# Patient Record
Sex: MALE | Race: WHITE | HISPANIC OR LATINO | Employment: UNEMPLOYED | URBAN - METROPOLITAN AREA
[De-identification: names, ages, dates, MRNs, and addresses within clinical notes are randomized per-mention and may not be internally consistent; named-entity substitution may affect disease eponyms.]

---

## 2019-12-10 ENCOUNTER — TELEPHONE (OUTPATIENT)
Dept: ENDOCRINOLOGY | Facility: CLINIC | Age: 39
End: 2019-12-10

## 2019-12-17 NOTE — TELEPHONE ENCOUNTER
Left vm message stating we got his referral from Dr Rodrigues and was calling to schedule a visit. Unfortunately Dr Gay does not have any openings anytime soon but will be happy to schedule with one of the other providers as soon as possible. Provided call back number.

## 2020-08-12 NOTE — TELEPHONE ENCOUNTER
Left vm stating we got his referral from Dr. Rodrigues on 12/2019 and was calling to schedule a visit.  Provided call back number again.  Scanned psychologist (Jatinder Rodrigues) notes and 2018 / 2019 labs in .

## 2021-05-04 ENCOUNTER — OFFICE VISIT (OUTPATIENT)
Dept: OTOLARYNGOLOGY | Facility: CLINIC | Age: 41
End: 2021-05-04
Payer: MEDICARE

## 2021-05-04 VITALS — SYSTOLIC BLOOD PRESSURE: 143 MMHG | HEART RATE: 78 BPM | DIASTOLIC BLOOD PRESSURE: 95 MMHG

## 2021-05-04 DIAGNOSIS — J03.91 RECURRENT TONSILLITIS: ICD-10-CM

## 2021-05-04 DIAGNOSIS — J35.1 ENLARGED TONSILS: Primary | ICD-10-CM

## 2021-05-04 DIAGNOSIS — G47.30 SLEEP-DISORDERED BREATHING: ICD-10-CM

## 2021-05-04 PROCEDURE — 1126F PR PAIN SEVERITY QUANTIFIED, NO PAIN PRESENT: ICD-10-PCS | Mod: S$GLB,,, | Performed by: NURSE PRACTITIONER

## 2021-05-04 PROCEDURE — 99999 PR PBB SHADOW E&M-EST. PATIENT-LVL III: ICD-10-PCS | Mod: PBBFAC,,, | Performed by: NURSE PRACTITIONER

## 2021-05-04 PROCEDURE — 1126F AMNT PAIN NOTED NONE PRSNT: CPT | Mod: S$GLB,,, | Performed by: NURSE PRACTITIONER

## 2021-05-04 PROCEDURE — 99999 PR PBB SHADOW E&M-EST. PATIENT-LVL III: CPT | Mod: PBBFAC,,, | Performed by: NURSE PRACTITIONER

## 2021-05-04 PROCEDURE — 99204 OFFICE O/P NEW MOD 45 MIN: CPT | Mod: S$GLB,,, | Performed by: NURSE PRACTITIONER

## 2021-05-04 PROCEDURE — 99204 PR OFFICE/OUTPT VISIT, NEW, LEVL IV, 45-59 MIN: ICD-10-PCS | Mod: S$GLB,,, | Performed by: NURSE PRACTITIONER

## 2021-05-04 RX ORDER — QUETIAPINE FUMARATE 300 MG/1
300 TABLET, FILM COATED ORAL NIGHTLY
COMMUNITY
Start: 2021-03-17

## 2021-05-04 RX ORDER — LAMOTRIGINE 200 MG/1
200 TABLET ORAL DAILY
COMMUNITY
Start: 2021-03-17

## 2021-05-04 RX ORDER — FENOFIBRATE 160 MG/1
TABLET ORAL
COMMUNITY
Start: 2021-02-14

## 2021-05-04 RX ORDER — RISPERIDONE 3 MG/1
3 TABLET ORAL NIGHTLY
COMMUNITY
Start: 2021-03-17

## 2021-05-04 RX ORDER — VORTIOXETINE 20 MG/1
TABLET, FILM COATED ORAL
COMMUNITY
Start: 2021-04-13

## 2021-05-04 RX ORDER — LEVOTHYROXINE SODIUM 75 UG/1
TABLET ORAL
COMMUNITY
Start: 2021-02-14

## 2021-05-04 RX ORDER — CLONAZEPAM 2 MG/1
TABLET ORAL
COMMUNITY
Start: 2021-04-21

## 2021-05-04 RX ORDER — METOPROLOL SUCCINATE 50 MG/1
TABLET, EXTENDED RELEASE ORAL
COMMUNITY
Start: 2021-04-22

## 2021-05-17 ENCOUNTER — OFFICE VISIT (OUTPATIENT)
Dept: SLEEP MEDICINE | Facility: CLINIC | Age: 41
End: 2021-05-17
Payer: MEDICARE

## 2021-05-17 VITALS
DIASTOLIC BLOOD PRESSURE: 94 MMHG | HEIGHT: 71 IN | BODY MASS INDEX: 32.06 KG/M2 | HEART RATE: 73 BPM | WEIGHT: 229 LBS | SYSTOLIC BLOOD PRESSURE: 134 MMHG

## 2021-05-17 DIAGNOSIS — F51.09 OTHER INSOMNIA NOT DUE TO A SUBSTANCE OR KNOWN PHYSIOLOGICAL CONDITION: ICD-10-CM

## 2021-05-17 DIAGNOSIS — G47.10 HYPERSOMNOLENCE: Primary | ICD-10-CM

## 2021-05-17 DIAGNOSIS — J35.1 TONSILLAR HYPERTROPHY: ICD-10-CM

## 2021-05-17 DIAGNOSIS — G47.30 SLEEP-DISORDERED BREATHING: ICD-10-CM

## 2021-05-17 PROCEDURE — 99999 PR PBB SHADOW E&M-EST. PATIENT-LVL IV: CPT | Mod: PBBFAC,,, | Performed by: INTERNAL MEDICINE

## 2021-05-17 PROCEDURE — 99999 PR PBB SHADOW E&M-EST. PATIENT-LVL IV: ICD-10-PCS | Mod: PBBFAC,,, | Performed by: INTERNAL MEDICINE

## 2021-05-17 PROCEDURE — 3008F BODY MASS INDEX DOCD: CPT | Mod: CPTII,S$GLB,, | Performed by: INTERNAL MEDICINE

## 2021-05-17 PROCEDURE — 3008F PR BODY MASS INDEX (BMI) DOCUMENTED: ICD-10-PCS | Mod: CPTII,S$GLB,, | Performed by: INTERNAL MEDICINE

## 2021-05-17 PROCEDURE — 99204 OFFICE O/P NEW MOD 45 MIN: CPT | Mod: S$GLB,,, | Performed by: INTERNAL MEDICINE

## 2021-05-17 PROCEDURE — 1126F AMNT PAIN NOTED NONE PRSNT: CPT | Mod: S$GLB,,, | Performed by: INTERNAL MEDICINE

## 2021-05-17 PROCEDURE — 99204 PR OFFICE/OUTPT VISIT, NEW, LEVL IV, 45-59 MIN: ICD-10-PCS | Mod: S$GLB,,, | Performed by: INTERNAL MEDICINE

## 2021-05-17 PROCEDURE — 1126F PR PAIN SEVERITY QUANTIFIED, NO PAIN PRESENT: ICD-10-PCS | Mod: S$GLB,,, | Performed by: INTERNAL MEDICINE

## 2021-05-18 ENCOUNTER — TELEPHONE (OUTPATIENT)
Dept: SLEEP MEDICINE | Facility: OTHER | Age: 41
End: 2021-05-18

## 2021-05-20 ENCOUNTER — TELEPHONE (OUTPATIENT)
Dept: SLEEP MEDICINE | Facility: OTHER | Age: 41
End: 2021-05-20

## 2021-05-21 ENCOUNTER — HOSPITAL ENCOUNTER (OUTPATIENT)
Dept: SLEEP MEDICINE | Facility: OTHER | Age: 41
Discharge: HOME | End: 2021-05-21
Attending: INTERNAL MEDICINE
Payer: MEDICARE

## 2021-05-21 DIAGNOSIS — G47.10 HYPERSOMNOLENCE: ICD-10-CM

## 2021-05-21 DIAGNOSIS — J35.1 TONSILLAR HYPERTROPHY: ICD-10-CM

## 2021-05-21 DIAGNOSIS — F51.09 OTHER INSOMNIA NOT DUE TO A SUBSTANCE OR KNOWN PHYSIOLOGICAL CONDITION: ICD-10-CM

## 2021-05-21 PROCEDURE — 95800 SLP STDY UNATTENDED: CPT

## 2025-04-24 ENCOUNTER — HOSPITAL ENCOUNTER (EMERGENCY)
Facility: HOSPITAL | Age: 45
Discharge: HOME | End: 2025-04-24
Attending: EMERGENCY MEDICINE
Payer: MEDICARE

## 2025-04-24 VITALS
RESPIRATION RATE: 18 BRPM | DIASTOLIC BLOOD PRESSURE: 85 MMHG | HEART RATE: 71 BPM | HEIGHT: 71 IN | WEIGHT: 206 LBS | TEMPERATURE: 98 F | OXYGEN SATURATION: 98 % | SYSTOLIC BLOOD PRESSURE: 140 MMHG | BODY MASS INDEX: 28.84 KG/M2

## 2025-04-24 DIAGNOSIS — R41.82 ALTERED MENTAL STATUS, UNSPECIFIED ALTERED MENTAL STATUS TYPE: Primary | ICD-10-CM

## 2025-04-24 DIAGNOSIS — F99 PSYCHIATRIC DISORDER: ICD-10-CM

## 2025-04-24 DIAGNOSIS — R41.0 CONFUSION: ICD-10-CM

## 2025-04-24 LAB
ABSOLUTE EOSINOPHIL (OHS): 0.14 K/UL
ABSOLUTE MONOCYTE (OHS): 0.29 K/UL (ref 0.3–1)
ABSOLUTE NEUTROPHIL COUNT (OHS): 2 K/UL (ref 1.8–7.7)
ALBUMIN SERPL BCP-MCNC: 4.3 G/DL (ref 3.5–5.2)
ALP SERPL-CCNC: 41 UNIT/L (ref 40–150)
ALT SERPL W/O P-5'-P-CCNC: 18 UNIT/L (ref 10–44)
AMPHET UR QL SCN: NEGATIVE
ANION GAP (OHS): 7 MMOL/L (ref 8–16)
AST SERPL-CCNC: 16 UNIT/L (ref 11–45)
BARBITURATE SCN PRESENT UR: NEGATIVE
BASOPHILS # BLD AUTO: 0.02 K/UL
BASOPHILS NFR BLD AUTO: 0.5 %
BENZODIAZ UR QL SCN: ABNORMAL
BILIRUB SERPL-MCNC: 0.6 MG/DL (ref 0.1–1)
BILIRUB UR QL STRIP.AUTO: NEGATIVE
BUN SERPL-MCNC: 18 MG/DL (ref 6–20)
CALCIUM SERPL-MCNC: 9.2 MG/DL (ref 8.7–10.5)
CANNABINOIDS UR QL SCN: ABNORMAL
CHLORIDE SERPL-SCNC: 107 MMOL/L (ref 95–110)
CLARITY UR: CLEAR
CO2 SERPL-SCNC: 25 MMOL/L (ref 23–29)
COCAINE UR QL SCN: NEGATIVE
COLOR UR AUTO: YELLOW
CREAT SERPL-MCNC: 1.2 MG/DL (ref 0.5–1.4)
CREAT UR-MCNC: 341 MG/DL (ref 23–375)
ERYTHROCYTE [DISTWIDTH] IN BLOOD BY AUTOMATED COUNT: 11.9 % (ref 11.5–14.5)
ETHANOL SERPL-MCNC: <10 MG/DL
GFR SERPLBLD CREATININE-BSD FMLA CKD-EPI: >60 ML/MIN/1.73/M2
GLUCOSE SERPL-MCNC: 93 MG/DL (ref 70–110)
GLUCOSE UR QL STRIP: NEGATIVE
HCT VFR BLD AUTO: 39.1 % (ref 40–54)
HGB BLD-MCNC: 12.8 GM/DL (ref 14–18)
HGB UR QL STRIP: NEGATIVE
HOLD SPECIMEN: NORMAL
HOLD SPECIMEN: NORMAL
IMM GRANULOCYTES # BLD AUTO: 0.02 K/UL (ref 0–0.04)
IMM GRANULOCYTES NFR BLD AUTO: 0.5 % (ref 0–0.5)
KETONES UR QL STRIP: NEGATIVE
LEUKOCYTE ESTERASE UR QL STRIP: NEGATIVE
LYMPHOCYTES # BLD AUTO: 1.32 K/UL (ref 1–4.8)
MCH RBC QN AUTO: 29.4 PG (ref 27–31)
MCHC RBC AUTO-ENTMCNC: 32.7 G/DL (ref 32–36)
MCV RBC AUTO: 90 FL (ref 82–98)
METHADONE UR QL SCN: NEGATIVE
NITRITE UR QL STRIP: NEGATIVE
NUCLEATED RBC (/100WBC) (OHS): 0 /100 WBC
OHS QRS DURATION: 106 MS
OHS QTC CALCULATION: 401 MS
OPIATES UR QL SCN: NEGATIVE
PCP UR QL: NEGATIVE
PH UR STRIP: 7 [PH]
PLATELET # BLD AUTO: 239 K/UL (ref 150–450)
PMV BLD AUTO: 10.2 FL (ref 9.2–12.9)
POTASSIUM SERPL-SCNC: 3.8 MMOL/L (ref 3.5–5.1)
PROT SERPL-MCNC: 7.2 GM/DL (ref 6–8.4)
PROT UR QL STRIP: ABNORMAL
RBC # BLD AUTO: 4.35 M/UL (ref 4.6–6.2)
RELATIVE EOSINOPHIL (OHS): 3.7 %
RELATIVE LYMPHOCYTE (OHS): 34.8 % (ref 18–48)
RELATIVE MONOCYTE (OHS): 7.7 % (ref 4–15)
RELATIVE NEUTROPHIL (OHS): 52.8 % (ref 38–73)
SODIUM SERPL-SCNC: 139 MMOL/L (ref 136–145)
SP GR UR STRIP: 1.02
TSH SERPL-ACNC: 1.27 UIU/ML (ref 0.4–4)
UROBILINOGEN UR STRIP-ACNC: NEGATIVE EU/DL
WBC # BLD AUTO: 3.79 K/UL (ref 3.9–12.7)

## 2025-04-24 PROCEDURE — 80175 DRUG SCREEN QUAN LAMOTRIGINE: CPT | Performed by: NURSE PRACTITIONER

## 2025-04-24 PROCEDURE — 82565 ASSAY OF CREATININE: CPT | Performed by: NURSE PRACTITIONER

## 2025-04-24 PROCEDURE — 82077 ASSAY SPEC XCP UR&BREATH IA: CPT | Performed by: EMERGENCY MEDICINE

## 2025-04-24 PROCEDURE — 85025 COMPLETE CBC W/AUTO DIFF WBC: CPT | Performed by: NURSE PRACTITIONER

## 2025-04-24 PROCEDURE — 80307 DRUG TEST PRSMV CHEM ANLYZR: CPT | Performed by: NURSE PRACTITIONER

## 2025-04-24 PROCEDURE — 99285 EMERGENCY DEPT VISIT HI MDM: CPT | Mod: 25

## 2025-04-24 PROCEDURE — 84443 ASSAY THYROID STIM HORMONE: CPT | Performed by: NURSE PRACTITIONER

## 2025-04-24 PROCEDURE — 81003 URINALYSIS AUTO W/O SCOPE: CPT | Performed by: NURSE PRACTITIONER

## 2025-04-24 NOTE — FIRST PROVIDER EVALUATION
" Emergency Department TeleTriage Encounter Note      CHIEF COMPLAINT    Chief Complaint   Patient presents with    Altered Mental Status     Pt reports increased confusion x2 week. Mother reports pt unable to complete thoughts and is not acting himself. +dizziness. Pt answering all questions appropriately in triage.       VITAL SIGNS   Initial Vitals [04/24/25 1036]   BP Pulse Resp Temp SpO2   130/70 77 20 97.6 °F (36.4 °C) 97 %      MAP       --            ALLERGIES    Review of patient's allergies indicates:  No Known Allergies    PROVIDER TRIAGE NOTE  Verbal consent for the teletriage evaluation was given by the patient at the start of the evaluation.  All efforts will be made to maintain patient's privacy during the evaluation.      This is a teletriage evaluation of a 44 y.o. male presenting to the ED with c/o dizziness, "confused" that started 2 weeks ago. Patient has some confusion baseline, but per mother worsened over 2 weeks ago.  Likely mixed up psych meds and has not been taking thyroid meds.  Limited physical exam via telehealth: The patient is awake, alert, answering questions appropriately and is not in respiratory distress.  As the Teletriage provider, I performed an initial assessment and ordered appropriate labs and imaging studies, if any, to facilitate the patient's care once placed in the ED. Once a room is available, care and a full evaluation will be completed by an alternate ED provider.  Any additional orders and the final disposition will be determined by that provider.  All imaging and labs will not be followed-up by the Teletriage Team, including myself.      ORDERS  Labs Reviewed   CBC W/ AUTO DIFFERENTIAL    Narrative:     The following orders were created for panel order CBC auto differential.  Procedure                               Abnormality         Status                     ---------                               -----------         ------                     CBC with " Differential[[id number]]                                                          Please view results for these tests on the individual orders.   COMPREHENSIVE METABOLIC PANEL   URINALYSIS, REFLEX TO URINE CULTURE   DRUG SCREEN PANEL, URINE EMERGENCY   CBC WITH DIFFERENTIAL   POCT GLUCOSE MONITORING CONTINUOUS       ED Orders (720h ago, onward)      Start Ordered     Status Ordering Provider    04/24/25 1048 04/24/25 1048  TSH  STAT         Ordered RAFIA SOTO    04/24/25 1048 04/24/25 1048  Lamotrigine Level  Once         Ordered CHARLESRAFIA LION    04/24/25 1046 04/24/25 1045  POCT glucose  Once         Ordered CHARLESRAFIA LION    04/24/25 1046 04/24/25 1045  Urinalysis, Reflex to Urine Culture Urine, Clean Catch  STAT         Ordered CHARLESRAFIA LION    04/24/25 1046 04/24/25 1045  Drug screen panel, emergency  STAT         Ordered RAFIA SOTO    04/24/25 1045 04/24/25 1045  Saline lock IV  Once         Ordered RAFIA SOTO    04/24/25 1045 04/24/25 1045  Pulse Oximetry Continuous  Continuous         Ordered RAFIA SOTO    04/24/25 1045 04/24/25 1045  Cardiac Monitoring - Adult  Continuous        Comments: Notify Physician If:    Ordered CHARLESRAFIA LION    04/24/25 1045 04/24/25 1045  EKG 12-lead  Once         Ordered RAFIA SOTO    04/24/25 1045 04/24/25 1045  CBC auto differential  STAT         Ordered RAFIA SOTO    04/24/25 1045 04/24/25 1045  Comprehensive metabolic panel  STAT         Ordered RAFIA SOTO    04/24/25 1045 04/24/25 1045  CBC with Differential  PROCEDURE ONCE         Ordered RAFIA SOTO              Virtual Visit Note: The provider triage portion of this emergency department evaluation and documentation was performed via [employer], a HIPAA-compliant telemedicine application, in concert with a tele-presenter in the room. A face to face patient evaluation with one of my colleagues will occur once the patient is placed in an emergency department room.      DISCLAIMER: This note was prepared  with M*Modal voice recognition transcription software. Garbled syntax, mangled pronouns, and other bizarre constructions may be attributed to that software system.

## 2025-04-24 NOTE — ED PROVIDER NOTES
Encounter Date: 4/24/2025       History     Chief Complaint   Patient presents with    Altered Mental Status     Pt reports increased confusion x2 week. Mother reports pt unable to complete thoughts and is not acting himself. +dizziness. Pt answering all questions appropriately in triage.     Patient is a 44-year-old male presenting to the ED with his mother who says the patient has had periods of confusion over the past couple of weeks.  He seems at times unable to complete his thoughts.  He denies EtOH.  He admits to occasionally smoking marijuana.  He is currently on multiple psychiatric medications.      Review of patient's allergies indicates:  No Known Allergies  History reviewed. No pertinent past medical history.  History reviewed. No pertinent surgical history.  No family history on file.  Social History[1]  Review of Systems   Constitutional:  Negative for fever.   Neurological:  Negative for syncope and headaches.   All other systems reviewed and are negative.      Physical Exam     Initial Vitals [04/24/25 1036]   BP Pulse Resp Temp SpO2   130/70 77 20 97.6 °F (36.4 °C) 97 %      MAP       --         Physical Exam    Nursing note and vitals reviewed.  Constitutional: No distress.   HENT:   Head: Atraumatic.   Neck: Neck supple.   Cardiovascular:  Normal rate and regular rhythm.           Pulmonary/Chest: Breath sounds normal.   Musculoskeletal:      Cervical back: Neck supple.     Neurological: He is alert and oriented to person, place, and time.   Skin: Skin is warm and dry.   Psychiatric: He has a normal mood and affect. His behavior is normal. Judgment and thought content normal.         ED Course   Procedures  Labs Reviewed   COMPREHENSIVE METABOLIC PANEL - Abnormal       Result Value    Sodium 139      Potassium 3.8      Chloride 107      CO2 25      Glucose 93      BUN 18      Creatinine 1.2      Calcium 9.2      Protein Total 7.2      Albumin 4.3      Bilirubin Total 0.6      ALP 41      AST 16    "   ALT 18      Anion Gap 7 (*)     eGFR >60     URINALYSIS, REFLEX TO URINE CULTURE - Abnormal    Color, UA Yellow      Appearance, UA Clear      pH, UA 7.0      Spec Grav UA 1.025      Protein, UA Trace (*)     Glucose, UA Negative      Ketones, UA Negative      Bilirubin, UA Negative      Blood, UA Negative      Nitrites, UA Negative      Urobilinogen, UA Negative      Leukocyte Esterase, UA Negative     DRUG SCREEN PANEL, URINE EMERGENCY - Abnormal    Benzodiazepine, Urine Presumptive Positive (*)     Methadone, Urine Negative      Cocaine, Urine Negative      Opiates, Urine Negative      Barbiturates, Urine Negative      Amphetamines, Urine Negative      THC Presumptive Positive (*)     Phencyclidine, Urine Negative      Urine Creatinine 341.0      Narrative:     This screen includes the following classes of drugs at the listed cut-off:     Benzodiazepines:        200 ng/ml   Methadone:              300 ng/ml   Cocaine metabolite:     300 ng/ml   Opiates:                300 ng/ml   Barbiturates:           200 ng/ml   Amphetamines:           1000 ng/ml   Marijuana metabs (THC): 50 ng/ml   Phencyclidine (PCP):    25 ng/ml     This is a screening test. If results do not correlate with clinical presentation, then a confirmatory send out test is advised.    This report is intended for use in clinical monitoring and management of patients. It is not intended for use in employment related drug testing."   CBC WITH DIFFERENTIAL - Abnormal    WBC 3.79 (*)     RBC 4.35 (*)     HGB 12.8 (*)     HCT 39.1 (*)     MCV 90      MCH 29.4      MCHC 32.7      RDW 11.9      Platelet Count 239      MPV 10.2      Nucleated RBC 0      Neut % 52.8      Lymph % 34.8      Mono % 7.7      Eos % 3.7      Basophil % 0.5      Imm Grans % 0.5      Neut # 2.00      Lymph # 1.32      Mono # 0.29 (*)     Eos # 0.14      Baso # 0.02      Imm Grans # 0.02     TSH - Normal    TSH 1.268     ALCOHOL,MEDICAL (ETHANOL) - Normal    Alcohol, Serum <10   "   CBC W/ AUTO DIFFERENTIAL    Narrative:     The following orders were created for panel order CBC auto differential.  Procedure                               Abnormality         Status                     ---------                               -----------         ------                     CBC with Differential[[id number]]         Abnormal            Final result                 Please view results for these tests on the individual orders.   EXTRA TUBES    Narrative:     The following orders were created for panel order EXTRA TUBES.  Procedure                               Abnormality         Status                     ---------                               -----------         ------                     Gold Top Hold[[id number]]                                   Final result                 Please view results for these tests on the individual orders.   GOLD TOP HOLD    Extra Tube Hold for add-ons.     GREY TOP URINE HOLD    Extra Tube Hold for add-ons.     LAMOTRIGINE LEVEL        ECG Results              EKG 12-lead (In process)        Collection Time Result Time QRS Duration [facility] QTC Calculation    03/10/18 07:13:58 04/24/25 13:26:19 106 401                     In process by Interface, Lab In [facility] (04/24/25 13:26:28)                   Narrative:    Test Reason : R42,    Vent. Rate :  74 BPM     Atrial Rate :  74 BPM     P-R Int : 192 ms          QRS Dur : 106 ms      QT Int : 362 ms       P-R-T Axes :  14  97  32 degrees    QTcB Int : 401 ms    Normal sinus rhythm  Rightward axis  Borderline Abnormal ECG  No previous ECGs available    Referred By: AAAREFERRAL SELF           Confirmed By:                                   Imaging Results              CT Head Without Contrast (Final result)  Result time 04/24/25 11:48:22      Final result by Ever Ashraf MD (04/24/25 11:48:22)                   Impression:      No acute intracranial abnormality.      Electronically signed by: Ever Ashraf  MD  Date:    04/24/2025  Time:    11:48               Narrative:    EXAMINATION:  CT HEAD WITHOUT CONTRAST    CLINICAL HISTORY:  Mental status change, unknown cause;    TECHNIQUE:  Low dose axial images were obtained through the head.  Coronal and sagittal reformations were also performed. Contrast was not administered.    FINDINGS:  The brain has a normal appearance.  The ventricular system is within normal limits of size for age and shows no distortion by mass effect.  There is no intra or extra-axial mass or hemorrhage identified.  The visualized extracranial structures are significant for bilateral ethmoid mucous membrane thickening.                                       Medications - No data to display  Medical Decision Making  Emergent evaluation of a 44-year-old male who was on multiple psychiatric medications in his having brief periods of confusion.  Workup done here in the ED is unremarkable.  I have explained to the parents that the periods of confusion may be due to the medications he takes.  I suggested close follow up with the physician that prescribes these.  They may return to the ED as needed.    Amount and/or Complexity of Data Reviewed  Labs: ordered.     Details: CBC is unremarkable.  CMP is unremarkable.  Tox screen is positive for benzodiazepines and THC.  Radiology: ordered.                                      Clinical Impression:  Final diagnoses:  [R41.82] Altered mental status, unspecified altered mental status type (Primary)  [R41.0] Confusion  [F99] Psychiatric disorder          ED Disposition Condition    Discharge Good          ED Prescriptions    None       Follow-up Information       Follow up With Specialties Details Why Contact Info    Dk Diaz MD Internal Medicine Schedule an appointment as soon as possible for a visit   [address] [facility] [address]  [phone]      [facility] Emergency Dept Emergency Medicine  If symptoms worsen [address] [place] [address]  Alejandra Alberts [place] [id number] [phone]                 [1]   Social History  Tobacco Use    Smoking status: Some Days     Types: Cigarettes    Smokeless tobacco: Current    Tobacco comments:     Patient states I vape occasionally    Substance Use Topics    Alcohol use: Not Currently    Drug use: Yes     Types: Marijuana     Comment: Patient states I smoke every night and it is prescribed for anxiety        Juanjo Mrash MD  04/24/25 2999

## 2025-04-28 LAB — W LAMOTRIGINE: 9.5 UG/ML

## 2025-04-30 ENCOUNTER — OFFICE VISIT (OUTPATIENT)
Dept: INTERNAL MEDICINE | Facility: CLINIC | Age: 45
End: 2025-04-30
Payer: MEDICARE

## 2025-04-30 ENCOUNTER — LAB VISIT (OUTPATIENT)
Dept: LAB | Facility: HOSPITAL | Age: 45
End: 2025-04-30
Payer: MEDICARE

## 2025-04-30 VITALS
SYSTOLIC BLOOD PRESSURE: 124 MMHG | WEIGHT: 211.63 LBS | BODY MASS INDEX: 29.63 KG/M2 | HEART RATE: 105 BPM | OXYGEN SATURATION: 98 % | DIASTOLIC BLOOD PRESSURE: 82 MMHG | HEIGHT: 71 IN

## 2025-04-30 DIAGNOSIS — Z13.1 SCREENING FOR DIABETES MELLITUS: ICD-10-CM

## 2025-04-30 DIAGNOSIS — R79.9 ABNORMAL FINDING OF BLOOD CHEMISTRY, UNSPECIFIED: ICD-10-CM

## 2025-04-30 DIAGNOSIS — Z11.4 SCREENING FOR HIV (HUMAN IMMUNODEFICIENCY VIRUS): ICD-10-CM

## 2025-04-30 DIAGNOSIS — E03.9 HYPOTHYROIDISM, UNSPECIFIED TYPE: ICD-10-CM

## 2025-04-30 DIAGNOSIS — Z11.59 ENCOUNTER FOR HEPATITIS C SCREENING TEST FOR LOW RISK PATIENT: ICD-10-CM

## 2025-04-30 DIAGNOSIS — Z76.89 ENCOUNTER TO ESTABLISH CARE: Primary | ICD-10-CM

## 2025-04-30 DIAGNOSIS — E78.5 HYPERLIPIDEMIA, UNSPECIFIED HYPERLIPIDEMIA TYPE: ICD-10-CM

## 2025-04-30 DIAGNOSIS — I10 HYPERTENSION, UNSPECIFIED TYPE: ICD-10-CM

## 2025-04-30 DIAGNOSIS — F25.9 SCHIZOAFFECTIVE DISORDER, UNSPECIFIED TYPE: ICD-10-CM

## 2025-04-30 LAB
CHOLEST SERPL-MCNC: 186 MG/DL (ref 120–199)
CHOLEST/HDLC SERPL: 5.5 {RATIO} (ref 2–5)
EAG (OHS): 97 MG/DL (ref 68–131)
HBA1C MFR BLD: 5 % (ref 4–5.6)
HDLC SERPL-MCNC: 34 MG/DL (ref 40–75)
HDLC SERPL: 18.3 % (ref 20–50)
LDLC SERPL CALC-MCNC: 86.2 MG/DL (ref 63–159)
NONHDLC SERPL-MCNC: 152 MG/DL
TRIGL SERPL-MCNC: 329 MG/DL (ref 30–150)

## 2025-04-30 PROCEDURE — 99203 OFFICE O/P NEW LOW 30 MIN: CPT | Mod: GE,S$GLB,,

## 2025-04-30 PROCEDURE — 3079F DIAST BP 80-89 MM HG: CPT | Mod: CPTII,GC,S$GLB,

## 2025-04-30 PROCEDURE — 4010F ACE/ARB THERAPY RXD/TAKEN: CPT | Mod: CPTII,GC,S$GLB,

## 2025-04-30 PROCEDURE — 86803 HEPATITIS C AB TEST: CPT

## 2025-04-30 PROCEDURE — 99999 PR PBB SHADOW E&M-EST. PATIENT-LVL III: CPT | Mod: PBBFAC,GC,,

## 2025-04-30 PROCEDURE — 36415 COLL VENOUS BLD VENIPUNCTURE: CPT

## 2025-04-30 PROCEDURE — 80061 LIPID PANEL: CPT

## 2025-04-30 PROCEDURE — 1159F MED LIST DOCD IN RCRD: CPT | Mod: CPTII,GC,S$GLB,

## 2025-04-30 PROCEDURE — 87389 HIV-1 AG W/HIV-1&-2 AB AG IA: CPT

## 2025-04-30 PROCEDURE — 83036 HEMOGLOBIN GLYCOSYLATED A1C: CPT

## 2025-04-30 PROCEDURE — 3008F BODY MASS INDEX DOCD: CPT | Mod: CPTII,GC,S$GLB,

## 2025-04-30 PROCEDURE — 3074F SYST BP LT 130 MM HG: CPT | Mod: CPTII,GC,S$GLB,

## 2025-04-30 NOTE — PROGRESS NOTES
[facility]  CLINIC NOTE    Patient Name: Ravi Ibanez  YOB: 1980    PRESENTING HISTORY       History of Present Illness:  Mr. Ravi Ibanez is a 44 y.o. male with a medical history of HTN, HLD, Hypothyroidism and schizoaffective disorder presenting to the clinic to establish care. Pt recently seen in the ED for AMS, workup unremarkable and it was likely due to taking his medication the wrong way. Today he has no symptoms or complains. For schizoaffective disorder he sees a virtual psychiatrist monthly. Pt smokes medical [employer], drinks socially once a month, works at [employer], does not exercise and has a typical [place] diet. Denies headaches, visual changes, SOB, cough, chest pain, palpitation, nausea, vomiting, abdominal pain, diarrhea, constipation, urinary frequency or any weakness.      Review of Systems   Constitutional:  Negative for chills, fever, malaise/fatigue and weight loss.   HENT:  Negative for congestion, sinus pain and sore throat.    Eyes:  Negative for blurred vision and double vision.   Respiratory:  Negative for cough, shortness of breath and wheezing.    Cardiovascular:  Negative for chest pain, palpitations and leg swelling.   Gastrointestinal:  Negative for abdominal pain, constipation, diarrhea, nausea and vomiting.   Genitourinary:  Negative for dysuria and frequency.   Musculoskeletal:  Negative for back pain, joint pain and myalgias.   Skin:  Negative for itching and rash.   Neurological:  Negative for dizziness, weakness and headaches.         PAST HISTORY:   No past medical history on file.    No past surgical history on file.    No family history on file.    Social History     Socioeconomic History    Marital status: Single   Tobacco Use    Smoking status: Former     Types: Cigarettes, Vaping w/o nicotine, Vaping with nicotine    Smokeless tobacco: Current    Tobacco comments:     Patient states I vape occasionally    Substance  "and Sexual Activity    Alcohol use: Not Currently    Drug use: Yes     Types: Marijuana     Comment: Patient states I smoke every night and it is prescribed for anxiety       MEDICATIONS & ALLERGIES:     Current Outpatient Medications on File Prior to Visit   Medication Sig    clonazePAM (KLONOPIN) 2 MG Tab     fenofibrate 160 MG Tab     LAMICTAL 200 mg tablet Take 200 mg by mouth once daily.    levothyroxine (SYNTHROID) 75 MCG tablet     LISINOPRIL ORAL Take by mouth.    metoprolol succinate (TOPROL-XL) 50 MG 24 hr tablet     RISPERDAL 3 mg Tab Take 3 mg by mouth nightly.    SEROQUEL 300 mg Tab Take 300 mg by mouth nightly.    TRINTELLIX 20 mg Tab      No current facility-administered medications on file prior to visit.       Review of patient's allergies indicates:  No Known Allergies    OBJECTIVE:   Vital Signs:  Vitals:    04/30/25 1413   BP: 124/82   Pulse: 105   SpO2: 98%   Weight: 96 kg (211 lb 10.3 oz)   Height: 5' 11" (1.803 m)       No results found for this or any previous visit (from the past 24 hours).      Physical Exam  Constitutional:       General: He is not in acute distress.  HENT:      Head: Normocephalic and atraumatic.   Eyes:      Extraocular Movements: Extraocular movements intact.      Pupils: Pupils are equal, round, and reactive to light.   Cardiovascular:      Rate and Rhythm: Normal rate and regular rhythm.      Heart sounds: No murmur heard.  Pulmonary:      Effort: Pulmonary effort is normal. No respiratory distress.      Breath sounds: No wheezing or rales.   Abdominal:      General: Abdomen is flat. Bowel sounds are normal. There is no distension.      Palpations: Abdomen is soft.      Tenderness: There is no abdominal tenderness.   Musculoskeletal:         General: No swelling or tenderness. Normal range of motion.      Cervical back: Normal range of motion.      Right lower leg: No edema.      Left lower leg: No edema.   Lymphadenopathy:      Cervical: No cervical adenopathy. "   Skin:     General: Skin is warm and dry.      Coloration: Skin is not jaundiced.      Findings: No bruising.   Neurological:      General: No focal deficit present.      Mental Status: He is alert and oriented to person, place, and time.           ASSESSMENT & PLAN:     1. Encounter to establish care    2. Hypertension, unspecified type  Overview:  Controlled   Currently on lisinopril      3. Hyperlipidemia, unspecified hyperlipidemia type  Overview:  Currently on fenofibrate  Ordering lipid panel today    Orders:  -     LIPID PANEL; Future; Expected date: 04/30/2025    4. Screening for HIV (human immunodeficiency virus)  -     HIV 1/2 Ag/Ab (4th Gen); Future; Expected date: 04/30/2025    5. Encounter for hepatitis C screening test for low risk patient  -     HEPATITIS C ANTIBODY; Future; Expected date: 04/30/2025    6. Screening for diabetes mellitus  -     HEMOGLOBIN A1C; Future; Expected date: 04/30/2025    7. Hypothyroidism, unspecified type  Overview:  Stable  Last TSH was 1.268  Currently on levothyroxine      8. Schizoaffective disorder, unspecified type  Overview:  Managed by psych  Stable  Currently on:  Klonopin  Lamictal  Risperdal   Seroquel   Trintellix       9. Abnormal finding of blood chemistry, unspecified  -     HEMOGLOBIN A1C; Future; Expected date: 04/30/2025        Discussed with Dr. Hernandez    RTC 6 months      Alistair Ha MD/MPH  Internal Medicine PGY-2  [facility]  [address] [place] [address]

## 2025-05-01 LAB
HCV AB SERPL QL IA: NORMAL
HIV 1+2 AB+HIV1 P24 AG SERPL QL IA: NORMAL

## 2025-07-09 ENCOUNTER — HOSPITAL ENCOUNTER (INPATIENT)
Facility: HOSPITAL | Age: 45
LOS: 6 days | Discharge: PSYCHIATRIC OR SUBSTANCE USE FACILITY | DRG: 917 | End: 2025-07-15
Attending: STUDENT IN AN ORGANIZED HEALTH CARE EDUCATION/TRAINING PROGRAM | Admitting: INTERNAL MEDICINE
Payer: MEDICARE

## 2025-07-09 DIAGNOSIS — Z91.89 AT RISK FOR LONG QT SYNDROME: ICD-10-CM

## 2025-07-09 DIAGNOSIS — Z97.8 ENDOTRACHEALLY INTUBATED: ICD-10-CM

## 2025-07-09 DIAGNOSIS — R94.31 QT PROLONGATION: ICD-10-CM

## 2025-07-09 DIAGNOSIS — R41.82 AMS (ALTERED MENTAL STATUS): ICD-10-CM

## 2025-07-09 DIAGNOSIS — R06.89 RESPIRATORY DEPRESSION: ICD-10-CM

## 2025-07-09 DIAGNOSIS — Z51.81 THERAPEUTIC DRUG MONITORING: ICD-10-CM

## 2025-07-09 DIAGNOSIS — T43.504A: Primary | ICD-10-CM

## 2025-07-09 PROBLEM — E87.6 HYPOKALEMIA: Status: ACTIVE | Noted: 2025-07-09

## 2025-07-09 PROBLEM — G93.40 ACUTE ENCEPHALOPATHY: Status: ACTIVE | Noted: 2025-07-09

## 2025-07-09 PROBLEM — J96.00 ACUTE RESPIRATORY FAILURE: Status: ACTIVE | Noted: 2025-07-09

## 2025-07-09 PROBLEM — E87.20 LACTIC ACID ACIDOSIS: Status: ACTIVE | Noted: 2025-07-09

## 2025-07-09 LAB
ABSOLUTE EOSINOPHIL (OHS): 0.04 K/UL
ABSOLUTE MONOCYTE (OHS): 0.3 K/UL (ref 0.3–1)
ABSOLUTE NEUTROPHIL COUNT (OHS): 5.31 K/UL (ref 1.8–7.7)
ALBUMIN SERPL BCP-MCNC: 4 G/DL (ref 3.5–5.2)
ALP SERPL-CCNC: 40 UNIT/L (ref 40–150)
ALT SERPL W/O P-5'-P-CCNC: 23 UNIT/L (ref 10–44)
AMPHET UR QL SCN: NEGATIVE
AMPHET UR QL SCN: NEGATIVE
ANION GAP (OHS): 12 MMOL/L (ref 8–16)
APAP SERPL-MCNC: <3 UG/ML (ref 10–20)
AST SERPL-CCNC: 17 UNIT/L (ref 11–45)
BARBITURATE SCN PRESENT UR: NEGATIVE
BARBITURATE SCN PRESENT UR: NEGATIVE
BASOPHILS # BLD AUTO: 0.02 K/UL
BASOPHILS NFR BLD AUTO: 0.3 %
BENZODIAZ UR QL SCN: ABNORMAL
BENZODIAZ UR QL SCN: ABNORMAL
BILIRUB SERPL-MCNC: 0.6 MG/DL (ref 0.1–1)
BILIRUB UR QL STRIP.AUTO: NEGATIVE
BIPAP: 0
BUN SERPL-MCNC: 12 MG/DL (ref 6–20)
BUN SERPL-MCNC: 12 MG/DL (ref 6–30)
CALCIUM SERPL-MCNC: 8.3 MG/DL (ref 8.7–10.5)
CANNABINOIDS UR QL SCN: ABNORMAL
CANNABINOIDS UR QL SCN: ABNORMAL
CHLORIDE SERPL-SCNC: 106 MMOL/L (ref 95–110)
CHLORIDE SERPL-SCNC: 108 MMOL/L (ref 95–110)
CK SERPL-CCNC: 92 U/L (ref 20–200)
CK SERPL-CCNC: 93 U/L (ref 20–200)
CLARITY UR: CLEAR
CO2 SERPL-SCNC: 18 MMOL/L (ref 23–29)
COCAINE UR QL SCN: NEGATIVE
COCAINE UR QL SCN: NEGATIVE
COLOR UR AUTO: YELLOW
CORRECTED TEMPERATURE (PCO2): 39.8 MMHG
CORRECTED TEMPERATURE (PH): 7.32
CORRECTED TEMPERATURE (PO2): 51.5 MMHG
CREAT SERPL-MCNC: 0.8 MG/DL (ref 0.5–1.4)
CREAT SERPL-MCNC: 0.8 MG/DL (ref 0.5–1.4)
CREAT UR-MCNC: 221 MG/DL (ref 23–375)
CREAT UR-MCNC: 223 MG/DL (ref 23–375)
ERYTHROCYTE [DISTWIDTH] IN BLOOD BY AUTOMATED COUNT: 12.1 % (ref 11.5–14.5)
ETHANOL SERPL-MCNC: <10 MG/DL
FIO2: 21 %
GFR SERPLBLD CREATININE-BSD FMLA CKD-EPI: >60 ML/MIN/1.73/M2
GLUCOSE SERPL-MCNC: 156 MG/DL (ref 70–110)
GLUCOSE SERPL-MCNC: 162 MG/DL (ref 70–110)
GLUCOSE UR QL STRIP: ABNORMAL
HCT VFR BLD AUTO: 38.3 % (ref 40–54)
HCT VFR BLD CALC: 39 %PCV (ref 36–54)
HCT VFR BLD CALC: 41.1 % (ref 36–54)
HGB BLD-MCNC: 12.8 GM/DL (ref 14–18)
HGB UR QL STRIP: ABNORMAL
IMM GRANULOCYTES # BLD AUTO: 0.02 K/UL (ref 0–0.04)
IMM GRANULOCYTES NFR BLD AUTO: 0.3 % (ref 0–0.5)
KETONES UR QL STRIP: NEGATIVE
LDH SERPL L TO P-CCNC: 4.2 MMOL/L (ref 0.5–2.2)
LEUKOCYTE ESTERASE UR QL STRIP: NEGATIVE
LYMPHOCYTES # BLD AUTO: 0.93 K/UL (ref 1–4.8)
MAGNESIUM SERPL-MCNC: 1.7 MG/DL (ref 1.6–2.6)
MCH RBC QN AUTO: 29.6 PG (ref 27–31)
MCHC RBC AUTO-ENTMCNC: 33.4 G/DL (ref 32–36)
MCV RBC AUTO: 89 FL (ref 82–98)
METHADONE UR QL SCN: NEGATIVE
METHADONE UR QL SCN: NEGATIVE
MICROSCOPIC COMMENT: NORMAL
NITRITE UR QL STRIP: NEGATIVE
NUCLEATED RBC (/100WBC) (OHS): 0 /100 WBC
OPIATES UR QL SCN: NEGATIVE
OPIATES UR QL SCN: NEGATIVE
PCO2 BLDA: 39.8 MMHG (ref 35–45)
PCP UR QL: NEGATIVE
PCP UR QL: NEGATIVE
PH SMN: 7.32 [PH] (ref 7.35–7.45)
PH UR STRIP: 6 [PH]
PLATELET # BLD AUTO: 208 K/UL (ref 150–450)
PMV BLD AUTO: 10.3 FL (ref 9.2–12.9)
PO2 BLDA: 51.5 MMHG (ref 40–60)
POC BASE DEFICIT: -5 MMOL/L
POC HCO3: 20 MMOL/L (ref 24–28)
POC IONIZED CALCIUM: 1.08 MMOL/L (ref 1.06–1.42)
POC PERFORMED BY: ABNORMAL
POC TCO2 (MEASURED): 19 MMOL/L (ref 23–29)
POC TEMPERATURE: 37 C
POTASSIUM BLD-SCNC: 2.9 MMOL/L (ref 3.5–5.1)
POTASSIUM SERPL-SCNC: 2.9 MMOL/L (ref 3.5–5.1)
PROT SERPL-MCNC: 6.3 GM/DL (ref 6–8.4)
PROT UR QL STRIP: ABNORMAL
RBC # BLD AUTO: 4.33 M/UL (ref 4.6–6.2)
RBC #/AREA URNS AUTO: 3 /HPF (ref 0–4)
RELATIVE EOSINOPHIL (OHS): 0.6 %
RELATIVE LYMPHOCYTE (OHS): 14 % (ref 18–48)
RELATIVE MONOCYTE (OHS): 4.5 % (ref 4–15)
RELATIVE NEUTROPHIL (OHS): 80.3 % (ref 38–73)
SALICYLATES SERPL-MCNC: <5 MG/DL (ref 15–30)
SAMPLE: ABNORMAL
SODIUM BLD-SCNC: 138 MMOL/L (ref 136–145)
SODIUM SERPL-SCNC: 138 MMOL/L (ref 136–145)
SP GR UR STRIP: >=1.03
SPECIMEN SOURCE: ABNORMAL
SQUAMOUS #/AREA URNS AUTO: <1 /HPF
TRIGL SERPL-MCNC: 79 MG/DL (ref 30–150)
TSH SERPL-ACNC: 0.72 UIU/ML (ref 0.4–4)
UROBILINOGEN UR STRIP-ACNC: NEGATIVE EU/DL
WBC # BLD AUTO: 6.62 K/UL (ref 3.9–12.7)
WBC #/AREA URNS AUTO: 1 /HPF (ref 0–5)

## 2025-07-09 PROCEDURE — 80179 DRUG ASSAY SALICYLATE: CPT | Mod: HCNC | Performed by: STUDENT IN AN ORGANIZED HEALTH CARE EDUCATION/TRAINING PROGRAM

## 2025-07-09 PROCEDURE — 20000000 HC ICU ROOM: Mod: HCNC

## 2025-07-09 PROCEDURE — 82803 BLOOD GASES ANY COMBINATION: CPT | Mod: HCNC

## 2025-07-09 PROCEDURE — 83735 ASSAY OF MAGNESIUM: CPT | Mod: HCNC

## 2025-07-09 PROCEDURE — 27100171 HC OXYGEN HIGH FLOW UP TO 24 HOURS: Mod: HCNC

## 2025-07-09 PROCEDURE — 80307 DRUG TEST PRSMV CHEM ANLYZR: CPT | Mod: HCNC | Performed by: EMERGENCY MEDICINE

## 2025-07-09 PROCEDURE — 31500 INSERT EMERGENCY AIRWAY: CPT | Mod: HCNC

## 2025-07-09 PROCEDURE — 80175 DRUG SCREEN QUAN LAMOTRIGINE: CPT | Mod: HCNC | Performed by: EMERGENCY MEDICINE

## 2025-07-09 PROCEDURE — 83874 ASSAY OF MYOGLOBIN: CPT | Mod: HCNC

## 2025-07-09 PROCEDURE — 87040 BLOOD CULTURE FOR BACTERIA: CPT | Mod: HCNC

## 2025-07-09 PROCEDURE — 84478 ASSAY OF TRIGLYCERIDES: CPT | Mod: HCNC | Performed by: STUDENT IN AN ORGANIZED HEALTH CARE EDUCATION/TRAINING PROGRAM

## 2025-07-09 PROCEDURE — 25000003 PHARM REV CODE 250: Mod: HCNC

## 2025-07-09 PROCEDURE — 83605 ASSAY OF LACTIC ACID: CPT | Mod: HCNC

## 2025-07-09 PROCEDURE — 84443 ASSAY THYROID STIM HORMONE: CPT | Mod: HCNC | Performed by: STUDENT IN AN ORGANIZED HEALTH CARE EDUCATION/TRAINING PROGRAM

## 2025-07-09 PROCEDURE — 82550 ASSAY OF CK (CPK): CPT | Mod: HCNC | Performed by: STUDENT IN AN ORGANIZED HEALTH CARE EDUCATION/TRAINING PROGRAM

## 2025-07-09 PROCEDURE — 96365 THER/PROPH/DIAG IV INF INIT: CPT | Mod: HCNC

## 2025-07-09 PROCEDURE — 80307 DRUG TEST PRSMV CHEM ANLYZR: CPT | Mod: HCNC | Performed by: STUDENT IN AN ORGANIZED HEALTH CARE EDUCATION/TRAINING PROGRAM

## 2025-07-09 PROCEDURE — 99285 EMERGENCY DEPT VISIT HI MDM: CPT | Mod: 25,HCNC

## 2025-07-09 PROCEDURE — 99223 1ST HOSP IP/OBS HIGH 75: CPT | Mod: HCNC,,, | Performed by: EMERGENCY MEDICINE

## 2025-07-09 PROCEDURE — 99900035 HC TECH TIME PER 15 MIN (STAT): Mod: HCNC

## 2025-07-09 PROCEDURE — 94761 N-INVAS EAR/PLS OXIMETRY MLT: CPT | Mod: HCNC,XB

## 2025-07-09 PROCEDURE — 82550 ASSAY OF CK (CPK): CPT | Mod: HCNC

## 2025-07-09 PROCEDURE — 80053 COMPREHEN METABOLIC PANEL: CPT | Mod: HCNC

## 2025-07-09 PROCEDURE — 5A1945Z RESPIRATORY VENTILATION, 24-96 CONSECUTIVE HOURS: ICD-10-PCS | Performed by: STUDENT IN AN ORGANIZED HEALTH CARE EDUCATION/TRAINING PROGRAM

## 2025-07-09 PROCEDURE — 94002 VENT MGMT INPAT INIT DAY: CPT | Mod: HCNC

## 2025-07-09 PROCEDURE — 0BH17EZ INSERTION OF ENDOTRACHEAL AIRWAY INTO TRACHEA, VIA NATURAL OR ARTIFICIAL OPENING: ICD-10-PCS | Performed by: STUDENT IN AN ORGANIZED HEALTH CARE EDUCATION/TRAINING PROGRAM

## 2025-07-09 PROCEDURE — 81001 URINALYSIS AUTO W/SCOPE: CPT | Mod: HCNC | Performed by: STUDENT IN AN ORGANIZED HEALTH CARE EDUCATION/TRAINING PROGRAM

## 2025-07-09 PROCEDURE — 63600175 PHARM REV CODE 636 W HCPCS: Mod: HCNC

## 2025-07-09 PROCEDURE — 99900026 HC AIRWAY MAINTENANCE (STAT): Mod: HCNC

## 2025-07-09 PROCEDURE — 82077 ASSAY SPEC XCP UR&BREATH IA: CPT | Mod: HCNC

## 2025-07-09 PROCEDURE — 80143 DRUG ASSAY ACETAMINOPHEN: CPT | Mod: HCNC | Performed by: STUDENT IN AN ORGANIZED HEALTH CARE EDUCATION/TRAINING PROGRAM

## 2025-07-09 PROCEDURE — 85025 COMPLETE CBC W/AUTO DIFF WBC: CPT | Mod: HCNC

## 2025-07-09 RX ORDER — MAGNESIUM SULFATE 1 G/100ML
1 INJECTION INTRAVENOUS ONCE
Status: COMPLETED | OUTPATIENT
Start: 2025-07-09 | End: 2025-07-09

## 2025-07-09 RX ORDER — ENOXAPARIN SODIUM 100 MG/ML
40 INJECTION SUBCUTANEOUS EVERY 24 HOURS
Status: DISCONTINUED | OUTPATIENT
Start: 2025-07-10 | End: 2025-07-15 | Stop reason: HOSPADM

## 2025-07-09 RX ORDER — ROCURONIUM BROMIDE 10 MG/ML
1 INJECTION, SOLUTION INTRAVENOUS ONCE
Status: DISCONTINUED | OUTPATIENT
Start: 2025-07-09 | End: 2025-07-09

## 2025-07-09 RX ORDER — LEVOTHYROXINE SODIUM 75 UG/1
75 TABLET ORAL
Status: DISCONTINUED | OUTPATIENT
Start: 2025-07-10 | End: 2025-07-12

## 2025-07-09 RX ORDER — SODIUM CHLORIDE 0.9 % (FLUSH) 0.9 %
10 SYRINGE (ML) INJECTION
Status: DISCONTINUED | OUTPATIENT
Start: 2025-07-09 | End: 2025-07-15 | Stop reason: HOSPADM

## 2025-07-09 RX ORDER — FENTANYL CITRATE-0.9 % NACL/PF 10 MCG/ML
0-500 PLASTIC BAG, INJECTION (ML) INTRAVENOUS CONTINUOUS
Refills: 0 | Status: DISCONTINUED | OUTPATIENT
Start: 2025-07-09 | End: 2025-07-11

## 2025-07-09 RX ORDER — POTASSIUM CHLORIDE 7.45 MG/ML
10 INJECTION INTRAVENOUS
Status: COMPLETED | OUTPATIENT
Start: 2025-07-09 | End: 2025-07-09

## 2025-07-09 RX ORDER — PROPOFOL 10 MG/ML
0-50 INJECTION, EMULSION INTRAVENOUS CONTINUOUS
Status: DISCONTINUED | OUTPATIENT
Start: 2025-07-09 | End: 2025-07-10

## 2025-07-09 RX ORDER — ROCURONIUM BROMIDE 10 MG/ML
100 INJECTION, SOLUTION INTRAVENOUS
Status: COMPLETED | OUTPATIENT
Start: 2025-07-09 | End: 2025-07-09

## 2025-07-09 RX ORDER — POTASSIUM CHLORIDE 7.45 MG/ML
10 INJECTION INTRAVENOUS
Status: COMPLETED | OUTPATIENT
Start: 2025-07-09 | End: 2025-07-10

## 2025-07-09 RX ORDER — ETOMIDATE 2 MG/ML
28 INJECTION INTRAVENOUS
Status: COMPLETED | OUTPATIENT
Start: 2025-07-09 | End: 2025-07-09

## 2025-07-09 RX ORDER — ETOMIDATE 2 MG/ML
0.3 INJECTION INTRAVENOUS ONCE
Status: DISCONTINUED | OUTPATIENT
Start: 2025-07-09 | End: 2025-07-09

## 2025-07-09 RX ADMIN — POTASSIUM CHLORIDE 10 MEQ: 7.46 INJECTION, SOLUTION INTRAVENOUS at 10:07

## 2025-07-09 RX ADMIN — Medication 50 MCG/HR: at 11:07

## 2025-07-09 RX ADMIN — ROCURONIUM BROMIDE 100 MG: 10 INJECTION, SOLUTION INTRAVENOUS at 09:07

## 2025-07-09 RX ADMIN — PROPOFOL 15 MCG/KG/MIN: 10 INJECTION, EMULSION INTRAVENOUS at 09:07

## 2025-07-09 RX ADMIN — SODIUM CHLORIDE, POTASSIUM CHLORIDE, SODIUM LACTATE AND CALCIUM CHLORIDE 1000 ML: 600; 310; 30; 20 INJECTION, SOLUTION INTRAVENOUS at 09:07

## 2025-07-09 RX ADMIN — MAGNESIUM SULFATE HEPTAHYDRATE 1 G: 500 INJECTION, SOLUTION INTRAMUSCULAR; INTRAVENOUS at 10:07

## 2025-07-09 RX ADMIN — POTASSIUM CHLORIDE 10 MEQ: 7.46 INJECTION, SOLUTION INTRAVENOUS at 09:07

## 2025-07-09 RX ADMIN — ETOMIDATE 28 MG: 2 INJECTION INTRAVENOUS at 09:07

## 2025-07-10 PROBLEM — Z99.11 ON MECHANICALLY ASSISTED VENTILATION: Status: ACTIVE | Noted: 2025-07-09

## 2025-07-10 PROBLEM — T50.902A DRUG OVERDOSE, INTENTIONAL: Status: ACTIVE | Noted: 2025-07-10

## 2025-07-10 PROBLEM — T43.501A OVERDOSE OF ANTIPSYCHOTIC: Status: ACTIVE | Noted: 2025-07-10

## 2025-07-10 PROBLEM — T68.XXXA HYPOTHERMIA: Status: ACTIVE | Noted: 2025-07-10

## 2025-07-10 LAB
ALBUMIN SERPL BCP-MCNC: 3.7 G/DL (ref 3.5–5.2)
ALBUMIN SERPL BCP-MCNC: 3.9 G/DL (ref 3.5–5.2)
ALLENS TEST: ABNORMAL
ALP SERPL-CCNC: 36 UNIT/L (ref 40–150)
ALP SERPL-CCNC: 41 UNIT/L (ref 40–150)
ALT SERPL W/O P-5'-P-CCNC: 21 UNIT/L (ref 10–44)
ALT SERPL W/O P-5'-P-CCNC: 22 UNIT/L (ref 10–44)
ANION GAP (OHS): 7 MMOL/L (ref 8–16)
ANION GAP (OHS): 8 MMOL/L (ref 8–16)
ANION GAP (OHS): 8 MMOL/L (ref 8–16)
ANION GAP (OHS): 9 MMOL/L (ref 8–16)
AST SERPL-CCNC: 15 UNIT/L (ref 11–45)
AST SERPL-CCNC: 17 UNIT/L (ref 11–45)
BILIRUB SERPL-MCNC: 0.7 MG/DL (ref 0.1–1)
BILIRUB SERPL-MCNC: 0.7 MG/DL (ref 0.1–1)
BUN SERPL-MCNC: 11 MG/DL (ref 6–20)
BUN SERPL-MCNC: 11 MG/DL (ref 6–20)
BUN SERPL-MCNC: 12 MG/DL (ref 6–20)
BUN SERPL-MCNC: 19 MG/DL (ref 6–20)
CALCIUM SERPL-MCNC: 7.2 MG/DL (ref 8.7–10.5)
CALCIUM SERPL-MCNC: 8 MG/DL (ref 8.7–10.5)
CALCIUM SERPL-MCNC: 8 MG/DL (ref 8.7–10.5)
CALCIUM SERPL-MCNC: 8.2 MG/DL (ref 8.7–10.5)
CHLORIDE SERPL-SCNC: 106 MMOL/L (ref 95–110)
CHLORIDE SERPL-SCNC: 106 MMOL/L (ref 95–110)
CHLORIDE SERPL-SCNC: 109 MMOL/L (ref 95–110)
CHLORIDE SERPL-SCNC: 109 MMOL/L (ref 95–110)
CO2 SERPL-SCNC: 20 MMOL/L (ref 23–29)
CO2 SERPL-SCNC: 21 MMOL/L (ref 23–29)
CO2 SERPL-SCNC: 22 MMOL/L (ref 23–29)
CO2 SERPL-SCNC: 24 MMOL/L (ref 23–29)
CREAT SERPL-MCNC: 0.8 MG/DL (ref 0.5–1.4)
CREAT SERPL-MCNC: 0.8 MG/DL (ref 0.5–1.4)
CREAT SERPL-MCNC: 1 MG/DL (ref 0.5–1.4)
CREAT SERPL-MCNC: 1.1 MG/DL (ref 0.5–1.4)
CREAT UR-MCNC: 221 MG/DL (ref 23–375)
DELSYS: ABNORMAL
ERYTHROCYTE [DISTWIDTH] IN BLOOD BY AUTOMATED COUNT: 12.4 % (ref 11.5–14.5)
ERYTHROCYTE [SEDIMENTATION RATE] IN BLOOD BY WESTERGREN METHOD: 18 MM/H
FENTANYL UR QL SCN: NEGATIVE
FIO2: 30
GFR SERPLBLD CREATININE-BSD FMLA CKD-EPI: >60 ML/MIN/1.73/M2
GLUCOSE SERPL-MCNC: 108 MG/DL (ref 70–110)
GLUCOSE SERPL-MCNC: 132 MG/DL (ref 70–110)
GLUCOSE SERPL-MCNC: 85 MG/DL (ref 70–110)
GLUCOSE SERPL-MCNC: 98 MG/DL (ref 70–110)
HCO3 UR-SCNC: 21.7 MMOL/L (ref 24–28)
HCT VFR BLD AUTO: 37.3 % (ref 40–54)
HCT VFR BLD CALC: 37 %PCV (ref 36–54)
HGB BLD-MCNC: 12.4 GM/DL (ref 14–18)
HOLD SPECIMEN: NORMAL
LACTATE SERPL-SCNC: 2 MMOL/L (ref 0.5–2.2)
MAGNESIUM SERPL-MCNC: 2 MG/DL (ref 1.6–2.6)
MCH RBC QN AUTO: 29.7 PG (ref 27–31)
MCHC RBC AUTO-ENTMCNC: 33.2 G/DL (ref 32–36)
MCV RBC AUTO: 89 FL (ref 82–98)
MODE: ABNORMAL
MRSA PCR SCRN (OHS): NOT DETECTED
NUCLEATED RBC (/100WBC) (OHS): 0 /100 WBC
OHS QRS DURATION: 108 MS
OHS QRS DURATION: 112 MS
OHS QTC CALCULATION: 513 MS
OHS QTC CALCULATION: 535 MS
PCO2 BLDA: 44.5 MMHG (ref 35–45)
PEEP: 5
PH SMN: 7.3 [PH] (ref 7.35–7.45)
PHOSPHATE SERPL-MCNC: 2.3 MG/DL (ref 2.7–4.5)
PLATELET # BLD AUTO: 228 K/UL (ref 150–450)
PMV BLD AUTO: 10.6 FL (ref 9.2–12.9)
PO2 BLDA: 52 MMHG (ref 40–60)
POC BE: -5 MMOL/L (ref -2–2)
POC IONIZED CALCIUM: 1.2 MMOL/L (ref 1.06–1.42)
POC SATURATED O2: 83 % (ref 95–100)
POC TCO2: 23 MMOL/L (ref 24–29)
POTASSIUM BLD-SCNC: 3.3 MMOL/L (ref 3.5–5.1)
POTASSIUM SERPL-SCNC: 3.4 MMOL/L (ref 3.5–5.1)
POTASSIUM SERPL-SCNC: 3.5 MMOL/L (ref 3.5–5.1)
POTASSIUM SERPL-SCNC: 3.5 MMOL/L (ref 3.5–5.1)
POTASSIUM SERPL-SCNC: 4.5 MMOL/L (ref 3.5–5.1)
PROT SERPL-MCNC: 5.9 GM/DL (ref 6–8.4)
PROT SERPL-MCNC: 6 GM/DL (ref 6–8.4)
RBC # BLD AUTO: 4.18 M/UL (ref 4.6–6.2)
SAMPLE: ABNORMAL
SITE: ABNORMAL
SODIUM BLD-SCNC: 138 MMOL/L (ref 136–145)
SODIUM SERPL-SCNC: 136 MMOL/L (ref 136–145)
SODIUM SERPL-SCNC: 137 MMOL/L (ref 136–145)
SODIUM SERPL-SCNC: 137 MMOL/L (ref 136–145)
SODIUM SERPL-SCNC: 139 MMOL/L (ref 136–145)
VT: 450
WBC # BLD AUTO: 8.54 K/UL (ref 3.9–12.7)

## 2025-07-10 PROCEDURE — 99900035 HC TECH TIME PER 15 MIN (STAT): Mod: HCNC

## 2025-07-10 PROCEDURE — 63600175 PHARM REV CODE 636 W HCPCS: Mod: HCNC | Performed by: INTERNAL MEDICINE

## 2025-07-10 PROCEDURE — 27100171 HC OXYGEN HIGH FLOW UP TO 24 HOURS: Mod: HCNC

## 2025-07-10 PROCEDURE — 63600175 PHARM REV CODE 636 W HCPCS: Mod: HCNC

## 2025-07-10 PROCEDURE — 99291 CRITICAL CARE FIRST HOUR: CPT | Mod: FS,HCNC,, | Performed by: INTERNAL MEDICINE

## 2025-07-10 PROCEDURE — 94761 N-INVAS EAR/PLS OXIMETRY MLT: CPT | Mod: HCNC

## 2025-07-10 PROCEDURE — 80307 DRUG TEST PRSMV CHEM ANLYZR: CPT | Mod: HCNC | Performed by: EMERGENCY MEDICINE

## 2025-07-10 PROCEDURE — 20000000 HC ICU ROOM: Mod: HCNC

## 2025-07-10 PROCEDURE — 25000003 PHARM REV CODE 250: Mod: HCNC

## 2025-07-10 PROCEDURE — 95714 VEEG EA 12-26 HR UNMNTR: CPT | Mod: HCNC

## 2025-07-10 PROCEDURE — 99499 UNLISTED E&M SERVICE: CPT | Mod: HCNC,,,

## 2025-07-10 PROCEDURE — 94003 VENT MGMT INPAT SUBQ DAY: CPT | Mod: HCNC

## 2025-07-10 PROCEDURE — 95700 EEG CONT REC W/VID EEG TECH: CPT | Mod: HCNC

## 2025-07-10 PROCEDURE — 85025 COMPLETE CBC W/AUTO DIFF WBC: CPT | Mod: HCNC

## 2025-07-10 PROCEDURE — 83735 ASSAY OF MAGNESIUM: CPT | Mod: HCNC

## 2025-07-10 PROCEDURE — 99292 CRITICAL CARE ADDL 30 MIN: CPT | Mod: FS,HCNC,, | Performed by: INTERNAL MEDICINE

## 2025-07-10 PROCEDURE — 99231 SBSQ HOSP IP/OBS SF/LOW 25: CPT | Mod: HCNC,,, | Performed by: STUDENT IN AN ORGANIZED HEALTH CARE EDUCATION/TRAINING PROGRAM

## 2025-07-10 PROCEDURE — 25000003 PHARM REV CODE 250: Mod: HCNC | Performed by: GENERAL ACUTE CARE HOSPITAL

## 2025-07-10 PROCEDURE — 87641 MR-STAPH DNA AMP PROBE: CPT | Mod: HCNC

## 2025-07-10 PROCEDURE — 99900026 HC AIRWAY MAINTENANCE (STAT): Mod: HCNC

## 2025-07-10 PROCEDURE — 80048 BASIC METABOLIC PNL TOTAL CA: CPT | Mod: HCNC

## 2025-07-10 PROCEDURE — 83605 ASSAY OF LACTIC ACID: CPT | Mod: HCNC

## 2025-07-10 PROCEDURE — 93010 ELECTROCARDIOGRAM REPORT: CPT | Mod: HCNC,,, | Performed by: INTERNAL MEDICINE

## 2025-07-10 PROCEDURE — 93005 ELECTROCARDIOGRAM TRACING: CPT | Mod: HCNC | Performed by: INTERNAL MEDICINE

## 2025-07-10 PROCEDURE — 84100 ASSAY OF PHOSPHORUS: CPT | Mod: HCNC

## 2025-07-10 PROCEDURE — 95720 EEG PHY/QHP EA INCR W/VEEG: CPT | Mod: HCNC,,, | Performed by: PSYCHIATRY & NEUROLOGY

## 2025-07-10 PROCEDURE — 80053 COMPREHEN METABOLIC PANEL: CPT | Mod: HCNC

## 2025-07-10 RX ORDER — LANOLIN ALCOHOL/MO/W.PET/CERES
800 CREAM (GRAM) TOPICAL
Status: DISCONTINUED | OUTPATIENT
Start: 2025-07-10 | End: 2025-07-14

## 2025-07-10 RX ORDER — MAGNESIUM SULFATE HEPTAHYDRATE 40 MG/ML
2 INJECTION, SOLUTION INTRAVENOUS ONCE
Status: COMPLETED | OUTPATIENT
Start: 2025-07-11 | End: 2025-07-11

## 2025-07-10 RX ORDER — PROPOFOL 10 MG/ML
0-50 INJECTION, EMULSION INTRAVENOUS CONTINUOUS
Status: DISCONTINUED | OUTPATIENT
Start: 2025-07-10 | End: 2025-07-11

## 2025-07-10 RX ORDER — MAGNESIUM SULFATE HEPTAHYDRATE 40 MG/ML
2 INJECTION, SOLUTION INTRAVENOUS ONCE
Status: COMPLETED | OUTPATIENT
Start: 2025-07-10 | End: 2025-07-10

## 2025-07-10 RX ORDER — SODIUM,POTASSIUM PHOSPHATES 280-250MG
2 POWDER IN PACKET (EA) ORAL
Status: DISCONTINUED | OUTPATIENT
Start: 2025-07-10 | End: 2025-07-14

## 2025-07-10 RX ORDER — SODIUM,POTASSIUM PHOSPHATES 280-250MG
1 POWDER IN PACKET (EA) ORAL ONCE
Status: COMPLETED | OUTPATIENT
Start: 2025-07-10 | End: 2025-07-10

## 2025-07-10 RX ORDER — CLONAZEPAM 2 MG/1
4 TABLET ORAL NIGHTLY
Status: ON HOLD | COMMUNITY
End: 2025-07-22 | Stop reason: HOSPADM

## 2025-07-10 RX ORDER — DEXMEDETOMIDINE HYDROCHLORIDE 4 UG/ML
0-1.4 INJECTION, SOLUTION INTRAVENOUS CONTINUOUS
Status: DISCONTINUED | OUTPATIENT
Start: 2025-07-10 | End: 2025-07-10

## 2025-07-10 RX ADMIN — POTASSIUM CHLORIDE 10 MEQ: 7.46 INJECTION, SOLUTION INTRAVENOUS at 02:07

## 2025-07-10 RX ADMIN — POTASSIUM CHLORIDE 10 MEQ: 7.46 INJECTION, SOLUTION INTRAVENOUS at 01:07

## 2025-07-10 RX ADMIN — MAGNESIUM SULFATE HEPTAHYDRATE 2 G: 40 INJECTION, SOLUTION INTRAVENOUS at 09:07

## 2025-07-10 RX ADMIN — PROPOFOL 30 MCG/KG/MIN: 10 INJECTION, EMULSION INTRAVENOUS at 03:07

## 2025-07-10 RX ADMIN — POTASSIUM & SODIUM PHOSPHATES POWDER PACK 280-160-250 MG 1 PACKET: 280-160-250 PACK at 04:07

## 2025-07-10 RX ADMIN — PROPOFOL 20 MCG/KG/MIN: 10 INJECTION, EMULSION INTRAVENOUS at 07:07

## 2025-07-10 RX ADMIN — POTASSIUM BICARBONATE 50 MEQ: 977.5 TABLET, EFFERVESCENT ORAL at 01:07

## 2025-07-10 RX ADMIN — PROPOFOL 10 MCG/KG/MIN: 10 INJECTION, EMULSION INTRAVENOUS at 12:07

## 2025-07-10 RX ADMIN — POTASSIUM CHLORIDE 10 MEQ: 7.46 INJECTION, SOLUTION INTRAVENOUS at 12:07

## 2025-07-10 RX ADMIN — PROPOFOL 20 MCG/KG/MIN: 10 INJECTION, EMULSION INTRAVENOUS at 01:07

## 2025-07-10 RX ADMIN — POTASSIUM BICARBONATE 40 MEQ: 391 TABLET, EFFERVESCENT ORAL at 04:07

## 2025-07-10 RX ADMIN — DEXMEDETOMIDINE HYDROCHLORIDE 0.2 MCG/KG/HR: 4 INJECTION INTRAVENOUS at 09:07

## 2025-07-10 RX ADMIN — PROPOFOL 35 MCG/KG/MIN: 10 INJECTION, EMULSION INTRAVENOUS at 05:07

## 2025-07-10 RX ADMIN — ENOXAPARIN SODIUM 40 MG: 40 INJECTION SUBCUTANEOUS at 05:07

## 2025-07-10 RX ADMIN — LEVOTHYROXINE SODIUM 75 MCG: 75 TABLET ORAL at 04:07

## 2025-07-10 NOTE — ED PROVIDER NOTES
Encounter Date: 7/9/2025       History     Chief Complaint   Patient presents with    Drug Overdose     Possible drug overdose. Pt found next to empty seroquel pill bottle unresponsive     The history is provided by the patient.     Patient is a 44-year-old male with a past medical history of schizoaffective disorder, hypothyroidism, hypertension, hyperlipidemia who presents due to altered mental status.  Per the patient's father, he had mentioned a few days ago that he was feeling depressed and had made suicidal threats.  Per EMS, it was noted that there was an empty bottle of Seroquel at his bedside.  He had a prescription filled for Seroquel, 300 mg, 180 tablets.  It is noted that maybe 7 of the tablets were in a week dispenser container, but otherwise patient's father is concerned that he took all of these pills.  EMS noted at the bedside that the patient had his Klonopin/levothyroxine pill bottles completely full.  Patient's father states that he tried calling the patient all day and did not get any responses so he went over to the patient's house, at which time he found the patient altered and unresponsive which is what prompted him to call EMS.  Further history limited given the patient's altered mental status.    Review of patient's allergies indicates:  No Known Allergies  History reviewed. No pertinent past medical history.  History reviewed. No pertinent surgical history.  No family history on file.  Social History[1]      Physical Exam     Initial Vitals   BP Pulse Resp Temp SpO2   07/09/25 2100 07/09/25 2100 07/09/25 2100 07/09/25 2104 07/09/25 2100   (!) 176/91 (!) 115 14 97.6 °F (36.4 °C) 99 %      MAP       --                Physical Exam    Nursing note and vitals reviewed.  Constitutional: He appears well-developed. He appears lethargic. He appears distressed.   Not responding to any verbal/physical stimuli.  He is awake and was slightly open up his eyes but otherwise minimally  responsive/significantly depressed exam   HENT:   Head: Normocephalic and atraumatic. Mouth/Throat: Oropharynx is clear and moist and mucous membranes are normal.   Eyes: EOM are normal. Pupils are equal, round, and reactive to light.   Neck:   Normal range of motion.  Cardiovascular:  Normal rate, regular rhythm and intact distal pulses.           Pulmonary/Chest:   Stridorous/gurgling noises noted with many of his breaths.  He does not appear to be tachypneic however   Abdominal: He exhibits no distension.   Musculoskeletal:         General: No edema.      Cervical back: Normal range of motion.     Neurological: He appears lethargic. He is disoriented. GCS eye subscore is 2. GCS verbal subscore is 1. GCS motor subscore is 2.   Limited ability to assess given his mental status.   Skin: Skin is warm.   Psychiatric:   Can not assess given the patient's mental status         ED Course   Intubation    Date/Time: 7/9/2025 9:38 PM  Location procedure was performed: [facility] EMERGENCY DEPARTMENT    Performed by: Nicholas Jeong MD  Authorized by: Dayday Haro MD  Assisting provider: Dayday Haro MD  Consent Done: Emergent Situation  Indications: airway protection  Intubation method: video-assisted  Patient status: paralyzed (RSI)  Preoxygenation: BVM  Sedatives: etomidate  Paralytic: rocuronium  Laryngoscope size: Mac 4  Tube size: 7.5 mm  Tube type: cuffed  Number of attempts: 1  Cricoid pressure: no  Cords visualized: yes  Post-procedure assessment: chest rise and ETCO2 monitor  Breath sounds: clear  Cuff inflated: yes  ETT to lip: 25 cm  ETT to teeth: 24 cm  Tube secured with: ETT matson and adhesive tape  Chest x-ray interpreted by me.  Chest x-ray findings: endotracheal tube in appropriate position  Patient tolerance: Patient tolerated the procedure well with no immediate complications  Complications: No  Specimens: No  Implants: No        Labs Reviewed   COMPREHENSIVE METABOLIC PANEL - Abnormal        Result Value    Sodium 138      Potassium 2.9 (*)     Chloride 108      CO2 18 (*)     Glucose 156 (*)     BUN 12      Creatinine 0.8      Calcium 8.3 (*)     Protein Total 6.3      Albumin 4.0      Bilirubin Total 0.6      ALP 40      AST 17      ALT 23      Anion Gap 12      eGFR >60     CBC WITH DIFFERENTIAL - Abnormal    WBC 6.62      RBC 4.33 (*)     HGB 12.8 (*)     HCT 38.3 (*)     MCV 89      MCH 29.6      MCHC 33.4      RDW 12.1      Platelet Count 208      MPV 10.3      Nucleated RBC 0      Neut % 80.3 (*)     Lymph % 14.0 (*)     Mono % 4.5      Eos % 0.6      Basophil % 0.3      Imm Grans % 0.3      Neut # 5.31      Lymph # 0.93 (*)     Mono # 0.30      Eos # 0.04      Baso # 0.02      Imm Grans # 0.02     ACETAMINOPHEN LEVEL - Abnormal    Acetaminophen Level <3.0 (*)    SALICYLATE LEVEL - Abnormal    Salicylate Level <5.0 (*)    URINALYSIS, REFLEX TO URINE CULTURE - Abnormal    Color, UA Yellow      Appearance, UA Clear      pH, UA 6.0      Spec Grav UA >=1.030 (*)     Protein, UA 2+ (*)     Glucose, UA 1+ (*)     Ketones, UA Negative      Bilirubin, UA Negative      Blood, UA 2+ (*)     Nitrites, UA Negative      Urobilinogen, UA Negative      Leukocyte Esterase, UA Negative     DRUG SCREEN PANEL, URINE EMERGENCY - Abnormal    Benzodiazepine, Urine Presumptive Positive (*)     Methadone, Urine Negative      Cocaine, Urine Negative      Opiates, Urine Negative      Barbiturates, Urine Negative      Amphetamines, Urine Negative      THC Presumptive Positive (*)     Phencyclidine, Urine Negative      Urine Creatinine 223.0      Narrative:     This screen includes the following classes of drugs at the listed cut-off:     Benzodiazepines:        200 ng/ml   Methadone:              300 ng/ml   Cocaine metabolite:     300 ng/ml   Opiates:                300 ng/ml   Barbiturates:           200 ng/ml   Amphetamines:           1000 ng/ml   Marijuana metabs (THC): 50 ng/ml   Phencyclidine (PCP):    25 ng/ml  "    This is a screening test. If results do not correlate with clinical presentation, then a confirmatory send out test is advised.    This report is intended for use in clinical monitoring and management of patients. It is not intended for use in employment related drug testing."   ISTAT PROCEDURE - Abnormal    POC Glucose 162 (*)     POC BUN 12      POC Creatinine 0.8      POC Sodium 138      POC Potassium 2.9 (*)     POC Chloride 106      POC TCO2 (MEASURED) 19 (*)     POC Ionized Calcium 1.08      POC Hematocrit 39      Sample JARED     MAGNESIUM - Normal    Magnesium  1.7     CK - Normal    CPK 93     TSH - Normal    TSH 0.724     ALCOHOL,MEDICAL (ETHANOL) - Normal    Alcohol, Serum <10     TRIGLYCERIDES - Normal    Triglyceride 79     CK - Normal    CPK 92     CULTURE, BLOOD   CULTURE, BLOOD   MRSA SCREEN BY PCR   CBC W/ AUTO DIFFERENTIAL    Narrative:     The following orders were created for panel order CBC auto differential.  Procedure                               Abnormality         Status                     ---------                               -----------         ------                     CBC with Differential[[id number]]       Abnormal            Final result                 Please view results for these tests on the individual orders.   URINALYSIS MICROSCOPIC    RBC, UA 3      WBC, UA 1      Squamous Epithelial Cells, UA <1      Microscopic Comment       GREY TOP URINE HOLD   MYOGLOBIN, URINE   BASIC METABOLIC PANEL   FENTANYL, URINE   DRUG SCREEN PANEL, URINE EMERGENCY   DRUG SCREEN,  PRESCRIPTION/OTC, URINE          Imaging Results              X-Ray Chest 1 View (In process)                      CT Head Without Contrast (Final result)  Result time 07/09/25 21:22:04      Final result by Ravi Aleman MD (07/09/25 21:22:04)                   Impression:      No acute intracranial abnormalities.    Partially visualized tube in the right nasopharynx.      Electronically signed by: Ravi " MD Ash  Date:    07/09/2025  Time:    21:22               Narrative:    EXAMINATION:  CT HEAD WITHOUT CONTRAST    CLINICAL HISTORY:  Mental status change, unknown cause;    TECHNIQUE:  Low dose axial images were obtained through the head.  Coronal and sagittal reformations were also performed. Contrast was not administered.    COMPARISON:  None.    FINDINGS:  The brain parenchyma appears normal for age with good corticomedullary differentiation.  There is no evidence of acute infarct, hemorrhage, or mass.  The ventricular system is normal in size.  No mass-effect or midline shift.  There are no abnormal extra-axial fluid collections.  Partial opacification posterior ethmoid air cells.  Remaining visualized paranasal sinuses and mastoid air cells are clear.  The calvarium appears intact. Partially visualized nasal pharyngeal tube in the right nasopharynx.                                       Medications   propofol (DIPRIVAN) 10 mg/mL infusion (15 mcg/kg/min × 96 kg Intravenous New Bag 7/9/25 2122)   sodium chloride 0.9% flush 10 mL (has no administration in time range)   enoxaparin injection 40 mg (has no administration in time range)   levothyroxine tablet 75 mcg (has no administration in time range)   lactated ringers bolus 1,000 mL (0 mLs Intravenous Stopped 7/9/25 2209)   etomidate injection 28 mg (28 mg Intravenous Given 7/9/25 2119)   rocuronium injection 100 mg (100 mg Intravenous Given 7/9/25 2119)   potassium chloride 10 mEq in 100 mL IVPB (10 mEq Intravenous New Bag 7/9/25 2243)   magnesium sulfate in dextrose IVPB (premix) 1 g (0 g Intravenous Stopped 7/9/25 2309)     Medical Decision Making  Patient is a 44-year-old male who presents due to concerns of altered mental status.  Given the description of the story/known history of schizoaffective/threatening suicidal comments and the empty pill bottle of Seroquel, high clinical suspicion for an overdose secondary to ingesting Seroquel.  Patient noted to  be tachycardic and with a GCS of 4-5.  He would not talk or answer any questions/follow any commands.  He was making large stridorous breathing noises on our initial evaluation.  He was immediately taken to receive a head CT out of concerns for a possible intracranial hemorrhage, however this was shown to be unremarkable.  Given his on reassuring clinical status as well as his inability to protect his airway, decision made to move forward with intubation which was performed successfully.  Patient was placed on a propofol drip.    Initial lactate shown to be elevated to 4.2, although he received 800 mL of IV fluid with EMS, patient given an additional 1000 mL here in the emergency department.  Potassium initially shown to be 2.9 so given 20 mEq of IV potassium.    Poison control was contacted given the concerns for ingestion, and after evaluating the patient, they recommended giving a g of magnesium sulfate in the setting of QTC prolongation noted on EKG.  They stated to look out for signs of anticholinergic syndrome/supportive care would be the mainstay treatment.    Patient placed on Carlos A Hugger given significantly decreased core temperature which was obtained from temp sensing Webb.  MICU consult placed and after seeing the patient, agreed to accept to their service.  Patient to be admitted to the MICU.    Amount and/or Complexity of Data Reviewed  Labs: ordered.  Radiology: ordered.    Risk  Prescription drug management.  Decision regarding hospitalization.                                      Clinical Impression:  Final diagnoses:  [R41.82] AMS (altered mental status)  [R06.89] Respiratory depression  [R06.89] Respiratory depression - tube placement  [Z97.8] Endotracheally intubated          ED Disposition Condition    Admit                         [1]   Social History  Tobacco Use    Smoking status: Former     Types: Cigarettes, Vaping w/o nicotine, Vaping with nicotine    Smokeless tobacco: Current    Tobacco  comments:     Patient states I vape occasionally    Substance Use Topics    Alcohol use: Not Currently    Drug use: Yes     Types: Marijuana     Comment: Patient states I smoke every night and it is prescribed for anxiety        Nicholas Jeong MD  Resident  07/09/25 2438

## 2025-07-10 NOTE — ED TRIAGE NOTES
Ravi GARCIA Shamar, a 44 y.o. male presents to the ED w/ complaint of unresponsive. Per ems found by family. Empty bottle of seroquel which was filled on the first nearby.  On arrival to ER pt only responsive to painful stimuli with irregular breathing pattern.    Triage note:  Chief Complaint   Patient presents with    Drug Overdose     Possible drug overdose. Pt found next to empty seroquel pill bottle unresponsive     Review of patient's allergies indicates:  No Known Allergies  No past medical history on file.

## 2025-07-10 NOTE — CONSULTS
Patient seen and evaluated by critical care medicine. To be admitted to ICU for further management. Full H&P to follow.     Claudia Mayer PA-C  Pulmonary Critical Care Medicine   07/09/2025

## 2025-07-10 NOTE — CONSULTS
Medical Toxicology  Consult Note    Patient Name: Ravi Ibanez  MRN: [id number]  Admission Date: 7/9/2025  Hospital Length of Stay: 0 days  Attending Physician: Joni Espino*   Primary Care Provider: Dk Diaz MD     ASSESSMENT:  QUETIAPINE TOXICITY  RECOMMENDATIONS   1.  Combination of profound CNS depression, mydriasis, tachycardia, QT prolongation is consistent with classic quetiapine toxicity  -expected labile blood pressure, profound sedation, tachycardia and/or hypotension for 24-72 hours  2.  If patient develops refractory hypotension with signs of hypoperfusion, norepinephrine or phenylephrine is typically the pressor of choice in the setting of quetiapine induced alpha blockade.  3.  patient is significantly hypothermic.    -This could represent quetiapine affect, concomitant ingestion of other medications that has baclofen or barbiturate.    -Continue active external warming  -Could also represent uncontrolled hypothyroidism.  Defer management to primary team  4.  Additional prescriptions noted for metoprolol 50 mg XL, clonazepam 2 mg, vortioxetine 20 mg, lamotrigine 200 mg, risperidone 3 mg, fenofibrate 160 mg, levothyroxine 75 mcg, and lisinopril of unknown strength   -vital signs and presentation are less consistent with large overdoses of these medications.   -lamotrigine level sent   -EKG is not consistent with sodium channel blockade secondary to vortioxetine, lamotrigine overdose   -no psychomotor agitation or autonomic instability to suggest serotonin syndrome  _______________________________________________________________________________    Patient information was obtained from ER records and primary team.     REASON FOR CONSULT  Chief Complaint   Patient presents with    Drug Overdose     Possible drug overdose. Pt found next to empty seroquel pill bottle unresponsive        Inpatient Consult to Medical Toxicology  Consult performed by: Raul Cruz MD  Consult  ordered by: Nicholas Jeong MD        Subjective:     Principal Problem:Acute encephalopathy    Chief Complaint:   Chief Complaint   Patient presents with    Drug Overdose     Possible drug overdose. Pt found next to empty seroquel pill bottle unresponsive        HPI:  44-year-old male with history of schizoaffective disorder, hypothyroidism, hypertension hyperlipidemia.  The patient presented with altered mental status it had some recent suicidal ideation voice to his father.  EMS noted an empty bottle of quetiapine at his bedside.  He had a prescription for quetiapine 300 mg, 180 tablets with only 7 tablets present and a pill reminder.  His clonazepam and levothyroxine pill bottles appeared full.  Patient was last known well yesterday and found unresponsive by his father today.  Patient is sedated and intubated and unable to provide any history    History reviewed. No pertinent past medical history.    History reviewed. No pertinent surgical history.    Review of patient's allergies indicates:  No Known Allergies    No current facility-administered medications on file prior to encounter.     Current Outpatient Medications on File Prior to Encounter   Medication Sig    clonazePAM (KLONOPIN) 2 MG Tab     fenofibrate 160 MG Tab     LAMICTAL 200 mg tablet Take 200 mg by mouth once daily.    levothyroxine (SYNTHROID) 75 MCG tablet     LISINOPRIL ORAL Take by mouth.    metoprolol succinate (TOPROL-XL) 50 MG 24 hr tablet     RISPERDAL 3 mg Tab Take 3 mg by mouth nightly.    SEROQUEL 300 mg Tab Take 300 mg by mouth nightly.    TRINTELLIX 20 mg Tab      Family History    None       Tobacco Use    Smoking status: Former     Types: Cigarettes, Vaping w/o nicotine, Vaping with nicotine    Smokeless tobacco: Current    Tobacco comments:     Patient states I vape occasionally    Substance and Sexual Activity    Alcohol use: Not Currently    Drug use: Yes     Types: Marijuana     Comment: Patient states I smoke every night and  it is prescribed for anxiety    Sexual activity: Not on file     Review of Systems  All other systems reviewed and negative except as noted in HPI    Objective:     Vital Signs (Most Recent):  Temp: (!) 94.5 °F (34.7 °C) (07/09/25 2300)  Pulse: 94 (07/09/25 2300)  Resp: (!) 21 (07/09/25 2247)  BP: (!) 142/71 (07/09/25 2300)  SpO2: 100 % (07/09/25 2300) Vital Signs (24h Range):  Temp:  [94.5 °F (34.7 °C)-97.6 °F (36.4 °C)] 94.5 °F (34.7 °C)  Pulse:  [] 94  Resp:  [11-23] 21  SpO2:  [98 %-100 %] 100 %  BP: (142-176)/(71-91) 142/71     Weight: 96 kg (211 lb 10.3 oz)  Body mass index is 29.52 kg/m².    Physical Exam  General:  Sedated and intubated.  Well nourished. Well Developed  Head: Normocephalic and Atraumatic  HEENT:  Bilateral mydriasis.  Pupils equal  Neck: Supple  Cardiovascular: RRR. No m/r/g. 2+ Distal Pulses  Pulm/Chest:  Mechanically ventilated  Abdomen: Nontender. Nondistended. No rigidity, rebound, or guarding  MSK: extremities atraumatic x 4.  Ext: no clubbing, cyanosis, or edema  Neuro:  No clonus.  No rigidity  Skin: no bullae, petechiae, or purpura.  Cool, dry, and intact.  Psych:  Unable to assess    Significant Labs: All pertinent labs within the past 24 hours have been reviewed.  Recent Lab Results  (Last 5 results in the past 24 hours)        07/09/25 2142 07/09/25 2139 07/09/25 2121 07/09/25 2105 07/09/25 2058        Base Deficit       -5.0         Performed By:       HOLLY         Correct Temperature (PH)       7.323         Corrected Temperature (pCO2)       39.8         Corrected Temperature (pO2)       51.5         Phencyclidine   Negative             Specimen source       Venous         Acetaminophen Level         <3.0  Comment: Toxic Levels:  Adults (4 hr post-ingestion).........>150 ug/mL  Adults (12 hr post-ingestion)........>40 ug/mL  Peds (2 hr post-ingestion, liquid)...>225 ug/mL       Albumin         4.0       Alcohol, Serum <10               ALP         40        ALT         23       Amphetamines, Urine   Negative             Anion Gap         12       Appearance, UA   Clear             AST         17       Barbituates, Urine   Negative             Baso #         0.02       Basophil %         0.3       Benzodiazepine, Urine   Presumptive Positive             Bilirubin (UA)   Negative             BILIRUBIN TOTAL         0.6  Comment: For infants and newborns, interpretation of results should be based   on gestational age, weight and in agreement with clinical   observations.    Premature Infant recommended reference ranges:   0-24 hours:  <8.0 mg/dL   24-48 hours: <12.0 mg/dL   3-5 days:    <15.0 mg/dL   6-29 days:   <15.0 mg/dL       BIPAP       0         BUN         12       Calcium         8.3       Chloride         108       CO2         18       Cocaine, Urine   Negative             Color, UA   Yellow             CPK 92         93       Creatinine         0.8       Urine Creatinine   223.0             eGFR         >60  Comment: Estimated GFR calculated using the CKD-EPI creatinine (2021) equation.       Eos #         0.04       Eos %         0.6       FiO2       21.0         Glucose         156       Glucose, UA   1+             Gran # (ANC)         5.31       Hematocrit         38.3       Hemoglobin         12.8       Immature Grans (Abs)         0.02  Comment: Mild elevation in immature granulocytes is non specific and can be seen in a variety of conditions including stress response, acute inflammation, trauma and pregnancy. Correlation with other laboratory and clinical findings is essential.       Immature Granulocytes         0.3       Ketones, UA   Negative             Leukocyte Esterase, UA   Negative             Lymph #         0.93       Lymph %         14.0       Magnesium          1.7       MCH         29.6       MCHC         33.4       MCV         89       Methadone Screen, Urine   Negative             Microscopic Comment     Comment: Other formed elements  not mentioned in the report are not present in the microscopic examination.             Mono #         0.30       Mono %         4.5       MPV         10.3       Neut %         80.3       NITRITE UA   Negative             nRBC         0       Blood, UA   2+             Opiates, Urine   Negative             pH, UA   6.0             Platelet Count         208       POC BUN     12           POC Chloride     106           POC Creatinine     0.8           POC Glucose     162           POC HCO3       20.0         POC Hematocrit     39   41.1         POC Ionized Calcium     1.08           POC Lactate       4.2  Comment:  notified  at    read back  Value above critical limit           POC PCO2       39.8         POC PH       7.323  Comment: Value below reference range         POC PO2       51.5         POC Potassium     2.9           POC Sodium     138           POC TCO2 (MEASURED)     19           POC Temp       37.0         Potassium         2.9       PROTEIN TOTAL         6.3       Protein, UA   2+  Comment: Recommend a 24 hour urine protein or a urine protein/creatinine ratio if globulin induced proteinuria is clinically suspected.             RBC         4.33       RBC, UA   3             RDW         12.1       Salicylate Level         <5.0  Comment: Toxic:  30.0 - 70.0 mg/dl  Lethal: >70.0 mg/dl         Sample     JARED           Sodium         138       Spec Grav UA   >=1.030             Squam Epithel, UA   <1             Marijuana (THC) Metabolite   Presumptive Positive             Triglycerides 79  Comment: The National Cholesterol Education Program (NCEP) has set the  following guidelines (reference values) for triglycerides:  Normal......................<150 mg/dL  Borderline High.............150-199 mg/dL  High........................200-499 mg/dL               TSH         0.724       Urobilinogen, UA   Negative             WBC, UA   1             WBC         6.62                              Significant Imaging:  I have reviewed all pertinent imaging results/findings within the past 24 hours.    EKG Performed at 858 p.m.  Rate 118 bpm  Rhythm:  Sinus tachycardia  QRS:  108 ms  QT:  366 ms  ST/T: no significant elevation or depression  Impression:  Sinus tachycardia with mild QT prolongation for rate  Comparison to previous:  QT increased from previous.  Rate increased from previous     Assessment/Plan:     Active Diagnoses:    Diagnosis Date Noted POA    PRINCIPAL PROBLEM:  Acute encephalopathy [G93.40] 07/09/2025 Yes    Hypokalemia [E87.6] 07/09/2025 Yes    Acute respiratory failure [J96.00] 07/09/2025 Yes    Hypertension [I10] 04/30/2025 Yes    Hypothyroidism [E03.9] 04/30/2025 Yes    Schizoaffective disorder [F25.9] 04/30/2025 Yes      Problems Resolved During this Admission:         Thank you for involving the Medical Toxicology Service in the care of this patient please feel free to contact us any time with any questions.   I will follow-up with patient. Please contact us if you have any additional questions.        Raul Cruz MD  Medical Toxicology  [facility]

## 2025-07-10 NOTE — EICU
Virtual ICU Quality Rounds    Admit Date: 7/9/2025  Hospital Day: 1    ICU Day: 16h    24H Vital Sign Range:  Temp:  [88 °F (31.1 °C)-99.9 °F (37.7 °C)]   Pulse:  []   Resp:  [7-25]   BP: (128-176)/(65-91)   SpO2:  [95 %-100 %]     VICU Surveillance Screening    Daily review of  line necessity(optional): Urinary catheter in place      Butt Indications : Critically ill in the intensive care unit requiring hourly urine output monitoring     LDA reconciliation : Yes      Butt best practices : Nurse driven butt removal protocol ordered, Prompt to consider removal if no urine output in past 24hrs or ESRD, Prompt alternatives(external catheters, in/ou cath, bladder scanning), Initiate bladder management algorithm for patients with urinary retention, Butt securement device in place with an intact seal visualized, and Sheet clips are used to prevent dependent loops, keep the bag below the bladder, and keep the bag off the ground    Skin assessment entered into the flowsheet

## 2025-07-10 NOTE — ED NOTES
I-STAT Chem-8+ Results:   Value Reference Range   Sodium 138 136-145 mmol/L   Potassium  2.9 3.5-5.1 mmol/L   Chloride 106  mmol/L   Ionized Calcium 1.08 1.06-1.42 mmol/L   CO2 (measured) 19 23-29 mmol/L   Glucose 162  mg/dL   BUN 12 6-30 mg/dL   Creatinine 0.8 0.5-1.4 mg/dL   Hematocrit 39 36-54%

## 2025-07-10 NOTE — ASSESSMENT & PLAN NOTE
Patient presented to [facility] ED following likely drug overdose with seroquel with GCS of 4, he was intubated in the ED for airway protection.     Supplemental oxygen was provided and noted-   Vent Mode: A/C  Oxygen Concentration (%):  [] 30  Resp Rate Total:  [0 br/min-23 br/min] 23 br/min  Vt Set:  [450 mL] 450 mL  PEEP/CPAP:  [5 cmH20] 5 cmH20  Mean Airway Pressure:  [0 cmH20-7.9 cmH20] 7.9 cmH20    Plan:  - Propofol & Fentanyl for sedation for RASS 0 to -1   - Follow up infectious workup   - Ween Vent as able for SpO2 >90%  - SAT/SBT Daily  - Vent Bundle in place  - Nutrition consented for tube feed recs

## 2025-07-10 NOTE — EICU
"Virtual ICU Admission    Admit Date: 2025  LOS: 1  Code Status: Full Code   : 1980  Bed: 7076/7076 A:     Diagnosis: Acute encephalopathy    Patient  has no past medical history on file.    Last VS: BP (!) 142/71   Pulse 98   Temp (!) 95 °F (35 °C)   Resp 19   Ht 5' 11" (1.803 m)   Wt 96 kg (211 lb 10.3 oz)   SpO2 96%   BMI 29.52 kg/m²       VICU Review    VICU nurse assessment :  [place] completed, LDA documentation reconciliation completed, Ventilator settings reviewed , Stress ulcer prophylaxis for ventilated patients review, and VTE prophylaxis review    No skin breakdown visualized    Nursing orders placed : IP CARMEN Peripheral IV Access          "

## 2025-07-10 NOTE — H&P
[facility] - Medical ICU  Critical Care Medicine  History & Physical    Patient Name: Ravi Ibanez  MRN: [id number]  Admission Date: 7/9/2025  Hospital Length of Stay: 1 days  Code Status: Full Code  Attending Physician: Joni Espino*   Primary Care Provider: Dk Diaz MD   Principal Problem: Acute encephalopathy    Subjective:     HPI:  Mr. Ravi Ibanez is a 43 y/o male with Schizoaffective disorder, hypothyroidism, hypertension and hyperlipidemia who presented to [facility] ED via EMS with concerns for AMS and suspected suicide attempt. History obtained from chart review. Patient father stated that several days ago patient mentioned feeling depressed and suicidal. His father went to check on him after not hearing from him all day and found him obtunded next to an empty bottle of Seroquel. Unclear of how much time passed between suspected overdose and when he was found. In the ED patient is obtunded and tachycardic with a GCS 4. EKG with Qtc prolongation. UA +Benzos and Cannabis, hypokalemic. Glucose, LFTs, TSH and B12 WNL. CT head without acute intracranial pathologies. 7.32/38.8 with a POC Lactate of 4.2. Patient is intubated in the ED for airway protection. Poison control was contacted given concerns for Seroquel ingestion, recommended supportive care and monitoring for anticholinergic poisoning. Toxicology consulted.     Critical Care consulted for management of respiratory failure in the setting of metabolic encephalopathy.          Hospital/ICU Course:  No notes on file     No past medical history on file.    No past surgical history on file.    Review of patient's allergies indicates:  No Known Allergies    Family History    None       Tobacco Use    Smoking status: Former     Types: Cigarettes, Vaping w/o nicotine, Vaping with nicotine    Smokeless tobacco: Current    Tobacco comments:     Patient states I vape occasionally    Substance and Sexual Activity    Alcohol use: Not Currently     Drug use: Yes     Types: Marijuana     Comment: Patient states I smoke every night and it is prescribed for anxiety    Sexual activity: Not on file      Review of Systems   Unable to perform ROS: Intubated     Objective:     Vital Signs (Most Recent):  Temp: 97.6 °F (36.4 °C) (07/09/25 2104)  Pulse: 98 (07/09/25 2143)  Resp: 18 (07/09/25 2143)  BP: (!) 148/83 (07/09/25 2143)  SpO2: 100 % (07/09/25 2143) Vital Signs (24h Range):  Temp:  [97.6 °F (36.4 °C)] 97.6 °F (36.4 °C)  Pulse:  [] 98  Resp:  [11-23] 18  SpO2:  [98 %-100 %] 100 %  BP: (143-176)/(83-91) 148/83   Weight: 96 kg (211 lb 10.3 oz)  Body mass index is 29.52 kg/m².    No intake or output data in the 24 hours ending 07/09/25 2159       Physical Exam  Constitutional:       General: He is not in acute distress.     Appearance: He is not ill-appearing.   HENT:      Head: Normocephalic.   Eyes:      General: No scleral icterus.     Extraocular Movements:      Right eye: No nystagmus.      Left eye: No nystagmus.      Pupils: Pupils are equal, round, and reactive to light.      Comments: Sluggish pupils bilateral   Cardiovascular:      Rate and Rhythm: Normal rate and regular rhythm.      Heart sounds: No murmur heard.  Pulmonary:      Effort: No respiratory distress.      Comments: Intubated, 30/5  Abdominal:      General: There is no distension.      Palpations: Abdomen is soft.   Musculoskeletal:      Right lower leg: No edema.      Left lower leg: No edema.   Skin:     General: Skin is warm.      Capillary Refill: Capillary refill takes less than 2 seconds.      Coloration: Skin is not jaundiced.      Findings: No bruising.   Neurological:      GCS: GCS eye subscore is 1. GCS verbal subscore is 1. GCS motor subscore is 1.      Comments: Sedated on propofol             Vents:  Vent Mode: A/C (07/09/25 2123)  Set Rate: 18 BPM (07/09/25 2123)  Vt Set: 450 mL (07/09/25 2123)  PEEP/CPAP: 5 cmH20 (07/09/25 2123)  Oxygen Concentration (%): 100 (07/09/25  2143)  Peak Airway Pressure: 21 cmH20 (07/09/25 2123)  Plateau Pressure: 0 cmH20 (07/09/25 2123)  Total Ve: 8.16 L/m (07/09/25 2123)  Negative Inspiratory Force (cm H2O): 0 (07/09/25 2123)  F/VT Ratio<105 (RSBI): (!) 51.57 (07/09/25 2123)  Lines/Drains/Airways       Drain  Duration                  NG/OG Tube 07/09/25 2130 orogastric 18 Fr. Center mouth <1 day         Urethral Catheter 07/09/25 2130 Temperature probe 16 Fr. <1 day              Airway  Duration                  Airway - Non-Surgical 07/09/25 2121 Endotracheal Tube <1 day              Peripheral Intravenous Line  Duration             Peripheral IV Single Lumen 07/09/25 2119 18 G Left Antecubital <1 day    Peripheral IV Single Lumen 07/09/25 2120 20 G Posterior;Right Hand <1 day    Peripheral IV Single Lumen 07/09/25 2138 20 G Right Antecubital <1 day                  Significant Labs:    CBC/Anemia Profile:  Recent Labs   Lab 07/09/25 2058 07/09/25 2105 07/09/25 2121   WBC 6.62  --   --    HGB 12.8*  --   --    HCT 38.3* 41.1 39     --   --    MCV 89  --   --    RDW 12.1  --   --         Chemistries:  Recent Labs   Lab 07/09/25 2058      K 2.9*      CO2 18*   BUN 12   CREATININE 0.8   CALCIUM 8.3*   ALBUMIN 4.0   PROT 6.3   BILITOT 0.6   ALKPHOS 40   ALT 23   AST 17   MG 1.7       All pertinent labs within the past 24 hours have been reviewed.    Significant Imaging: I have reviewed all pertinent imaging results/findings within the past 24 hours.  Assessment/Plan:     Neuro  * Acute encephalopathy  44yoM w/hx of schizoaffective disorder, HTN, HLD, hypothyroidism who presents to the hospital for AMS. Labs notable for K 2.9. VBG 7.323/39.8. CTH negative for acute intracranial abnormalities. UDS pending. Patient intubated in ED for airway protection. Concern for overdose 2/2 ingestion of prescribed Seroquel as empty prescription bottle was found at bedside per family. Poison control contacted. IV Magnesium administered in ED for  Qtc prolongation. Admitted to MICU for HLOC.     Plan:   - Intubated for airway protection. Daily SBT.   - Wean sedation as tolerated.   - Medical toxicology consulted, appreciate recs  - F/U UDS & Lamotrigine level  - Consider Psycyhiatry consult in the setting of suicidal attempt once patient is awake and extubated       Psychiatric  Schizoaffective disorder  Managed by psychiatry outpatient.    Home Regimen:  Clonazepam 1mg TID   Lamotrigine 200 QD  Risperidone 3mg QD  Quetiapine 300mg BID    Plan:  - Psychiatry consult once extubated   - Holding all psych meds in the setting of overdose       Pulmonary  On mechanically assisted ventilation  Patient presented to [facility] [place] ED following likely drug overdose with seroquel with GCS of 4, he was intubated in the ED for airway protection.     Supplemental oxygen was provided and noted-   Vent Mode: A/C  Oxygen Concentration (%):  [] 30  Resp Rate Total:  [0 br/min-23 br/min] 23 br/min  Vt Set:  [450 mL] 450 mL  PEEP/CPAP:  [5 cmH20] 5 cmH20  Mean Airway Pressure:  [0 cmH20-7.9 cmH20] 7.9 cmH20    Plan:  - Propofol & Fentanyl for sedation for RASS 0 to -1   - Follow up infectious workup   - Ween Vent as able for SpO2 >90%  - SAT/SBT Daily  - Vent Bundle in place  - Nutrition consented for tube feed recs       Cardiac/Vascular  Hyperlipidemia  Home regimen: Fenofibrate 160mg     Plan:  - Resume when appropriate   - Follow up TAGs    Hypertension  Home regimen: Lisinopril 20mg     Plan:   - Resume home regimen when appropriate       Renal/  Hypokalemia  Patient's most recent potassium results are listed below.   Recent Labs     07/09/25 2058   K 2.9*     Plan:  - Replete potassium per protocol      Endocrine  Hypothyroidism  Home regimen: Synthroid 75mg     Plan:  - Continue home Levothyroxine         Other  Hypothermia  Sequelae of probably seroquel overdose. TSH and T4 WNL, unlikely due to hypothyroidism.    Plan:  - Passive rewarming              Critical Care Daily  Checklist:    Antibiotics/Antivirals  None     Oxygen Requirements Ventilator   SAT & SBT Coordinated?  Yes   Diet NPO   Pt/Ot/SLP? [] Pt/Ot  [] SLP   Analgesia/Sedation Fent, Prop   Thromboembolic Prophylaxis Lovenox    HOB > 300 Yes   Stress Ulcer Prophylaxis  Not indicated   Glucose Control WNL   Bowel Function -   Indwelling Catheters (Lines & Webb)  Webb, PIV, ETT, OG   De-escalation of Antimicrobials/Pharmacotherapies No   Plan for the day SAT/SBT   Code Status: Full Code   Family Updates: Family updated in ED     I have discussed this patient's plan of care with Dr. Espino     Attestation to follow       Critical Care Time: 50 minutes  Critical secondary to Patient has a condition that poses threat to life and bodily function: Metabolic encephalopathy     Critical care was time spent personally by me on the following activities: development of treatment plan with patient or surrogate and bedside caregivers, discussions with consultants, evaluation of patient's response to treatment, examination of patient, ordering and performing treatments and interventions, ordering and review of laboratory studies, ordering and review of radiographic studies, pulse oximetry, re-evaluation of patient's condition. This critical care time did not overlap with that of any other provider or involve time for any procedures.     Claudia Mayer PA-C  Critical Care Medicine  [facility] - Medical ICU

## 2025-07-10 NOTE — ASSESSMENT & PLAN NOTE
Managed by psychiatry outpatient.    Home Regimen:  Clonazepam 1mg TID   Lamotrigine 200 QD  Risperidone 3mg QD  Quetiapine 300mg BID    Plan:  - Psychiatry consult once extubated   - Holding all psych meds in the setting of overdose

## 2025-07-10 NOTE — ASSESSMENT & PLAN NOTE
Sequelae of probably seroquel overdose. TSH and T4 WNL, unlikely due to hypothyroidism.    Plan:  - Passive rewarming

## 2025-07-10 NOTE — PLAN OF CARE
Recommendations  TF RECS: Initiate Isosource 1.5 @ trickle, slowly advancing to goal rate of 45 ml/hr to provide 1080 mL total volume, 1620 kcals, 73 g protein, 825 mL water, 108 %DRI - FWF per MD  533 kcals from propofol @ 20.2 mL  Once propofol discontinued: Isosource 1.5 @ 60 ml/hr to provide 1440 mL total volume, 2160 kcals, 98 g protein, 1100 mL water, 144% DRI - FWF per MD  Nursing, please document tube feeding rate via flowsheets  Monitor labs, meds, weight, skin    Goal #1: Provide greater than or equal to 75% nutritional needs via nutrition support by RD follow up  Nutrition Goal Status #1: new

## 2025-07-10 NOTE — HPI
Mr. Ravi Ibanez is a 43 y/o male with Schizoaffective disorder, hypothyroidism, hypertension and hyperlipidemia who presented to [facility] [place] ED via EMS with concerns for AMS and suspected suicide attempt. History obtained from chart review. Patient father stated that several days ago patient mentioned feeling depressed and suicidal. His father went to check on him after not hearing from him all day and found him obtunded next to an empty bottle of Seroquel. Unclear of how much time passed between suspected overdose and when he was found.   In the ED patient is obtunded and tachycardic with a GCS 4. EKG with Qtc prolongation. UA +Benzos and Cannabis, hypokalemic. Glucose, LFTs, TSH and B12 WNL. CT head without acute intracranial pathologies. 7.32/38.8 with a POC Lactate of 4.2. Patient is intubated in the ED for airway protection. Poison control was contacted given concerns for Seroquel ingestion, recommended supportive care and monitoring for anticholinergic poisoning. Toxicology consulted.     Critical Care consulted for management of respiratory failure in the setting of metabolic encephalopathy.

## 2025-07-10 NOTE — CARE UPDATE
Updated his father (Agnes) via telephone regarding patient's clinical status. All questions answered and concerns addressed.      Claudia Mayer PA-C  Critical Care Medicine  7/10/2025   1:05 AM

## 2025-07-10 NOTE — ASSESSMENT & PLAN NOTE
Patient's most recent potassium results are listed below.   Recent Labs     07/09/25 2058   K 2.9*     Plan:  - Replete potassium per protocol

## 2025-07-10 NOTE — ASSESSMENT & PLAN NOTE
44yoM w/hx of schizoaffective disorder, HTN, HLD, hypothyroidism who presents to the hospital for AMS. Labs notable for K 2.9. VBG 7.323/39.8. CTH negative for acute intracranial abnormalities. UDS pending. Patient intubated in ED for airway protection. Concern for overdose 2/2 ingestion of prescribed Seroquel as empty prescription bottle was found at bedside per family. Poison control contacted. IV Magnesium administered in ED for Qtc prolongation. Admitted to MICU for HLOC.     Plan:   - Intubated for airway protection. Daily SBT.   - Wean sedation as tolerated.   - Medical toxicology consulted, appreciate recs  - F/U UDS & Lamotrigine level  - Consider Psycyhiatry consult in the setting of suicidal attempt once patient is awake and extubated

## 2025-07-10 NOTE — SUBJECTIVE & OBJECTIVE
No past medical history on file.    No past surgical history on file.    Review of patient's allergies indicates:  No Known Allergies    Family History    None       Tobacco Use    Smoking status: Former     Types: Cigarettes, Vaping w/o nicotine, Vaping with nicotine    Smokeless tobacco: Current    Tobacco comments:     Patient states I vape occasionally    Substance and Sexual Activity    Alcohol use: Not Currently    Drug use: Yes     Types: Marijuana     Comment: Patient states I smoke every night and it is prescribed for anxiety    Sexual activity: Not on file      Review of Systems   Unable to perform ROS: Intubated     Objective:     Vital Signs (Most Recent):  Temp: 97.6 °F (36.4 °C) (07/09/25 2104)  Pulse: 98 (07/09/25 2143)  Resp: 18 (07/09/25 2143)  BP: (!) 148/83 (07/09/25 2143)  SpO2: 100 % (07/09/25 2143) Vital Signs (24h Range):  Temp:  [97.6 °F (36.4 °C)] 97.6 °F (36.4 °C)  Pulse:  [] 98  Resp:  [11-23] 18  SpO2:  [98 %-100 %] 100 %  BP: (143-176)/(83-91) 148/83   Weight: 96 kg (211 lb 10.3 oz)  Body mass index is 29.52 kg/m².    No intake or output data in the 24 hours ending 07/09/25 2159       Physical Exam  Constitutional:       General: He is not in acute distress.     Appearance: He is not ill-appearing.   HENT:      Head: Normocephalic.   Eyes:      General: No scleral icterus.     Extraocular Movements:      Right eye: No nystagmus.      Left eye: No nystagmus.      Pupils: Pupils are equal, round, and reactive to light.      Comments: Sluggish pupils bilateral   Cardiovascular:      Rate and Rhythm: Normal rate and regular rhythm.      Heart sounds: No murmur heard.  Pulmonary:      Effort: No respiratory distress.      Comments: Intubated, 30/5  Abdominal:      General: There is no distension.      Palpations: Abdomen is soft.   Musculoskeletal:      Right lower leg: No edema.      Left lower leg: No edema.   Skin:     General: Skin is warm.      Capillary Refill: Capillary refill  takes less than 2 seconds.      Coloration: Skin is not jaundiced.      Findings: No bruising.   Neurological:      GCS: GCS eye subscore is 1. GCS verbal subscore is 1. GCS motor subscore is 1.      Comments: Sedated on propofol             Vents:  Vent Mode: A/C (07/09/25 2123)  Set Rate: 18 BPM (07/09/25 2123)  Vt Set: 450 mL (07/09/25 2123)  PEEP/CPAP: 5 cmH20 (07/09/25 2123)  Oxygen Concentration (%): 100 (07/09/25 2143)  Peak Airway Pressure: 21 cmH20 (07/09/25 2123)  Plateau Pressure: 0 cmH20 (07/09/25 2123)  Total Ve: 8.16 L/m (07/09/25 2123)  Negative Inspiratory Force (cm H2O): 0 (07/09/25 2123)  F/VT Ratio<105 (RSBI): (!) 51.57 (07/09/25 2123)  Lines/Drains/Airways       Drain  Duration                  NG/OG Tube 07/09/25 2130 orogastric 18 Fr. Center mouth <1 day         Urethral Catheter 07/09/25 2130 Temperature probe 16 Fr. <1 day              Airway  Duration                  Airway - Non-Surgical 07/09/25 2121 Endotracheal Tube <1 day              Peripheral Intravenous Line  Duration             Peripheral IV Single Lumen 07/09/25 2119 18 G Left Antecubital <1 day    Peripheral IV Single Lumen 07/09/25 2120 20 G Posterior;Right Hand <1 day    Peripheral IV Single Lumen 07/09/25 2138 20 G Right Antecubital <1 day                  Significant Labs:    CBC/Anemia Profile:  Recent Labs   Lab 07/09/25 2058 07/09/25 2105 07/09/25 2121   WBC 6.62  --   --    HGB 12.8*  --   --    HCT 38.3* 41.1 39     --   --    MCV 89  --   --    RDW 12.1  --   --         Chemistries:  Recent Labs   Lab 07/09/25 2058      K 2.9*      CO2 18*   BUN 12   CREATININE 0.8   CALCIUM 8.3*   ALBUMIN 4.0   PROT 6.3   BILITOT 0.6   ALKPHOS 40   ALT 23   AST 17   MG 1.7       All pertinent labs within the past 24 hours have been reviewed.    Significant Imaging: I have reviewed all pertinent imaging results/findings within the past 24 hours.

## 2025-07-10 NOTE — PROGRESS NOTES
[facility]  Progress Note  07/10/2025  9:42 AM    #Quetiapine (seroquel) ingestion  -patient seen bedside this AM, remains intubated; off sedation and opening eyes to name but not following commands  -Intermittent jerking of the extremities noted, with roving bilateral eye movements.   -Labs remain stable  -EKG: rate 115, QTC remains prolonged at 572  -Quetiapine: functions as an alpha blocker that causes hypotension, it is a strong anticholinergic that will cause AMS, urinary retention, tachycardia, seizures, dry MM; notable for QTC prolonging effects  -Most likely quetiapine ingestion, there are potentially other medications on board however further testing is limited. Quetiapine is a typically self limited toxicity and mainstay of treatment is good supportive care.    Recommendations:     -Remains toxic from quetiapine, with a strong component of anticholinergic toxicity currently; seizures are possible   -Expect symptoms may last several more days   -IVF and pressors as needed for hypotension   -Wean vent as tolerated   -Avoid any QTC prolonging agents   -Follow lamotrigine level   -Daily EKGs to follow QTC   -Replace electrolytes and mag    Will follow.    Overnight events and hospital course:   Remains intubated    REVIEW OF SYSTEMS:    All systems reviewed and negative except as noted in HPI    PHYSICAL EXAM:    General: sedated  Head: Normocephalic, atraumatic.  HEENT: significant b/l nystagmus noted  Neck: Supple.  Cardiovascular: Tachycardic   Pulm/Chest: Lungs clear to auscultation; on vent  Abdomen: Nontender. Nondistended.   Neuro:   -awakens to voice, does not follow commands  -intermittent jerking of UE and LE    Skin: Warm, dry, and intact.  Psych: remains fairly obtunded    Results for orders placed or performed during the hospital encounter of 07/09/25   EKG 12-lead   Result Value Ref Range    QRS Duration 112 ms    [facility] QTC Calculation 535 ms    Narrative    Test Reason :  R41.82,    Vent. Rate :  95 BPM     Atrial Rate :  95 BPM     P-R Int : 178 ms          QRS Dur : 112 ms      QT Int : 426 ms       P-R-T Axes :  42  93  53 degrees    QTcB Int : 535 ms    Normal sinus rhythm  Rightward axis  Prolonged QT  Abnormal ECG  When compared with ECG of 09-Jul-2025 20:58,  No significant change was found  Confirmed by Ashish Tay ([id number]) on 7/10/2025 7:33:47 AM    Referred By:            Confirmed By: Ashish Tay        RELEVANT LABS / IMAGING    - reviewed          ASSESSMENT:     44 y.o. male progressing as expected with a presumed quetiapine ingestion    RECOMMENDATIONS:    SEE ABOVE      I spent 31 minutes in review of the case and relevant documentation, lab work and imaging.  I discussed the case with the nurse and the primary team.    Please contact on-call medical  with questions.    Raul Paulino MD  Medical Toxicology Attending    Please note: this is a Medical Toxicology Consultation note, not an Emergency Department note.

## 2025-07-10 NOTE — CONSULTS
"  [facility] - Medical ICU  Adult Nutrition  Consult Note    SUMMARY     Recommendations  TF RECS: Initiate Isosource 1.5 @ trickle, slowly advancing to goal rate of 45 ml/hr to provide 1080 mL total volume, 1620 kcals, 73 g protein, 825 mL water, 108 %DRI - [facility] per MD  533 kcals from propofol @ 20.2 mL  Once propofol discontinued: Isosource 1.5 @ 60 ml/hr to provide 1440 mL total volume, 2160 kcals, 98 g protein, 1100 mL water, 144% DRI - [facility] per MD  Nursing, please document tube feeding rate via flowsheets  Monitor labs, meds, weight, skin    Goal #1: Provide greater than or equal to 75% nutritional needs via nutrition support by RD follow up  Nutrition Goal Status #1: new    Nutrition Discharge Planning     Nutrition Discharge Planning: Too early to determine, pending clinical course    Reason for Assessment    Reason For Assessment: consult  Diagnosis: other (see comments) (Overdose of antipsychotic)  General Information Comments: 45 y/o male with Schizoaffective disorder, hypothyroidism, hypertension and hyperlipidemia who presented to [facility] ED via EMS with concerns for AMS and suspected suicide attempt. RD consulted for tube feeding recs. Intubated/ sedated. NG/OG in place. No wounds present. No recent weight loss noted. RD to f/u and monitor.    Nutrition/Diet History    Spiritual, Cultural Beliefs, Nondenominational Practices, Values that Affect Care: no  Food Allergies: NKFA  Nutrition-related SDOH: Unable to assess at this time    Anthropometrics    Height: 5' 11" (180.3 cm)  Height (inches): 71 in  Height Method: Estimated  Weight: 96 kg (211 lb 10.3 oz)  Weight (lb): 211.64 lb  Weight Method: Estimated  Ideal Body Weight (IBW), Male: 172 lb  % Ideal Body Weight, Male (lb): 123.05 %  BMI (Calculated): 29.5  BMI Grade: 25 - 29.9 - overweight       Lab/Procedures/Meds    Pertinent Labs Reviewed: reviewed  Pertinent Labs Comments: Potassium 3.4 (L), Phos 2.3 (L)  Pertinent Medications Reviewed: reviewed  Pertinent " Medications Comments: Enoxaparin, levothyroxine, precedex, fentanyl, propofol    Estimated/Assessed Needs    Weight Used For Calorie Calculations: 96 kg (211 lb 10.3 oz)  Energy Calorie Requirements (kcal): 2170  Energy Need Method: [facility]  Protein Requirements:  (1-1.5 g/kg)  Weight Used For Protein Calculations: 96 kg (211 lb 10.3 oz)     Estimated Fluid Requirement Method: RDA Method  RDA Method (mL): 2170         Nutrition Prescription Ordered    Current Diet Order: NPO    Evaluation of Received Nutrient/Fluid Intake    Lipid Calories (kcals): 533 kcals (from propofol @ 20.2 mL)  I/O: +1 L since admission.  Comments: LBM RENAY  % Intake of Estimated Energy Needs: 0 - 25 %  % Meal Intake: NPO    PES Statement  Inadequate protein energy intake related to Inadequate enteral nutrition infusion as evidenced by Intake <25% estimated needs, NPO/CL status due to medical condition  Status: New    Nutrition Risk    Level of Risk/Frequency of Follow-up: moderate - high (f/u 1-2x/week)       Monitor and Evaluation    Monitor and Evaluation: Enteral and parenteral nutrition administration, Weight, Electrolyte and renal panel, Gastrointestinal profile, Glucose/endocrine profile, Inflammatory profile, Lipid profile       Nutrition Follow-Up    RD Follow-up?: Yes

## 2025-07-10 NOTE — CONSULTS
Consult received, patient to be admitted to MICU. Full H&P to follow.    Penny Francisco MD  Internal Medicine, PGY-3  07/09/2025 10:32 PM

## 2025-07-10 NOTE — RESPIRATORY THERAPY
Md at beside. Pt intubated with 7.5ETT secured 25cm @teeth. Positive color change.Positive breath sounds. Pt placed on Pb980 with settings in flowsheet.

## 2025-07-10 NOTE — PLAN OF CARE
CM attempted to complete initial  assessment, unable to perform secondary to patient intubated  will attempt at later time.     Rayna Willis RN  Case Management  [phone]

## 2025-07-11 PROBLEM — G93.41 ACUTE METABOLIC ENCEPHALOPATHY: Status: ACTIVE | Noted: 2025-07-09

## 2025-07-11 LAB
ABSOLUTE EOSINOPHIL (OHS): 0.14 K/UL
ABSOLUTE MONOCYTE (OHS): 0.4 K/UL (ref 0.3–1)
ABSOLUTE NEUTROPHIL COUNT (OHS): 6.89 K/UL (ref 1.8–7.7)
ALBUMIN SERPL BCP-MCNC: 3.5 G/DL (ref 3.5–5.2)
ALP SERPL-CCNC: 47 UNIT/L (ref 40–150)
ALT SERPL W/O P-5'-P-CCNC: 19 UNIT/L (ref 10–44)
ANION GAP (OHS): 11 MMOL/L (ref 8–16)
ANION GAP (OHS): 6 MMOL/L (ref 8–16)
ANION GAP (OHS): 7 MMOL/L (ref 8–16)
ANION GAP (OHS): 7 MMOL/L (ref 8–16)
ANION GAP (OHS): 8 MMOL/L (ref 8–16)
AST SERPL-CCNC: 18 UNIT/L (ref 11–45)
B PERT DNA NPH QL NAA+PROBE: NOT DETECTED
BACTERIA #/AREA URNS AUTO: ABNORMAL /HPF
BASOPHILS # BLD AUTO: 0.03 K/UL
BASOPHILS NFR BLD AUTO: 0.3 %
BILIRUB SERPL-MCNC: 1 MG/DL (ref 0.1–1)
BILIRUB UR QL STRIP.AUTO: NEGATIVE
BUN SERPL-MCNC: 10 MG/DL (ref 6–20)
BUN SERPL-MCNC: 11 MG/DL (ref 6–20)
BUN SERPL-MCNC: 14 MG/DL (ref 6–20)
BUN SERPL-MCNC: 9 MG/DL (ref 6–20)
BUN SERPL-MCNC: 9 MG/DL (ref 6–20)
C PNEUM DNA LOWER RESP QL NAA+NON-PROBE: NOT DETECTED
CALCIUM SERPL-MCNC: 8.1 MG/DL (ref 8.7–10.5)
CALCIUM SERPL-MCNC: 8.2 MG/DL (ref 8.7–10.5)
CALCIUM SERPL-MCNC: 8.6 MG/DL (ref 8.7–10.5)
CALCIUM SERPL-MCNC: 8.6 MG/DL (ref 8.7–10.5)
CALCIUM SERPL-MCNC: 8.9 MG/DL (ref 8.7–10.5)
CHLORIDE SERPL-SCNC: 104 MMOL/L (ref 95–110)
CHLORIDE SERPL-SCNC: 105 MMOL/L (ref 95–110)
CHLORIDE SERPL-SCNC: 106 MMOL/L (ref 95–110)
CHLORIDE SERPL-SCNC: 107 MMOL/L (ref 95–110)
CHLORIDE SERPL-SCNC: 107 MMOL/L (ref 95–110)
CK SERPL-CCNC: 72 U/L (ref 20–200)
CLARITY UR: ABNORMAL
CO2 SERPL-SCNC: 18 MMOL/L (ref 23–29)
CO2 SERPL-SCNC: 21 MMOL/L (ref 23–29)
CO2 SERPL-SCNC: 21 MMOL/L (ref 23–29)
CO2 SERPL-SCNC: 22 MMOL/L (ref 23–29)
CO2 SERPL-SCNC: 24 MMOL/L (ref 23–29)
COLOR UR AUTO: YELLOW
CREAT SERPL-MCNC: 0.9 MG/DL (ref 0.5–1.4)
CREAT SERPL-MCNC: 1 MG/DL (ref 0.5–1.4)
CREAT SERPL-MCNC: 1 MG/DL (ref 0.5–1.4)
ERYTHROCYTE [DISTWIDTH] IN BLOOD BY AUTOMATED COUNT: 12.8 % (ref 11.5–14.5)
FLUAV RNA NPH QL NAA+NON-PROBE: NOT DETECTED
FLUBV RNA NPH QL NAA+NON-PROBE: NOT DETECTED
GFR SERPLBLD CREATININE-BSD FMLA CKD-EPI: >60 ML/MIN/1.73/M2
GLUCOSE SERPL-MCNC: 116 MG/DL (ref 70–110)
GLUCOSE SERPL-MCNC: 140 MG/DL (ref 70–110)
GLUCOSE SERPL-MCNC: 91 MG/DL (ref 70–110)
GLUCOSE SERPL-MCNC: 92 MG/DL (ref 70–110)
GLUCOSE SERPL-MCNC: 92 MG/DL (ref 70–110)
GLUCOSE UR QL STRIP: ABNORMAL
HADV DNA NPH QL NAA+NON-PROBE: NOT DETECTED
HCOV 229E RNA NPH QL NAA+NON-PROBE: NOT DETECTED
HCOV HKU1 RNA NPH QL NAA+NON-PROBE: NOT DETECTED
HCOV NL63 RNA NPH QL NAA+NON-PROBE: NOT DETECTED
HCOV OC43 RNA NPH QL NAA+NON-PROBE: NOT DETECTED
HCT VFR BLD AUTO: 37.6 % (ref 40–54)
HGB BLD-MCNC: 12.7 GM/DL (ref 14–18)
HGB UR QL STRIP: ABNORMAL
HMPV RNA LOWER RESP QL NAA+NON-PROBE: NOT DETECTED
HMPV RNA NPH QL NAA+NON-PROBE: NOT DETECTED
HPIV1 RNA NPH QL NAA+NON-PROBE: NOT DETECTED
HPIV2 RNA NPH QL NAA+NON-PROBE: NOT DETECTED
HPIV3 RNA NPH QL NAA+NON-PROBE: NOT DETECTED
HPIV4 RNA NPH QL NAA+NON-PROBE: NOT DETECTED
HYALINE CASTS UR QL AUTO: 0 /LPF (ref 0–1)
IMM GRANULOCYTES # BLD AUTO: 0.04 K/UL (ref 0–0.04)
IMM GRANULOCYTES NFR BLD AUTO: 0.5 % (ref 0–0.5)
KETONES UR QL STRIP: NEGATIVE
LEUKOCYTE ESTERASE UR QL STRIP: ABNORMAL
LYMPHOCYTES # BLD AUTO: 1.29 K/UL (ref 1–4.8)
MAGNESIUM SERPL-MCNC: 2.9 MG/DL (ref 1.6–2.6)
MCH RBC QN AUTO: 30.8 PG (ref 27–31)
MCHC RBC AUTO-ENTMCNC: 33.8 G/DL (ref 32–36)
MCV RBC AUTO: 91 FL (ref 82–98)
MICROSCOPIC COMMENT: ABNORMAL
NITRITE UR QL STRIP: NEGATIVE
NUCLEATED RBC (/100WBC) (OHS): 0 /100 WBC
PH UR STRIP: 6 [PH]
PHOSPHATE SERPL-MCNC: 2.3 MG/DL (ref 2.7–4.5)
PLATELET # BLD AUTO: 185 K/UL (ref 150–450)
PMV BLD AUTO: 10 FL (ref 9.2–12.9)
POTASSIUM SERPL-SCNC: 4.3 MMOL/L (ref 3.5–5.1)
POTASSIUM SERPL-SCNC: 4.4 MMOL/L (ref 3.5–5.1)
POTASSIUM SERPL-SCNC: 4.5 MMOL/L (ref 3.5–5.1)
POTASSIUM SERPL-SCNC: 4.6 MMOL/L (ref 3.5–5.1)
POTASSIUM SERPL-SCNC: 4.6 MMOL/L (ref 3.5–5.1)
PROT SERPL-MCNC: 6.1 GM/DL (ref 6–8.4)
PROT UR QL STRIP: ABNORMAL
RBC # BLD AUTO: 4.12 M/UL (ref 4.6–6.2)
RBC #/AREA URNS AUTO: 7 /HPF (ref 0–4)
RELATIVE EOSINOPHIL (OHS): 1.6 %
RELATIVE LYMPHOCYTE (OHS): 14.7 % (ref 18–48)
RELATIVE MONOCYTE (OHS): 4.6 % (ref 4–15)
RELATIVE NEUTROPHIL (OHS): 78.3 % (ref 38–73)
RSV RNA NPH QL NAA+NON-PROBE: NOT DETECTED
RSV RNA NPH QL NAA+NON-PROBE: NOT DETECTED
RV+EV RNA NPH QL NAA+NON-PROBE: NOT DETECTED
SARS-COV-2 RNA RESP QL NAA+PROBE: NOT DETECTED
SODIUM SERPL-SCNC: 133 MMOL/L (ref 136–145)
SODIUM SERPL-SCNC: 135 MMOL/L (ref 136–145)
SODIUM SERPL-SCNC: 136 MMOL/L (ref 136–145)
SP GR UR STRIP: 1.02
SPECIMEN SOURCE: NORMAL
UROBILINOGEN UR STRIP-ACNC: NEGATIVE EU/DL
WBC # BLD AUTO: 8.79 K/UL (ref 3.9–12.7)
WBC #/AREA URNS AUTO: 3 /HPF (ref 0–5)
YEAST UR QL AUTO: ABNORMAL /HPF

## 2025-07-11 PROCEDURE — 63600175 PHARM REV CODE 636 W HCPCS: Mod: HCNC

## 2025-07-11 PROCEDURE — 99291 CRITICAL CARE FIRST HOUR: CPT | Mod: HCNC,,, | Performed by: INTERNAL MEDICINE

## 2025-07-11 PROCEDURE — 99900026 HC AIRWAY MAINTENANCE (STAT): Mod: HCNC

## 2025-07-11 PROCEDURE — 94003 VENT MGMT INPAT SUBQ DAY: CPT | Mod: HCNC

## 2025-07-11 PROCEDURE — 84100 ASSAY OF PHOSPHORUS: CPT | Mod: HCNC

## 2025-07-11 PROCEDURE — 83735 ASSAY OF MAGNESIUM: CPT | Mod: HCNC

## 2025-07-11 PROCEDURE — 63600175 PHARM REV CODE 636 W HCPCS: Mod: HCNC | Performed by: INTERNAL MEDICINE

## 2025-07-11 PROCEDURE — 99499 UNLISTED E&M SERVICE: CPT | Mod: HCNC,,, | Performed by: PHYSICIAN ASSISTANT

## 2025-07-11 PROCEDURE — 82550 ASSAY OF CK (CPK): CPT | Mod: HCNC

## 2025-07-11 PROCEDURE — 95720 EEG PHY/QHP EA INCR W/VEEG: CPT | Mod: HCNC,,, | Performed by: PSYCHIATRY & NEUROLOGY

## 2025-07-11 PROCEDURE — 82310 ASSAY OF CALCIUM: CPT | Mod: HCNC

## 2025-07-11 PROCEDURE — 81003 URINALYSIS AUTO W/O SCOPE: CPT | Mod: HCNC

## 2025-07-11 PROCEDURE — 94761 N-INVAS EAR/PLS OXIMETRY MLT: CPT | Mod: HCNC

## 2025-07-11 PROCEDURE — 20000000 HC ICU ROOM: Mod: HCNC

## 2025-07-11 PROCEDURE — 95714 VEEG EA 12-26 HR UNMNTR: CPT | Mod: HCNC

## 2025-07-11 PROCEDURE — 25000003 PHARM REV CODE 250: Mod: HCNC

## 2025-07-11 PROCEDURE — 99223 1ST HOSP IP/OBS HIGH 75: CPT | Mod: FS,HCNC,, | Performed by: PSYCHIATRY & NEUROLOGY

## 2025-07-11 PROCEDURE — 99231 SBSQ HOSP IP/OBS SF/LOW 25: CPT | Mod: HCNC,,, | Performed by: STUDENT IN AN ORGANIZED HEALTH CARE EDUCATION/TRAINING PROGRAM

## 2025-07-11 PROCEDURE — 80048 BASIC METABOLIC PNL TOTAL CA: CPT | Mod: HCNC

## 2025-07-11 PROCEDURE — 85025 COMPLETE CBC W/AUTO DIFF WBC: CPT | Mod: HCNC

## 2025-07-11 PROCEDURE — 99900035 HC TECH TIME PER 15 MIN (STAT): Mod: HCNC

## 2025-07-11 PROCEDURE — 27100171 HC OXYGEN HIGH FLOW UP TO 24 HOURS: Mod: HCNC

## 2025-07-11 PROCEDURE — 25000003 PHARM REV CODE 250: Mod: HCNC | Performed by: INTERNAL MEDICINE

## 2025-07-11 PROCEDURE — 0202U NFCT DS 22 TRGT SARS-COV-2: CPT | Mod: HCNC | Performed by: INTERNAL MEDICINE

## 2025-07-11 PROCEDURE — 87040 BLOOD CULTURE FOR BACTERIA: CPT | Mod: HCNC

## 2025-07-11 RX ORDER — ACETAMINOPHEN 650 MG/1
650 SUPPOSITORY RECTAL EVERY 6 HOURS PRN
Status: DISCONTINUED | OUTPATIENT
Start: 2025-07-11 | End: 2025-07-15 | Stop reason: HOSPADM

## 2025-07-11 RX ORDER — METOPROLOL TARTRATE 1 MG/ML
5 INJECTION, SOLUTION INTRAVENOUS EVERY 5 MIN PRN
Status: DISCONTINUED | OUTPATIENT
Start: 2025-07-11 | End: 2025-07-15 | Stop reason: HOSPADM

## 2025-07-11 RX ORDER — DEXMEDETOMIDINE HYDROCHLORIDE 4 UG/ML
0-1.4 INJECTION, SOLUTION INTRAVENOUS CONTINUOUS
Status: DISCONTINUED | OUTPATIENT
Start: 2025-07-11 | End: 2025-07-12

## 2025-07-11 RX ORDER — VANCOMYCIN 1.75 GRAM/500 ML IN 0.9 % SODIUM CHLORIDE INTRAVENOUS
1750
Status: DISCONTINUED | OUTPATIENT
Start: 2025-07-12 | End: 2025-07-13

## 2025-07-11 RX ORDER — DIAZEPAM 10 MG/2ML
5 INJECTION INTRAMUSCULAR EVERY 8 HOURS
Status: DISCONTINUED | OUTPATIENT
Start: 2025-07-11 | End: 2025-07-14

## 2025-07-11 RX ADMIN — DEXMEDETOMIDINE HYDROCHLORIDE 0.7 MCG/KG/HR: 4 INJECTION INTRAVENOUS at 11:07

## 2025-07-11 RX ADMIN — Medication 100 MCG/HR: at 08:07

## 2025-07-11 RX ADMIN — VANCOMYCIN HYDROCHLORIDE 2250 MG: 1.25 INJECTION, POWDER, LYOPHILIZED, FOR SOLUTION INTRAVENOUS at 08:07

## 2025-07-11 RX ADMIN — METOROPROLOL TARTRATE 5 MG: 5 INJECTION, SOLUTION INTRAVENOUS at 05:07

## 2025-07-11 RX ADMIN — ACETAMINOPHEN 650 MG: 650 SUPPOSITORY RECTAL at 06:07

## 2025-07-11 RX ADMIN — LEVOTHYROXINE SODIUM 75 MCG: 75 TABLET ORAL at 05:07

## 2025-07-11 RX ADMIN — PROPOFOL 20 MCG/KG/MIN: 10 INJECTION, EMULSION INTRAVENOUS at 02:07

## 2025-07-11 RX ADMIN — PROPOFOL 25 MCG/KG/MIN: 10 INJECTION, EMULSION INTRAVENOUS at 06:07

## 2025-07-11 RX ADMIN — DEXMEDETOMIDINE HYDROCHLORIDE 0.4 MCG/KG/HR: 4 INJECTION INTRAVENOUS at 05:07

## 2025-07-11 RX ADMIN — DIAZEPAM 5 MG: 10 INJECTION, SOLUTION INTRAMUSCULAR; INTRAVENOUS at 05:07

## 2025-07-11 RX ADMIN — PIPERACILLIN SODIUM AND TAZOBACTAM SODIUM 4.5 G: 4; .5 INJECTION, POWDER, FOR SOLUTION INTRAVENOUS at 05:07

## 2025-07-11 RX ADMIN — DIAZEPAM 5 MG: 10 INJECTION, SOLUTION INTRAMUSCULAR; INTRAVENOUS at 11:07

## 2025-07-11 RX ADMIN — ENOXAPARIN SODIUM 40 MG: 40 INJECTION SUBCUTANEOUS at 05:07

## 2025-07-11 RX ADMIN — MAGNESIUM SULFATE HEPTAHYDRATE 2 G: 40 INJECTION, SOLUTION INTRAVENOUS at 12:07

## 2025-07-11 NOTE — HOSPITAL COURSE
07/11/25: No SZ activity noted on EEG. Passes SAT/SBT --> extubated.  07/12/25: Fever late evening, work-up and abx initiated   07/13/25:continued fevers, mental status much improved. IVF, Klonipin restarted.

## 2025-07-11 NOTE — PROCEDURES
EEG REPORT      Ravi Ibanez  [id number]  1980    DATE OF SERVICE: 7/10/2025     -1    METHODOLOGY      Extended electroencephalographic recording is made while the patient is ambulatory and continuing normal daily activities.  Electrodes are placed according to the International 10-20 placement system and included T1 and T2 electrode placement.  Twenty four (24) channels of digital signal (sampling rate of 512/sec) was simultaneously recorded from the scalp including EKG and eye monitors.  Recording band pass was 0.1 to 100 hz and all data was stored digitally on the recorder.  The patient is instructed to press an event button when clinical symptoms occur and write the symptoms into a diary. Activation procedures which include photic stimulation, hyperventilation and instructing patients to perform simple task are done in selected patients.        The EEG is displayed on a monitor screen and can be reformatted into different montages for evaluation.  The entire recoding is submitted for computer assisted analysis to detect spike and electrographic seizure activity.  The entire recording is visually reviewed and the times identified by computer analysis as being spikes or seizures are reviewed again.  Compresses spectral analysis (CSA) is also performed on the activity recorded from each individual channel.  This is displayed as a power display of frequencies from 0 to 30 Hz over time. The CSA analysis is done and displayed continuously.  This is reviewed for asymmetries in power between homologous areas of the scalp and for presence of changes in power which canbe seen when seizures occur.  Sections of suspected abnormalities on the CSA is then compared with the original EEG recording.  .     [employer] software was also utilized in the review of this study.  This software suite analyzes the EEG recording in multiple domains.  Coherence and rhythmicity is computed to identify EEG sections which may  contain organized seizures.  Each channel undergoes analysis to detect presence of spike and sharp waves which have special and morphological characteristic of epileptic activity.  The routine EEG recording is converted from spacial into frequency domain.  This is then displayed comparing homologous areas to identify areas of significant asymmetry.  Algorithm to identify non-cortically generated artifact is used to separate eye movement, EMG and other artifact from the EEG     Recording Times  Start on 7/10/2025  Stop on 7/11/2025    A total of 21:17:11 hours of EEG was recorded.      EEG FINDINGS:  Background activity:   Patient maintained on propofol during the recording.    The background rhythm was characterized by poorly organized alpha and theta activity with no posterior dominant alpha rhythm noted.   Symmetry and continuity: the background was continuous and symmetric     Sleep:   Normal sleep transients including sleep spindles, K complexes, vertex waves although sleep was poorly staged.    Activation procedures:   NA    Abnormal activity:   Rare generalized spike and wave discharges lasting less than a second.    IMPRESSION:   Abnormal EEG due to findings consistent with generalized cortical irritability.    This is often seen in the setting of an idiopathic generalized epilepsy but can also be seen in the setting of toxicity of some medications.  Would consider further recording based on clinical suspicion.    There is a preponderance of poorly staged sleep likely indicative of a mild generalized cerebral dysfunction.  There are no electrographic seizures.      Cabrera Luther MD  Neurology-Epilepsy.  [facility].

## 2025-07-11 NOTE — EICU
Virtual ICU Quality Rounds    Admit Date: 7/9/2025  Hospital Day: 2    ICU Day: 1d 10h    24H Vital Sign Range:  Temp:  [97 °F (36.1 °C)-99.7 °F (37.6 °C)]   Pulse:  []   Resp:  [8-22]   BP: (117-161)/(67-82)   SpO2:  [95 %-100 %]     VICU Surveillance Screening  Daily review of  line necessity(optional): Urinary catheter in place  Butt Indications : Critically ill in the intensive care unit requiring hourly urine output monitoring   LDA reconciliation : Yes  Butt best practices : Nurse driven butt removal protocol ordered

## 2025-07-11 NOTE — PLAN OF CARE
MICU DAILY GOALS     Family/Goals of care/Code Status   Code Status: Full Code    24H Vital Sign Range  Temp:  [98.2 °F (36.8 °C)-99.9 °F (37.7 °C)]   Pulse:  []   Resp:  [12-22]   BP: (119-161)/(65-82)   SpO2:  [95 %-100 %]      Shift Events (include procedures and significant events)   No acute events throughout shift    AWAKE RASS: Goal -    Actual - RASS (Marquez Agitation-Sedation Scale): light sedation    Restraint necessity: Removing medical devices   BREATHE SBT: Not attempted    Coordinate A & B, analgesics/sedatives Pain: managed   SAT: Not attempted   Delirium CAM-ICU:     Early(intubated/ Progressive (non-intubated) Mobility MOVE Screen (INTUBATED ONLY): Not attempted    Activity: Activity Management: Rolling - L1   Feeding/Nutrition Diet order: Diet/Nutrition Received: NPO,     Thrombus DVT prophylaxis: VTE Core Measure: Pharmacological prophylaxis initiated/maintained   HOB Elevation Head of Bed (HOB) Positioning: HOB elevated   Ulcer Prophylaxis GI: yes   Glucose control managed     Skin Skin assessment:     Sacrum intact/not altered? Yes  Heels intact/not altered? Yes  Surgical wound? No    CHECK ONE!   (no altered skin or altered skin) and sub boxes:  [x] No Altered Skin Integrity Present    []Prevention Measures Documented    [] Altered Skin Integrity Present or Discovered   [] LDA already present in EPIC, daily doc completed              [] LDA added if not already in EPIC (describe/stage wound).               [] Wound Image Taken (required on admit,                   transfer/discharge and every Tuesday)    Wound Care Consulted? No   Bowel Function no issues    Indwelling Catheter Necessity      Urethral Catheter 07/09/25 2130 Temperature probe 16 Fr.-Reason for Continuing Urinary Catheterization: Critically ill in ICU and requiring hourly monitoring of intake/output        De-escalation Antibiotics Yes        VS and assessment per flow sheet, patient progressing towards goals as  tolerated, plan of care reviewed with family, all concerns addressed, will continue to monitor.

## 2025-07-11 NOTE — ASSESSMENT & PLAN NOTE
VBG 7.323/39.8.   CTH negative for acute intracranial abnormalities.   UDS positive for benzo/THC.   Patient intubated in ED for airway protection. Concern for overdose 2/2 ingestion of prescribed Seroquel as empty prescription bottle was found at bedside per family. Poison control contacted. IV Magnesium administered in ED for Qtc prolongation. Admitted to MICU for HLOC.     Plan:   - Medical toxicology consulted, appreciate recs  - F/U UDS & Lamotrigine level  - Psycyhiatry consult in the setting of suicidal attempt once patient is awake and extubated

## 2025-07-11 NOTE — ASSESSMENT & PLAN NOTE
[place] [facility] [place] likely drug overdose with seroquel with GCS of 4, he was intubated in the ED for airway protection.     Supplemental oxygen was provided and noted-   Vent Mode: A/C  Oxygen Concentration (%):  [30] 30  Resp Rate Total:  [14 br/min-22 br/min] 19 br/min  PEEP/CPAP:  [5 cmH20] 5 cmH20  Mean Airway Pressure:  [7.4 cmH20-8.5 cmH20] 8.1 cmH20    Plan:  - Propofol & Fentanyl for sedation for RASS 0 to -1   - Follow up infectious workup   - Titrate Vent as able for SpO2 >90%  - Vent Bundle in place  - Nutrition consented for tube feed recs   - - SAT/SBT Daily: passed and extubated today     Subjective:       Patient ID: Bryant Gil is a 84 y.o. male.    Chief Complaint: Follow-up    84 yr old pleasant male with CKD 3, DM II, HTN, HLD, atrial tachycardia, presents today for follow up and also his 6 month check and lab work. Some mild memory issues. Never tried neuriva. Tried prevagen. MMSE mild and intact memory.      DM II - controlled -     HGBA1C                   7.4 (H)             07/24/2024                                   - on metformin n glipizide - compliant - no hypoglycemic symptoms - mild CKD 3      HTN - controlled - on HYZAAR, amlodipine and metoprolol      HLD - improving - on statin -    LDLCALC                  94.8                09/15/2023                               History as below - reviewed             Follow-up  Pertinent negatives include no arthralgias, chest pain, congestion, coughing, diaphoresis, headaches, joint swelling, myalgias, neck pain, rash, visual change, vomiting or weakness.   Medication Refill  Pertinent negatives include no arthralgias, chest pain, congestion, coughing, diaphoresis, headaches, joint swelling, myalgias, neck pain, rash, visual change, vomiting or weakness.   Diabetes  He presents for his follow-up diabetic visit. He has type 2 diabetes mellitus. His disease course has been stable. Pertinent negatives for hypoglycemia include no confusion, dizziness, headaches, hunger, nervousness/anxiousness, seizures, speech difficulty or tremors. Pertinent negatives for diabetes include no blurred vision, no chest pain, no foot paresthesias, no foot ulcerations, no polydipsia, no polyuria, no visual change, no weakness and no weight loss. Pertinent negatives for hypoglycemia complications include no blackouts, no nocturnal hypoglycemia and no required assistance. Symptoms are stable. Pertinent negatives for diabetic complications include no autonomic neuropathy, impotence, nephropathy, peripheral neuropathy or retinopathy. Risk factors for coronary artery  disease include diabetes mellitus, dyslipidemia and male sex. Current diabetic treatment includes oral agent (dual therapy). He is compliant with treatment all of the time. Meal planning includes avoidance of concentrated sweets. He has not had a previous visit with a dietitian. He rarely participates in exercise. There is no change in his home blood glucose trend. An ACE inhibitor/angiotensin II receptor blocker is being taken. He does not see a podiatrist.Eye exam is not current.   Hypertension  This is a chronic problem. The current episode started more than 1 year ago. The problem has been gradually improving since onset. The problem is controlled. Pertinent negatives include no blurred vision, chest pain, headaches, neck pain or palpitations. There are no associated agents to hypertension. Risk factors for coronary artery disease include dyslipidemia, male gender and diabetes mellitus. Past treatments include angiotensin blockers, diuretics, calcium channel blockers and beta blockers. The current treatment provides significant improvement. There are no compliance problems.  There is no history of angina, CAD/MI or retinopathy. There is no history of chronic renal disease, hyperaldosteronism, hyperparathyroidism, a hypertension causing med, renovascular disease or a thyroid problem.   Hyperlipidemia  This is a chronic problem. The current episode started more than 1 year ago. The problem is controlled. Recent lipid tests were reviewed and are normal. He has no history of chronic renal disease. There are no known factors aggravating his hyperlipidemia. Pertinent negatives include no chest pain or myalgias. Current antihyperlipidemic treatment includes statins. The current treatment provides significant improvement of lipids. There are no compliance problems.  Risk factors for coronary artery disease include diabetes mellitus, dyslipidemia, hypertension and male sex.     Review of Systems   Constitutional:  Negative.  Negative for activity change, diaphoresis, unexpected weight change and weight loss.   HENT:  Negative for nasal congestion, ear pain, hearing loss, mouth sores, rhinorrhea, trouble swallowing and voice change.    Eyes: Negative.  Negative for blurred vision, pain, discharge and visual disturbance.   Respiratory: Negative.  Negative for apnea, cough, chest tightness and wheezing.    Cardiovascular: Negative.  Negative for chest pain and palpitations.   Gastrointestinal: Negative.  Negative for abdominal distention, anal bleeding, blood in stool, constipation, diarrhea and vomiting.   Endocrine: Negative.  Negative for cold intolerance, polydipsia and polyuria.   Genitourinary: Negative.  Negative for decreased urine volume, difficulty urinating, discharge, frequency, hematuria, impotence, scrotal swelling and urgency.   Musculoskeletal:  Negative for arthralgias, back pain, joint swelling, myalgias, neck pain and neck stiffness.   Integumentary:  Negative for color change and rash. Negative.   Allergic/Immunologic: Negative.  Negative for environmental allergies.   Neurological:  Negative for dizziness, tremors, seizures, speech difficulty, weakness, light-headedness and headaches.   Hematological: Negative.    Psychiatric/Behavioral: Negative.  Negative for agitation, confusion, dysphoric mood and suicidal ideas. The patient is not nervous/anxious.          PMH/PSH/FH/SH/MED/ALLERGY reviewed    Past Medical History:   Diagnosis Date    Cataract of both eyes     Chronic gastritis without bleeding, negative H. pylori Jaunuary 2019 EGD 1/30/2020    Diabetes mellitus     Diverticulosis     Ectatic aorta 2/6/2018    HTN (hypertension)     HTN (hypertension) 10/9/2012    Lower GI bleeding 1/11/2021    Near syncope 2/5/2021    OHT (ocular hypertension)     Prostate cancer     Pyelonephritis, right no stone on CT scan. 7/11/2013    Rectal bleeding 1/11/2021    Sepsis, same organism in urine grew in his blood,  Klebsiella 7/11/2013    Sigmoid diverticulosis 03/19/2017    SOB (shortness of breath) 2/5/2021    Tendinitis of both rotator cuffs 6/25/2013    Tortuous aorta 2/6/2018    Tubular adenoma of colon 01/10/2012    Type II or unspecified type diabetes mellitus with neurological manifestations, not stated as uncontrolled(250.60) 10/9/2012       Past Surgical History:   Procedure Laterality Date    CARPAL TUNNEL RELEASE Right 08/25/2015    CATARACT EXTRACTION W/  INTRAOCULAR LENS IMPLANT Left 6/25/2024    Procedure: EXTRACTION, CATARACT, WITH IOL INSERTION;  Surgeon: Federico Sumner MD;  Location: [facility] OR;  Service: Ophthalmology;  Laterality: Left;    CATARACT EXTRACTION W/  INTRAOCULAR LENS IMPLANT Right 7/16/2024    Procedure: EXTRACTION, CATARACT, WITH IOL INSERTION;  Surgeon: Federico Sumner MD;  Location: [facility] OR;  Service: Ophthalmology;  Laterality: Right;    CIRCUMCISION  04/13/2005    COLONOSCOPY N/A 3/29/2017    Procedure: COLONOSCOPY Golytely prep;  Surgeon: Kavitha Cleaning MD;  Location: [facility];  Service: Endoscopy;  Laterality: N/A;    COLONOSCOPY N/A 1/12/2021    Procedure: COLONOSCOPY;  Surgeon: Dung Panda MD;  Location: [facility];  Service: Endoscopy;  Laterality: N/A;    COLONOSCOPY W/ POLYPECTOMY  01/10/2012    Repeat in 5 years     ESOPHAGOGASTRODUODENOSCOPY N/A 1/8/2019    Procedure: EGD (ESOPHAGOGASTRODUODENOSCOPY);  Surgeon: Rachel Burrows MD;  Location: [facility];  Service: Endoscopy;  Laterality: N/A;    ESOPHAGOGASTRODUODENOSCOPY N/A 5/14/2020    Procedure: ESOPHAGOGASTRODUODENOSCOPY (EGD);  Surgeon: Rachel Burrows MD;  Location: [facility];  Service: Endoscopy;  Laterality: N/A;  Dr. Luis Angel Burrows if possible.    FLEXIBLE SIGMOIDOSCOPY N/A 1/8/2019    Procedure: SIGMOIDOSCOPY, FLEXIBLE;  Surgeon: Rachel Burrows MD;  Location: [facility];  Service: Endoscopy;  Laterality: N/A;    PENILE PROSTHESIS IMPLANT      PROSTATE SURGERY  1999    Prostatectomy for prostate  ca; [facility]    TRIGGER FINGER RELEASE Right 2015       Family History   Problem Relation Name Age of Onset    Hypertension Mother      No Known Problems Father unknown hx     Diabetes Sister Rasheeda     Cataracts Sister Rasheeda     No Known Problems Sister Cinthia     No Known Problems Sister Niki     No Known Problems Sister Shwetha     No Known Problems Brother Jeffery     Heart attack Brother Layne     Coronary artery disease Brother Layne     No Known Problems Brother Asad     Coronary artery disease Brother Milligan     Heart attack Brother Milligan     No Known Problems Daughter x5     No Known Problems Son x2     Blindness Neg Hx      Amblyopia Neg Hx      Glaucoma Neg Hx      Retinal detachment Neg Hx      Strabismus Neg Hx      Macular degeneration Neg Hx      Stroke Neg Hx      Thyroid disease Neg Hx      Colon cancer Neg Hx      Esophageal cancer Neg Hx         Social History     Socioeconomic History    Marital status:    Tobacco Use    Smoking status: Former     Current packs/day: 0.00     Average packs/day: 0.5 packs/day for 3.0 years (1.5 ttl pk-yrs)     Types: Cigarettes     Start date:      Quit date:      Years since quittin.8     Passive exposure: Never    Smokeless tobacco: Never    Tobacco comments:     smoked as a teenager   Substance and Sexual Activity    Alcohol use: Yes     Comment: social drinks a beer 1-2 x month    Drug use: Never    Sexual activity: Yes     Partners: Female   Social History Narrative    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     Social Drivers of Health     Financial Resource Strain: Patient Declined (2024)    Overall Financial Resource Strain (CARDIA)     Difficulty of Paying Living Expenses: Patient declined   Food Insecurity: No Food Insecurity (2024)    Hunger Vital Sign     Worried About Running Out of Food in the Last Year: Never true     Ran Out of Food in the Last Year: Never true   Transportation Needs: No Transportation Needs  (6/7/2024)    PRAPARE - Transportation     Lack of Transportation (Medical): No     Lack of Transportation (Non-Medical): No   Physical Activity: Inactive (7/16/2024)    Exercise Vital Sign     Days of Exercise per Week: 0 days     Minutes of Exercise per Session: 10 min   Stress: No Stress Concern Present (7/16/2024)    [employer] - Occupational Stress Questionnaire     Feeling of Stress : Not at all   Housing Stability: Low Risk  (7/16/2024)    Housing Stability Vital Sign     Unable to Pay for Housing in the Last Year: No     Homeless in the Last Year: No       Current Outpatient Medications   Medication Sig Dispense Refill    amLODIPine (NORVASC) 10 MG tablet TAKE 1 TABLET EVERY DAY 90 tablet 3    aspirin (ECOTRIN) 81 MG EC tablet Take 81 mg by mouth once daily.      atorvastatin (LIPITOR) 20 MG tablet TAKE 1 TABLET EVERY DAY 90 tablet 0    BD ALCOHOL SWABS PadM USE FOR HOME GLUCOSE MONITORING DAILY 100 each 3    blood sugar diagnostic (TRUE METRIX GLUCOSE TEST STRIP) Strp Test Blood Sugar twice daily 100 strip 1    blood-glucose meter (TRUE METRIX GLUCOSE METER) Misc Test blood sugar twice a day 1 each 0    clobetasoL (TEMOVATE) 0.05 % cream Apply topically 2 (two) times daily. Bid-tid focally to ear scars until flat. 45 g 1    glipiZIDE 5 MG TR24 TAKE 1 TABLET EVERY DAY 90 tablet 1    hydrocortisone 2.5 % cream Apply topically 2 (two) times daily. 30 g 3    lancets (TRUEPLUS LANCETS) 30 gauge Misc Check sugar twice daily 200 each 3    latanoprost 0.005 % ophthalmic solution INSTILL 1 DROP INTO BOTH EYES ONE TIME DAILY 7.5 mL 3    losartan-hydrochlorothiazide 100-25 mg (HYZAAR) 100-25 mg per tablet TAKE 1 TABLET EVERY MORNING 90 tablet 3    metFORMIN (GLUCOPHAGE) 500 MG tablet Take 1 tablet (500 mg total) by mouth 2 (two) times daily with meals. 180 tablet 3    metoprolol succinate (TOPROL-XL) 100 MG 24 hr tablet TAKE 1 TABLET ONE TIME DAILY 90 tablet 3     prednisoLONE-moxiflox-bromfen 1-0.5-0.075 % DrpS Place 1 drop into the left eye 3 (three) times daily. 8 mL 2    UNABLE TO FIND medication name: Prevagen      pantoprazole (PROTONIX) 40 MG tablet Take 1 tablet (40 mg total) by mouth 2 (two) times daily. 180 tablet 4     No current facility-administered medications for this visit.       Review of patient's allergies indicates:  No Known Allergies      Objective:       Vitals:    10/07/24 1300   BP: 120/72   Pulse: 71       Physical Exam  Constitutional:       Appearance: He is well-developed.   HENT:      Head: Normocephalic and atraumatic.      Right Ear: External ear normal.      Left Ear: External ear normal.      Nose: Nose normal.      Mouth/Throat:      Pharynx: No oropharyngeal exudate.   Eyes:      General: No scleral icterus.        Right eye: No discharge.         Left eye: No discharge.      Conjunctiva/sclera: Conjunctivae normal.      Pupils: Pupils are equal, round, and reactive to light.   Neck:      Thyroid: No thyromegaly.      Vascular: No JVD.      Trachea: No tracheal deviation.   Cardiovascular:      Rate and Rhythm: Normal rate and regular rhythm.      Heart sounds: Normal heart sounds. No murmur heard.     No friction rub. No gallop.   Pulmonary:      Effort: Pulmonary effort is normal. No respiratory distress.      Breath sounds: Normal breath sounds. No stridor. No wheezing or rales.   Chest:      Chest wall: No tenderness.   Abdominal:      General: Bowel sounds are normal. There is no distension.      Palpations: Abdomen is soft. There is no mass.      Tenderness: There is no abdominal tenderness. There is no guarding or rebound.      Hernia: No hernia is present.   Musculoskeletal:         General: No tenderness. Normal range of motion.      Cervical back: Normal range of motion and neck supple.   Lymphadenopathy:      Cervical: No cervical adenopathy.   Skin:     General: Skin is warm and dry.      Coloration: Skin is not pale.       Findings: No erythema or rash.   Neurological:      Mental Status: He is alert and oriented to person, place, and time.      Cranial Nerves: No cranial nerve deficit.      Motor: No abnormal muscle tone.      Coordination: Coordination normal.      Deep Tendon Reflexes: Reflexes are normal and symmetric. Reflexes normal.   Psychiatric:         Behavior: Behavior normal.         Thought Content: Thought content normal.         Judgment: Judgment normal.         Assessment:       Problem List Items Addressed This Visit       Type 2 diabetes mellitus with diabetic polyneuropathy, without long-term current use of insulin - Primary (Chronic)    Relevant Orders    CBC Auto Differential    Comprehensive Metabolic Panel    Hemoglobin A1C    Urinalysis    Microalbumin/Creatinine Ratio, Urine    Mixed hyperlipidemia (Chronic)    Relevant Orders    CBC Auto Differential    Comprehensive Metabolic Panel    Hemoglobin A1C    Urinalysis    Microalbumin/Creatinine Ratio, Urine    Essential hypertension (Chronic)    Relevant Orders    CBC Auto Differential    Comprehensive Metabolic Panel    Hemoglobin A1C    Urinalysis    Microalbumin/Creatinine Ratio, Urine    Type 2 diabetes mellitus with hyperlipidemia    Relevant Orders    CBC Auto Differential    Comprehensive Metabolic Panel    Hemoglobin A1C    Urinalysis    Microalbumin/Creatinine Ratio, Urine    PSVT (paroxysmal supraventricular tachycardia)    Relevant Orders    CBC Auto Differential    Comprehensive Metabolic Panel    Hemoglobin A1C    Urinalysis    Microalbumin/Creatinine Ratio, Urine    Aortic atherosclerosis    Relevant Orders    CBC Auto Differential    Comprehensive Metabolic Panel    Hemoglobin A1C    Urinalysis    Microalbumin/Creatinine Ratio, Urine     Other Visit Diagnoses       Flu vaccine need        Relevant Medications    influenza (adjuvanted) (Fluad) 45 mcg/0.5 mL IM vaccine (> or = 66 yo) 0.5 mL (Completed)    Other Relevant Orders    CBC Auto  Differential    Comprehensive Metabolic Panel    Hemoglobin A1C    Urinalysis    Microalbumin/Creatinine Ratio, Urine    Stage 3b chronic kidney disease        Relevant Orders    CBC Auto Differential    Comprehensive Metabolic Panel    Hemoglobin A1C    Urinalysis    Microalbumin/Creatinine Ratio, Urine    Gastroesophageal reflux disease, unspecified whether esophagitis present        Relevant Medications    pantoprazole (PROTONIX) 40 MG tablet    Other Relevant Orders    CBC Auto Differential    Comprehensive Metabolic Panel    Hemoglobin A1C    Urinalysis    Microalbumin/Creatinine Ratio, Urine    Encounter for screening for malignant neoplasm of prostate        Relevant Orders    PSA, Screening            Plan:           Bryant was seen today for follow-up.    Diagnoses and all orders for this visit:    Type 2 diabetes mellitus with diabetic polyneuropathy, without long-term current use of insulin  -     CBC Auto Differential; Future  -     Comprehensive Metabolic Panel; Future  -     Hemoglobin A1C; Future  -     Urinalysis; Future  -     Microalbumin/Creatinine Ratio, Urine; Future    Flu vaccine need  -     influenza (adjuvanted) (Fluad) 45 mcg/0.5 mL IM vaccine (> or = 66 yo) 0.5 mL  -     CBC Auto Differential; Future  -     Comprehensive Metabolic Panel; Future  -     Hemoglobin A1C; Future  -     Urinalysis; Future  -     Microalbumin/Creatinine Ratio, Urine; Future    Essential hypertension  -     CBC Auto Differential; Future  -     Comprehensive Metabolic Panel; Future  -     Hemoglobin A1C; Future  -     Urinalysis; Future  -     Microalbumin/Creatinine Ratio, Urine; Future    Aortic atherosclerosis  -     CBC Auto Differential; Future  -     Comprehensive Metabolic Panel; Future  -     Hemoglobin A1C; Future  -     Urinalysis; Future  -     Microalbumin/Creatinine Ratio, Urine; Future    Mixed hyperlipidemia  -     CBC Auto Differential; Future  -     Comprehensive Metabolic Panel; Future  -      Hemoglobin A1C; Future  -     Urinalysis; Future  -     Microalbumin/Creatinine Ratio, Urine; Future    PSVT (paroxysmal supraventricular tachycardia)  -     CBC Auto Differential; Future  -     Comprehensive Metabolic Panel; Future  -     Hemoglobin A1C; Future  -     Urinalysis; Future  -     Microalbumin/Creatinine Ratio, Urine; Future    Stage 3b chronic kidney disease  -     CBC Auto Differential; Future  -     Comprehensive Metabolic Panel; Future  -     Hemoglobin A1C; Future  -     Urinalysis; Future  -     Microalbumin/Creatinine Ratio, Urine; Future    Type 2 diabetes mellitus with hyperlipidemia  -     CBC Auto Differential; Future  -     Comprehensive Metabolic Panel; Future  -     Hemoglobin A1C; Future  -     Urinalysis; Future  -     Microalbumin/Creatinine Ratio, Urine; Future    Gastroesophageal reflux disease, unspecified whether esophagitis present  -     pantoprazole (PROTONIX) 40 MG tablet; Take 1 tablet (40 mg total) by mouth 2 (two) times daily.  -     CBC Auto Differential; Future  -     Comprehensive Metabolic Panel; Future  -     Hemoglobin A1C; Future  -     Urinalysis; Future  -     Microalbumin/Creatinine Ratio, Urine; Future    Encounter for screening for malignant neoplasm of prostate  -     PSA, Screening; Future          HTN  -controlled    DM II  -controlled    HLD  -not at goal  -diet control n statin    Itching  -kenalog as directed    Ckd 3  -fluids  -labs x 1-2 m    Spent adequate time in obtaining history and explaining differentials    40 minutes spent during this visit of which greater than 50% devoted to face-face counseling and coordination of care regarding diagnosis and management plan    Follow up in about 3 months (around 1/7/2025), or if symptoms worsen or fail to improve.

## 2025-07-11 NOTE — PLAN OF CARE
[facility] - Medical ICU  Initial Discharge Assessment       Primary Care Provider: Dk Diaz MD    Admission Diagnosis: Respiratory depression [R06.89]  QT prolongation [R94.31]  Endotracheally intubated [Z97.8]  AMS (altered mental status) [R41.82]    Admission Date: 7/9/2025  Expected Discharge Date: 7/15/2025    Transition of Care Barriers: None    Payor: HUMANA MANAGED MEDICARE / Plan: HU[employer]NA MEDICARE SELECT PARTNER / Product Type: Medicare Advantage /     Extended Emergency Contact Information  Primary Emergency Contact: erika lutz  Mobile Phone: [phone]  Relation: Father  Preferred language: English   needed? No    Discharge Plan A:  (TBD)  Discharge Plan B: [id number] - [place] - [address] AT [facility] [address]  [address] [place] [address]  Phone: [phone] Fax: [phone]    [facility] - [place] [address]  [address] [place] [address]  Phone: [phone] Fax: [phone]      Initial Assessment (most recent)       Adult Discharge Assessment - 07/11/25 1441          Discharge Assessment    Assessment Type Discharge Planning Assessment     Confirmed/corrected address, phone number and insurance Yes     Source of Information family     Communicated EVERETTE with patient/caregiver Date not available/Unable to determine     People in Home alone     Facility Arrived From: home     Do you expect to return to your current living situation? Yes     Do you have help at home or someone to help you manage your care at home? No     Prior to hospitilization cognitive status: Alert/Oriented     Current cognitive status: Coma/Sedated/Intubated     Walking or Climbing Stairs Difficulty no     Dressing/Bathing Difficulty no     Home Accessibility stairs to enter home     Number of Stairs, Main Entrance four     Stair Railings, Main Entrance railings safe and in good condition     Home Layout Able to live on  1st floor     Equipment Currently Used at Home none     Readmission within 30 days? No     Patient currently being followed by outpatient case management? No     Do you currently have service(s) that help you manage your care at home? No     Do you take prescription medications? Yes     Do you have prescription coverage? Yes     Do you have any problems affording any of your prescribed medications? TBD     Who is going to help you get home at discharge? family to assist     How do you get to doctors appointments? family or friend will provide     Are you on dialysis? No     Do you take coumadin? No     Discharge Plan A --   TBD    Discharge Plan B Psychiatric hospital     DME Needed Upon Discharge  none     Discharge Plan discussed with: Parent(s)     Name(s) and Number(s) Agnes     Transition of Care Barriers None        Financial Resource Strain    How hard is it for you to pay for the very basics like food, housing, medical care, and heating? Not very hard        Housing Stability    In the last 12 months, was there a time when you were not able to pay the mortgage or rent on time? No     At any time in the past 12 months, were you homeless or living in a shelter (including now)? No        Transportation Needs    In the past 12 months, has lack of transportation kept you from medical appointments or from getting medications? No     In the past 12 months, has lack of transportation kept you from meetings, work, or from getting things needed for daily living? No        Food Insecurity    Within the past 12 months, you worried that your food would run out before you got the money to buy more. Never true     Within the past 12 months, the food you bought just didn't last and you didn't have money to get more. Never true        Alcohol Use    Q1: How often do you have a drink containing alcohol? Monthly or less     Q2: How many drinks containing alcohol do you have on a typical day when you are drinking? 1 or 2         Utilities    In the past 12 months has the electric, gas, oil, or water company threatened to shut off services in your home? No        Health Literacy    How often do you need to have someone help you when you read instructions, pamphlets, or other written material from your doctor or pharmacy? Sometimes                   Discharge Plan A and Plan B have been determined by review of patient's clinical status, future medical and therapeutic needs, and coverage/benefits for post-acute care in coordination with multidisciplinary team members.     CM spoke with Father Agnes  patient in Room intubated. All information was verified on facesheet. Patient lives in a 1 story house alone, 4 steps to get inside.  Per Father states PTA Patient required no assistance. Patient can ambulate, drive, run errands, and complete ADL's independently. Patient does not use any DME or any in-home assistive equipment. Patient has not used Home Health previously. Patient is not on dialysis nor use Coumadin as a blood thinner. Patient takes medication as prescribed and has resources for all prescriptive needs. Patient will have help from family member upon discharge. Family will provide transportation home. All Questions and concerns addressed and whiteboard updated with CM contact information. Will continue to follow for course of hospitalization.    Rayna Willis RN  Case Management  [phone]

## 2025-07-11 NOTE — CONSULTS
Epilepsy Team    CC: EEG placed for concern for epileptiform activity/seizures    HPI: 44 year old man with hx of schizoaffective disorer, hypothyroidism, hypertension, hyperlipidemia admitted for management of acute encephalopathy after suspected suicide attempt/quetiapine overdose.     Unable to obtain patient's family and social history due to patient intubated  Patient is not currently maintained on anti-seizure medication, Propofol discontinued 7/11 am    Review of systems:  Not performed secondary to patient intubated      Physical Exam  General: Sedated, intubated, no acute distress.   HEENT: Normocephalic. Atraumatic. EEG in place.   Pulmonary: Effort normal, no respiratory distress.  Skin: Warm and dry.  Psych: Unable to assess.   Neuro:  CN:   Arouses to stimulation  SELENA OU   Corneal intact OU   + spontaneous eye opening   Not following commands     Exam findings to suggest seizures:  Myoclonus - no   eye twitching - no   Nystagmus - no   gaze deviation - no   waxy rigidity - no      EEG reviewed by Epileptologist, findings include rare generalized spike and wave discharges consistent with generalized cortical irritabilit; no electrographic seizures; see EEG report for details.     Recommendations: No indication for management with anti-seizure drug at this time. Recommend to continue EEG. Findings and recommendations discussed with primary team.      Acute metabolic encephalopathy  Quetiapine overdose  - Management per Critical Care, Medical Toxicology   - Propofol discontinued 7/11 am  - EKG with improving QTc  - CC/Medical Toxicology following EKGs, trending electrolytes  - EEG 7/10>7/11 with rare generalized spike and wave discharges. Propofol discontinued 7/11, EEG so far stable off anesthetic    Respiratory failure  - Management per Critical Care    Possible suicide attempt  - PEC/psychiatry evaluation after extubation     We will continue EEG and follow up. Please do not hesitate to call us with  any questions or concerns.      Crys Benavidez PA-C  Neurology Epilepsy  Staff: Dr. Luther

## 2025-07-11 NOTE — PROGRESS NOTES
[facility]  Progress Note  07/11/2025  11:27 AM    #Quetiapine ingestion  -remains sedated on propofol. No roving eye movements noted today  -EKG: NSR, QTC at 457, much improved from previous  -Labs: normal lytes/creatinine  -Overall, much improved. EKG with an improving QTC, suggesting further clearance of quetiapine. He has not been hypotensive or tachycardic either which are reassuring.    Recommendations:    -Improving toxicity  -Follow EKGs, lytes  -Wean vent as tolerated, mental status should hopefully be improving now that drug is wearing off      Overnight events and hospital course:   NAEO    REVIEW OF SYSTEMS:    All systems reviewed and negative except as noted in HPI    PHYSICAL EXAM:    General: sedated  Head: Normocephalic, atraumatic.  HEENT: no roving eye movements noted  Neck: Supple.  Cardiovascular: Normal rate   Pulm/Chest: Lungs clear to auscultation; on vent  Abdomen: Nontender. Nondistended.   Neuro:   -sedated today  -no clonus noted     Skin: Warm, dry, and intact.  Psych: remains obtunded    Results for orders placed or performed during the hospital encounter of 07/09/25   EKG 12-lead   Result Value Ref Range    QRS Duration 112 ms    [facility] QTC Calculation 535 ms    Narrative    Test Reason : R41.82,    Vent. Rate :  95 BPM     Atrial Rate :  95 BPM     P-R Int : 178 ms          QRS Dur : 112 ms      QT Int : 426 ms       P-R-T Axes :  42  93  53 degrees    QTcB Int : 535 ms    Normal sinus rhythm  Rightward axis  Prolonged QT  Abnormal ECG  When compared with ECG of 09-Jul-2025 20:58,  No significant change was found  Confirmed by Ashish Tay ([id number]) on 7/10/2025 7:33:47 AM    Referred By:            Confirmed By: Ashish Tay        RELEVANT LABS / IMAGING    - reviewed          ASSESSMENT:     44 y.o. male progressing as expected with a presumed quetiapine overdose    RECOMMENDATIONS:    SEE ABOVE    I spent 30 minutes in review of the case and relevant documentation,  lab work and imaging.  I discussed the case with the nurse and the primary team.    Please contact on-call medical  with questions.    Raul Paulino MD  Medical Toxicology Attending    Please note: this is a Medical Toxicology Consultation note, not an Emergency Department note.

## 2025-07-11 NOTE — EICU
Intervention Initiated From:  COR / [facility]    Christian intervened regarding:  Rounding (Video assessment)  VICU Night Rounds Checklist  24H Vital Sign Range:  Temp:  [88 °F (31.1 °C)-99.9 °F (37.7 °C)]   Pulse:  []   Resp:  [7-25]   BP: (119-161)/(65-88)   SpO2:  [95 %-100 %]     Video rounds

## 2025-07-11 NOTE — PROGRESS NOTES
Pharmacokinetic Initial Assessment: IV Vancomycin    Assessment/Plan:    Initiate intravenous vancomycin with loading dose of 2250 mg once followed by a maintenance dose of vancomycin 1750mg IV every 12 hours  Desired empiric serum trough concentration is 15 to 20 mcg/mL  Draw vancomycin trough level 30-60 min prior to fourth dose on 7/13 at approximately 0630  Pharmacy will continue to follow and monitor vancomycin.      Please contact pharmacy at extension [phone] with any questions regarding this assessment.     Thank you for the consult,   Gustavo Clinton       Patient brief summary:  Ravi Ibanez is a 44 y.o. male initiated on antimicrobial therapy with IV Vancomycin for treatment of suspected lower respiratory infection    Drug Allergies:   Review of patient's allergies indicates:  No Known Allergies    Actual Body Weight:   96 kg    Renal Function:   Estimated Creatinine Clearance: 126.4 mL/min (based on SCr of 0.9 mg/dL).,     Dialysis Method (if applicable):  N/A    CBC (last 72 hours):  Recent Labs   Lab Result Units 07/09/25  2058 07/10/25  0305 07/11/25  0359   WBC K/uL 6.62 8.54 8.79   HGB gm/dL 12.8* 12.4* 12.7*   HCT % 38.3* 37.3* 37.6*   Platelet Count K/uL 208 228 185   Lymph % % 14.0*  --  14.7*   Mono % % 4.5  --  4.6   Eos % % 0.6  --  1.6   Basophil % % 0.3  --  0.3       Metabolic Panel (last 72 hours):  Recent Labs   Lab Result Units 07/09/25 2058 07/09/25  2139 07/10/25  0109 07/10/25  0305 07/10/25  0938 07/10/25  1643 07/11/25  0011 07/11/25  0359 07/11/25  0800 07/11/25  1545   Sodium mmol/L 138  --  137 137 136 139 135* 135* 136 135*   Potassium mmol/L 2.9*  --  3.5 3.4* 3.5 4.5 4.6 4.5 4.3 4.4   Chloride mmol/L 108  --  109 109 106 106 107 107 105 106   CO2 mmol/L 18*  --  20* 21* 22* 24 21* 22* 24 18*   Glucose mg/dL 156*  --  132* 85 108 98 92 92 91 140*   Glucose, UA   --  1+*  --   --   --   --   --   --   --   --    BUN mg/dL 12  --  12 11 11 19 9 10 9 11   Creatinine mg/dL 0.8   "--  0.8 0.8 1.0 1.1 0.9 0.9 1.0 0.9   Urine Creatinine mg/dL  --  221.0  221.0  223.0  --   --   --   --   --   --   --   --    Albumin g/dL 4.0  --   --  3.9 3.7  --   --  3.5  --   --    Bilirubin Total mg/dL 0.6  --   --  0.7 0.7  --   --  1.0  --   --    ALP unit/L 40  --   --  36* 41  --   --  47  --   --    AST unit/L 17  --   --  15 17  --   --  18  --   --    ALT unit/L 23  --   --  21 22  --   --  19  --   --    Magnesium  mg/dL 1.7  --   --  2.0  --   --   --  2.9*  --   --    Phosphorus Level mg/dL  --   --   --  2.3*  --   --   --  2.3*  --   --        Drug levels (last 3 results):  No results for input(s): "VANCOMYCINRA", "VANCORANDOM", "VANCOMYCINPE", "VANCOPEAK", "VANCOMYCINTR", "VANCOTROUGH" in the last 72 hours.    Microbiologic Results:  Microbiology Results (last 7 days)       Procedure Component Value Units Date/Time    Blood culture [[id number]] Collected: 07/11/25 1641    Order Status: Sent Specimen: Blood from Peripheral, Hand, Left     Blood culture [[id number]] Collected: 07/11/25 1641    Order Status: Sent Specimen: Blood from Peripheral, Hand, Left     Blood culture [[id number]]     Order Status: Sent Specimen: Blood     Blood culture [[id number]]     Order Status: Sent Specimen: Blood     Respiratory Infection Panel (PCR), Nasopharyngeal [[id number]]     Order Status: Sent Specimen: Nasopharyngeal Swab     Culture, Respiratory with Gram Stain [[id number]]     Order Status: Sent Specimen: Respiratory     Blood culture [[id number]]  (Normal) Collected: 07/09/25 2226    Order Status: Completed Specimen: Blood from Peripheral, Hand, Right Updated: 07/11/25 1101     Blood Culture No Growth After 36 Hours    Blood culture [[id number]]  (Normal) Collected: 07/09/25 2226    Order Status: Completed Specimen: Blood from Peripheral, Hand, Left Updated: 07/11/25 1101     Blood Culture No Growth After 36 Hours    MRSA Screen by PCR [[id number]]  (Normal) Collected: 07/10/25 0004    Order Status: " Completed Specimen: Nasal Swab Updated: 07/10/25 1200     MRSA PCR Screen Not Detected

## 2025-07-11 NOTE — ASSESSMENT & PLAN NOTE
Managed by psychiatry outpatient.    Home Regimen:  Clonazepam 1mg TID   Lamotrigine 200 QD  Risperidone 3mg QD  Quetiapine 300mg BID    Plan:  - Psychiatry consult once extubated   - Holding all psych meds in the setting of overdose   - Lamotrigine level pending

## 2025-07-11 NOTE — SUBJECTIVE & OBJECTIVE
Review of Systems   Unable to perform ROS: Intubated     Objective:     Vital Signs (Most Recent):  Temp: 99.5 °F (37.5 °C) (07/11/25 1510)  Pulse: (!) 129 (07/11/25 1510)  Resp: (!) 22 (07/11/25 1510)  BP: (!) 157/90 (07/11/25 1200)  SpO2: 96 % (07/11/25 1510) Vital Signs (24h Range):  Temp:  [97 °F (36.1 °C)-99.7 °F (37.6 °C)] 99.5 °F (37.5 °C)  Pulse:  [] 129  Resp:  [8-22] 22  SpO2:  [93 %-100 %] 96 %  BP: (117-157)/(70-90) 157/90   Weight: 96 kg (211 lb 10.3 oz)  Body mass index is 29.52 kg/m².      Intake/Output Summary (Last 24 hours) at 7/11/2025 1545  Last data filed at 7/11/2025 0601  Gross per 24 hour   Intake 516.47 ml   Output 1015 ml   Net -498.53 ml          Physical Exam  Vitals and nursing note reviewed.   Constitutional:       General: He is not in acute distress.     Appearance: He is not ill-appearing.   HENT:      Head: Normocephalic.   Eyes:      General: No scleral icterus.     Extraocular Movements:      Right eye: No nystagmus.      Left eye: No nystagmus.      Pupils: Pupils are equal, round, and reactive to light.      Comments: Sluggish pupils bilateral   Cardiovascular:      Rate and Rhythm: Regular rhythm. Tachycardia present.      Heart sounds: Normal heart sounds. No murmur heard.     No friction rub. No gallop.   Pulmonary:      Effort: No respiratory distress.      Breath sounds: No rhonchi or rales.      Comments: Intubated, 30/5  Abdominal:      General: There is no distension.      Palpations: Abdomen is soft.      Tenderness: There is no guarding or rebound.   Musculoskeletal:      Right lower leg: No edema.      Left lower leg: No edema.   Skin:     General: Skin is warm.      Capillary Refill: Capillary refill takes less than 2 seconds.      Coloration: Skin is not jaundiced.      Findings: No bruising.   Neurological:      GCS: GCS eye subscore is 1. GCS verbal subscore is 1. GCS motor subscore is 1.      Coordination: Coordination abnormal.      Deep Tendon Reflexes:  Reflexes abnormal.      Comments: Ocular clonus, hyperreflexia, chorea       Vents:  Vent Mode: A/C (07/11/25 1510)  Ventilator Initiated: Yes (07/10/25 0030)  Set Rate: 14 BPM (07/11/25 1510)  Vt Set: 450 mL (07/10/25 0704)  PEEP/CPAP: 5 cmH20 (07/11/25 1510)  Oxygen Concentration (%): 30 (07/11/25 1510)  Peak Airway Pressure: 14 cmH20 (07/11/25 1510)  Plateau Pressure: 0 cmH20 (07/11/25 1510)  Total Ve: 14.8 L/m (07/11/25 1510)  Negative Inspiratory Force (cm H2O): 0 (07/11/25 1510)  F/VT Ratio<105 (RSBI): (!) 47.41 (07/11/25 1510)  Lines/Drains/Airways       Drain  Duration                  NG/OG Tube 07/09/25 2130 orogastric 18 Fr. Center mouth 1 day         Urethral Catheter 07/09/25 2130 Temperature probe 16 Fr. 1 day              Airway  Duration                  Airway - Non-Surgical 07/09/25 2121 Endotracheal Tube 1 day              Peripheral Intravenous Line  Duration             Peripheral IV Single Lumen 07/09/25 2119 18 G Left Antecubital 1 day    Peripheral IV Single Lumen 07/09/25 2120 20 G Posterior;Right Hand 1 day    Peripheral IV Single Lumen 07/09/25 2138 20 G Right Antecubital 1 day                  Significant Labs:    CBC/Anemia Profile:  Recent Labs   Lab 07/09/25 2058 07/09/25 2105 07/09/25  2356 07/10/25  0305 07/11/25  0359   WBC 6.62  --   --  8.54 8.79   HGB 12.8*  --   --  12.4* 12.7*   HCT 38.3*   < > 37 37.3* 37.6*     --   --  228 185   MCV 89  --   --  89 91   RDW 12.1  --   --  12.4 12.8    < > = values in this interval not displayed.        Chemistries:  Recent Labs   Lab 07/09/25  2058 07/10/25  0109 07/10/25  0305 07/10/25  0938 07/10/25  1643 07/11/25  0011 07/11/25  0359 07/11/25  0800      < > 137 136   < > 135* 135* 136   K 2.9*   < > 3.4* 3.5   < > 4.6 4.5 4.3      < > 109 106   < > 107 107 105   CO2 18*   < > 21* 22*   < > 21* 22* 24   BUN 12   < > 11 11   < > 9 10 9   CREATININE 0.8   < > 0.8 1.0   < > 0.9 0.9 1.0   CALCIUM 8.3*   < > 8.0* 8.2*   < >  8.9 8.6* 8.2*   ALBUMIN 4.0  --  3.9 3.7  --   --  3.5  --    PROT 6.3  --  5.9* 6.0  --   --  6.1  --    BILITOT 0.6  --  0.7 0.7  --   --  1.0  --    ALKPHOS 40  --  36* 41  --   --  47  --    ALT 23  --  21 22  --   --  19  --    AST 17  --  15 17  --   --  18  --    MG 1.7  --  2.0  --   --   --  2.9*  --    PHOS  --   --  2.3*  --   --   --  2.3*  --     < > = values in this interval not displayed.       All pertinent labs within the past 24 hours have been reviewed.    Significant Imaging: I have reviewed all pertinent imaging results/findings within the past 24 hours.

## 2025-07-11 NOTE — ASSESSMENT & PLAN NOTE
Patient's most recent potassium results are listed below.   Recent Labs     07/11/25  0011 07/11/25  0359 07/11/25  0800   K 4.6 4.5 4.3       Plan:  - Replete potassium PRN per protocol

## 2025-07-11 NOTE — PROGRESS NOTES
[facility] - Medical ICU  Critical Care Medicine  Progress Note    Patient Name: Ravi Ibanez  MRN: [id number]  Admission Date: 7/9/2025  Hospital Length of Stay: 2 days  Code Status: Full Code  Attending Provider: Joni Espino*  Primary Care Provider: Dk Diaz MD   Principal Problem: Overdose of antipsychotic    Subjective:     HPI:  Mr. Ravi Ibanez is a 45 y/o male with Schizoaffective disorder, hypothyroidism, hypertension and hyperlipidemia who presented to [facility] ED via EMS with concerns for AMS and suspected suicide attempt. History obtained from chart review. Patient father stated that several days ago patient mentioned feeling depressed and suicidal. His father went to check on him after not hearing from him all day and found him obtunded next to an empty bottle of Seroquel. Unclear of how much time passed between suspected overdose and when he was found.   In the ED patient is obtunded and tachycardic with a GCS 4. EKG with Qtc prolongation. UA +Benzos and Cannabis, hypokalemic. Glucose, LFTs, TSH and B12 WNL. CT head without acute intracranial pathologies. 7.32/38.8 with a POC Lactate of 4.2. Patient is intubated in the ED for airway protection. Poison control was contacted given concerns for Seroquel ingestion, recommended supportive care and monitoring for anticholinergic poisoning. Toxicology consulted.     Critical Care consulted for management of respiratory failure in the setting of metabolic encephalopathy.          Hospital/ICU Course:  07/11/25: No SZ activity noted on EEG. Passes SAT/SBT --> extubated.    Review of Systems   Unable to perform ROS: Intubated     Objective:     Vital Signs (Most Recent):  Temp: 99.5 °F (37.5 °C) (07/11/25 1510)  Pulse: (!) 129 (07/11/25 1510)  Resp: (!) 22 (07/11/25 1510)  BP: (!) 157/90 (07/11/25 1200)  SpO2: 96 % (07/11/25 1510) Vital Signs (24h Range):  Temp:  [97 °F (36.1 °C)-99.7 °F (37.6 °C)] 99.5 °F (37.5 °C)  Pulse:  []  129  Resp:  [8-22] 22  SpO2:  [93 %-100 %] 96 %  BP: (117-157)/(70-90) 157/90   Weight: 96 kg (211 lb 10.3 oz)  Body mass index is 29.52 kg/m².      Intake/Output Summary (Last 24 hours) at 7/11/2025 1545  Last data filed at 7/11/2025 0601  Gross per 24 hour   Intake 516.47 ml   Output 1015 ml   Net -498.53 ml          Physical Exam  Vitals and nursing note reviewed.   Constitutional:       General: He is not in acute distress.     Appearance: He is not ill-appearing.   HENT:      Head: Normocephalic.   Eyes:      General: No scleral icterus.     Extraocular Movements:      Right eye: No nystagmus.      Left eye: No nystagmus.      Pupils: Pupils are equal, round, and reactive to light.      Comments: Sluggish pupils bilateral   Cardiovascular:      Rate and Rhythm: Regular rhythm. Tachycardia present.      Heart sounds: Normal heart sounds. No murmur heard.     No friction rub. No gallop.   Pulmonary:      Effort: No respiratory distress.      Breath sounds: No rhonchi or rales.      Comments: Intubated, 30/5  Abdominal:      General: There is no distension.      Palpations: Abdomen is soft.      Tenderness: There is no guarding or rebound.   Musculoskeletal:      Right lower leg: No edema.      Left lower leg: No edema.   Skin:     General: Skin is warm.      Capillary Refill: Capillary refill takes less than 2 seconds.      Coloration: Skin is not jaundiced.      Findings: No bruising.   Neurological:      GCS: GCS eye subscore is 1. GCS verbal subscore is 1. GCS motor subscore is 1.      Coordination: Coordination abnormal.      Deep Tendon Reflexes: Reflexes abnormal.      Comments: Ocular clonus, hyperreflexia, chorea       Vents:  Vent Mode: A/C (07/11/25 1510)  Ventilator Initiated: Yes (07/10/25 0030)  Set Rate: 14 BPM (07/11/25 1510)  Vt Set: 450 mL (07/10/25 0704)  PEEP/CPAP: 5 cmH20 (07/11/25 1510)  Oxygen Concentration (%): 30 (07/11/25 1510)  Peak Airway Pressure: 14 cmH20 (07/11/25 1510)  Plateau  Pressure: 0 cmH20 (07/11/25 1510)  Total Ve: 14.8 L/m (07/11/25 1510)  Negative Inspiratory Force (cm H2O): 0 (07/11/25 1510)  F/VT Ratio<105 (RSBI): (!) 47.41 (07/11/25 1510)  Lines/Drains/Airways       Drain  Duration                  NG/OG Tube 07/09/25 2130 orogastric 18 Fr. Center mouth 1 day         Urethral Catheter 07/09/25 2130 Temperature probe 16 Fr. 1 day              Airway  Duration                  Airway - Non-Surgical 07/09/25 2121 Endotracheal Tube 1 day              Peripheral Intravenous Line  Duration             Peripheral IV Single Lumen 07/09/25 2119 18 G Left Antecubital 1 day    Peripheral IV Single Lumen 07/09/25 2120 20 G Posterior;Right Hand 1 day    Peripheral IV Single Lumen 07/09/25 2138 20 G Right Antecubital 1 day                  Significant Labs:    CBC/Anemia Profile:  Recent Labs   Lab 07/09/25 2058 07/09/25 2105 07/09/25  2356 07/10/25  0305 07/11/25  0359   WBC 6.62  --   --  8.54 8.79   HGB 12.8*  --   --  12.4* 12.7*   HCT 38.3*   < > 37 37.3* 37.6*     --   --  228 185   MCV 89  --   --  89 91   RDW 12.1  --   --  12.4 12.8    < > = values in this interval not displayed.        Chemistries:  Recent Labs   Lab 07/09/25  2058 07/10/25  0109 07/10/25  0305 07/10/25  0938 07/10/25  1643 07/11/25  0011 07/11/25  0359 07/11/25  0800      < > 137 136   < > 135* 135* 136   K 2.9*   < > 3.4* 3.5   < > 4.6 4.5 4.3      < > 109 106   < > 107 107 105   CO2 18*   < > 21* 22*   < > 21* 22* 24   BUN 12   < > 11 11   < > 9 10 9   CREATININE 0.8   < > 0.8 1.0   < > 0.9 0.9 1.0   CALCIUM 8.3*   < > 8.0* 8.2*   < > 8.9 8.6* 8.2*   ALBUMIN 4.0  --  3.9 3.7  --   --  3.5  --    PROT 6.3  --  5.9* 6.0  --   --  6.1  --    BILITOT 0.6  --  0.7 0.7  --   --  1.0  --    ALKPHOS 40  --  36* 41  --   --  47  --    ALT 23  --  21 22  --   --  19  --    AST 17  --  15 17  --   --  18  --    MG 1.7  --  2.0  --   --   --  2.9*  --    PHOS  --   --  2.3*  --   --   --  2.3*  --     <  > = values in this interval not displayed.       All pertinent labs within the past 24 hours have been reviewed.    Significant Imaging: I have reviewed all pertinent imaging results/findings within the past 24 hours.    ABG  Recent Labs   Lab 07/09/25  2356   PH 7.297*   PO2 52   PCO2 44.5   HCO3 21.7*   BE -5*     Assessment/Plan:     Neuro  Acute metabolic encephalopathy  VBG 7.323/39.8.   CTH negative for acute intracranial abnormalities.   UDS positive for benzo/THC.   Patient intubated in ED for airway protection. Concern for overdose 2/2 ingestion of prescribed Seroquel as empty prescription bottle was found at bedside per family. Poison control contacted. IV Magnesium administered in ED for Qtc prolongation. Admitted to MICU for HLOC.     Plan:   - Medical toxicology consulted, appreciate recs  - F/U UDS & Lamotrigine level  - Psycyhiatry consult in the setting of suicidal attempt once patient is awake and extubated       Psychiatric  Drug overdose, intentional  PEC and Psych consult after extubation    Schizoaffective disorder  Managed by psychiatry outpatient.    Home Regimen:  Clonazepam 1mg TID   Lamotrigine 200 QD  Risperidone 3mg QD  Quetiapine 300mg BID    Plan:  - Psychiatry consult once extubated   - Holding all psych meds in the setting of overdose   - Lamotrigine level pending      Pulmonary  On mechanically assisted ventilation  Patient presented to [facility] ED following likely drug overdose with seroquel with GCS of 4, he was intubated in the ED for airway protection.     Supplemental oxygen was provided and noted-   Vent Mode: A/C  Oxygen Concentration (%):  [30] 30  Resp Rate Total:  [14 br/min-22 br/min] 19 br/min  PEEP/CPAP:  [5 cmH20] 5 cmH20  Mean Airway Pressure:  [7.4 cmH20-8.5 cmH20] 8.1 cmH20    Plan:  - Propofol & Fentanyl for sedation for RASS 0 to -1   - Follow up infectious workup   - Titrate Vent as able for SpO2 >90%  - Vent Bundle in place  - Nutrition consented for tube feed recs    - - SAT/SBT Daily: passed and extubated today      Cardiac/Vascular  Hyperlipidemia  Home regimen: Fenofibrate 160mg     Plan:  - Resume when appropriate   - Follow up TAGs    Hypertension  Home regimen: Lisinopril 20mg     Plan:   - Resume home regimen when appropriate     Renal/  Lactic acid acidosis  resolved    Hypokalemia  Patient's most recent potassium results are listed below.   Recent Labs     07/11/25  0011 07/11/25  0359 07/11/25  0800   K 4.6 4.5 4.3       Plan:  - Replete potassium PRN per protocol      Endocrine  Hypothyroidism  Home regimen: Synthroid 75mg   TSH was 0.72    Plan:  - Continue home Levothyroxine     Other  * Overdose of antipsychotic  See above and appreciate Medical Tox consult    Hypothermia  Sequelae of probably seroquel overdose. TSH and T4 WNL, unlikely due to hypothyroidism.    Plan:  - Passive rewarming         Critical Care Time: 40 minutes  Critical secondary to Patient has a condition that poses threat to life and bodily function: Psychiatric Illness with threat to self and others and seroquel overdose  Patient has an abrupt change in neurologic status: encephalopathy  Patient is currently receiving parenteral controlled substances: fentanyl/propofol      Critical care was time spent personally by me on the following activities: development of treatment plan with patient or surrogate and bedside caregivers, discussions with consultants, evaluation of patient's response to treatment, examination of patient, ordering and performing treatments and interventions, ordering and review of laboratory studies, ordering and review of radiographic studies, pulse oximetry, re-evaluation of patient's condition. This critical care time did not overlap with that of any other provider or involve time for any procedures.     Joni Espino MD  Critical Care Medicine  [facility] - Medical ICU

## 2025-07-12 PROBLEM — T68.XXXA HYPOTHERMIA: Status: RESOLVED | Noted: 2025-07-10 | Resolved: 2025-07-12

## 2025-07-12 PROBLEM — R50.9 FEVER: Status: ACTIVE | Noted: 2025-07-12

## 2025-07-12 LAB
ABSOLUTE EOSINOPHIL (OHS): 0.02 K/UL
ABSOLUTE MONOCYTE (OHS): 0.25 K/UL (ref 0.3–1)
ABSOLUTE NEUTROPHIL COUNT (OHS): 6.93 K/UL (ref 1.8–7.7)
ALBUMIN SERPL BCP-MCNC: 3.4 G/DL (ref 3.5–5.2)
ALP SERPL-CCNC: 53 UNIT/L (ref 40–150)
ALT SERPL W/O P-5'-P-CCNC: 18 UNIT/L (ref 10–44)
ANION GAP (OHS): 10 MMOL/L (ref 8–16)
ANION GAP (OHS): 8 MMOL/L (ref 8–16)
ANION GAP (OHS): 9 MMOL/L (ref 8–16)
AST SERPL-CCNC: 23 UNIT/L (ref 11–45)
BASOPHILS # BLD AUTO: 0.02 K/UL
BASOPHILS NFR BLD AUTO: 0.3 %
BILIRUB SERPL-MCNC: 1.6 MG/DL (ref 0.1–1)
BUN SERPL-MCNC: 12 MG/DL (ref 6–20)
CALCIUM SERPL-MCNC: 8.8 MG/DL (ref 8.7–10.5)
CALCIUM SERPL-MCNC: 8.9 MG/DL (ref 8.7–10.5)
CALCIUM SERPL-MCNC: 9.3 MG/DL (ref 8.7–10.5)
CHLORIDE SERPL-SCNC: 104 MMOL/L (ref 95–110)
CHLORIDE SERPL-SCNC: 105 MMOL/L (ref 95–110)
CHLORIDE SERPL-SCNC: 106 MMOL/L (ref 95–110)
CO2 SERPL-SCNC: 21 MMOL/L (ref 23–29)
CREAT SERPL-MCNC: 0.9 MG/DL (ref 0.5–1.4)
CREAT SERPL-MCNC: 1 MG/DL (ref 0.5–1.4)
CREAT SERPL-MCNC: 1 MG/DL (ref 0.5–1.4)
ERYTHROCYTE [DISTWIDTH] IN BLOOD BY AUTOMATED COUNT: 12.4 % (ref 11.5–14.5)
GFR SERPLBLD CREATININE-BSD FMLA CKD-EPI: >60 ML/MIN/1.73/M2
GLUCOSE SERPL-MCNC: 104 MG/DL (ref 70–110)
GLUCOSE SERPL-MCNC: 106 MG/DL (ref 70–110)
GLUCOSE SERPL-MCNC: 109 MG/DL (ref 70–110)
HCT VFR BLD AUTO: 37.7 % (ref 40–54)
HGB BLD-MCNC: 12.4 GM/DL (ref 14–18)
HOLD SPECIMEN: NORMAL
IMM GRANULOCYTES # BLD AUTO: 0.07 K/UL (ref 0–0.04)
IMM GRANULOCYTES NFR BLD AUTO: 0.9 % (ref 0–0.5)
LYMPHOCYTES # BLD AUTO: 0.62 K/UL (ref 1–4.8)
MAGNESIUM SERPL-MCNC: 2 MG/DL (ref 1.6–2.6)
MCH RBC QN AUTO: 29.7 PG (ref 27–31)
MCHC RBC AUTO-ENTMCNC: 32.9 G/DL (ref 32–36)
MCV RBC AUTO: 90 FL (ref 82–98)
MYOGLOBIN UR-MCNC: 26 MCG/L (ref 0–24)
NUCLEATED RBC (/100WBC) (OHS): 0 /100 WBC
PHOSPHATE SERPL-MCNC: 2.8 MG/DL (ref 2.7–4.5)
PLATELET # BLD AUTO: 212 K/UL (ref 150–450)
PMV BLD AUTO: 9.9 FL (ref 9.2–12.9)
POTASSIUM SERPL-SCNC: 4.1 MMOL/L (ref 3.5–5.1)
POTASSIUM SERPL-SCNC: 4.4 MMOL/L (ref 3.5–5.1)
POTASSIUM SERPL-SCNC: 4.5 MMOL/L (ref 3.5–5.1)
PROT SERPL-MCNC: 6.7 GM/DL (ref 6–8.4)
RBC # BLD AUTO: 4.18 M/UL (ref 4.6–6.2)
RELATIVE EOSINOPHIL (OHS): 0.3 %
RELATIVE LYMPHOCYTE (OHS): 7.8 % (ref 18–48)
RELATIVE MONOCYTE (OHS): 3.2 % (ref 4–15)
RELATIVE NEUTROPHIL (OHS): 87.5 % (ref 38–73)
SODIUM SERPL-SCNC: 135 MMOL/L (ref 136–145)
W LAMOTRIGINE: <0.2 UG/ML
WBC # BLD AUTO: 7.91 K/UL (ref 3.9–12.7)

## 2025-07-12 PROCEDURE — 63600175 PHARM REV CODE 636 W HCPCS: Mod: HCNC

## 2025-07-12 PROCEDURE — 20000000 HC ICU ROOM: Mod: HCNC

## 2025-07-12 PROCEDURE — 51701 INSERT BLADDER CATHETER: CPT | Mod: HCNC

## 2025-07-12 PROCEDURE — 25000003 PHARM REV CODE 250: Mod: HCNC | Performed by: INTERNAL MEDICINE

## 2025-07-12 PROCEDURE — 25000003 PHARM REV CODE 250: Mod: HCNC

## 2025-07-12 PROCEDURE — 99291 CRITICAL CARE FIRST HOUR: CPT | Mod: HCNC,,, | Performed by: INTERNAL MEDICINE

## 2025-07-12 PROCEDURE — 80053 COMPREHEN METABOLIC PANEL: CPT | Mod: HCNC

## 2025-07-12 PROCEDURE — 85025 COMPLETE CBC W/AUTO DIFF WBC: CPT | Mod: HCNC

## 2025-07-12 PROCEDURE — 63600175 PHARM REV CODE 636 W HCPCS: Mod: HCNC | Performed by: INTERNAL MEDICINE

## 2025-07-12 PROCEDURE — 27000221 HC OXYGEN, UP TO 24 HOURS: Mod: HCNC

## 2025-07-12 PROCEDURE — 82374 ASSAY BLOOD CARBON DIOXIDE: CPT | Mod: HCNC

## 2025-07-12 PROCEDURE — 87070 CULTURE OTHR SPECIMN AEROBIC: CPT | Mod: HCNC

## 2025-07-12 PROCEDURE — 84100 ASSAY OF PHOSPHORUS: CPT | Mod: HCNC

## 2025-07-12 PROCEDURE — 51798 US URINE CAPACITY MEASURE: CPT | Mod: HCNC

## 2025-07-12 PROCEDURE — 99900035 HC TECH TIME PER 15 MIN (STAT): Mod: HCNC

## 2025-07-12 PROCEDURE — 83735 ASSAY OF MAGNESIUM: CPT | Mod: HCNC

## 2025-07-12 PROCEDURE — 87040 BLOOD CULTURE FOR BACTERIA: CPT | Mod: HCNC | Performed by: INTERNAL MEDICINE

## 2025-07-12 PROCEDURE — 94761 N-INVAS EAR/PLS OXIMETRY MLT: CPT | Mod: HCNC

## 2025-07-12 RX ORDER — LEVOTHYROXINE SODIUM 75 UG/1
75 TABLET ORAL
Status: DISCONTINUED | OUTPATIENT
Start: 2025-07-13 | End: 2025-07-15 | Stop reason: HOSPADM

## 2025-07-12 RX ORDER — AMOXICILLIN 250 MG
1 CAPSULE ORAL DAILY
Status: CANCELLED | OUTPATIENT
Start: 2025-07-12

## 2025-07-12 RX ORDER — POLYETHYLENE GLYCOL 3350 17 G/17G
17 POWDER, FOR SOLUTION ORAL DAILY
Status: CANCELLED | OUTPATIENT
Start: 2025-07-12

## 2025-07-12 RX ADMIN — DIAZEPAM 5 MG: 10 INJECTION, SOLUTION INTRAMUSCULAR; INTRAVENOUS at 02:07

## 2025-07-12 RX ADMIN — DIAZEPAM 5 MG: 10 INJECTION, SOLUTION INTRAMUSCULAR; INTRAVENOUS at 10:07

## 2025-07-12 RX ADMIN — DEXMEDETOMIDINE HYDROCHLORIDE 0.7 MCG/KG/HR: 4 INJECTION INTRAVENOUS at 05:07

## 2025-07-12 RX ADMIN — ACETAMINOPHEN 650 MG: 650 SUPPOSITORY RECTAL at 08:07

## 2025-07-12 RX ADMIN — DIAZEPAM 5 MG: 10 INJECTION, SOLUTION INTRAMUSCULAR; INTRAVENOUS at 05:07

## 2025-07-12 RX ADMIN — ENOXAPARIN SODIUM 40 MG: 40 INJECTION SUBCUTANEOUS at 04:07

## 2025-07-12 RX ADMIN — ACETAMINOPHEN 650 MG: 650 SUPPOSITORY RECTAL at 02:07

## 2025-07-12 RX ADMIN — PIPERACILLIN SODIUM AND TAZOBACTAM SODIUM 4.5 G: 4; .5 INJECTION, POWDER, FOR SOLUTION INTRAVENOUS at 04:07

## 2025-07-12 RX ADMIN — PIPERACILLIN SODIUM AND TAZOBACTAM SODIUM 4.5 G: 4; .5 INJECTION, POWDER, FOR SOLUTION INTRAVENOUS at 01:07

## 2025-07-12 RX ADMIN — VANCOMYCIN HYDROCHLORIDE 1750 MG: 500 INJECTION, POWDER, LYOPHILIZED, FOR SOLUTION INTRAVENOUS at 05:07

## 2025-07-12 RX ADMIN — DEXMEDETOMIDINE HYDROCHLORIDE 0.5 MCG/KG/HR: 4 INJECTION INTRAVENOUS at 11:07

## 2025-07-12 RX ADMIN — PIPERACILLIN SODIUM AND TAZOBACTAM SODIUM 4.5 G: 4; .5 INJECTION, POWDER, FOR SOLUTION INTRAVENOUS at 08:07

## 2025-07-12 NOTE — EICU
Virtual ICU Quality Rounds    Admit Date: 7/9/2025  Hospital Day: 3    ICU Day: 2d 9h    24H Vital Sign Range:  Temp:  [97.2 °F (36.2 °C)-103.3 °F (39.6 °C)]   Pulse:  []   Resp:  [8-36]   BP: (118-157)/(61-94)   SpO2:  [88 %-100 %]     VICU Surveillance Screening  LDA reconciliation : Yes

## 2025-07-12 NOTE — PLAN OF CARE
MICU DAILY GOALS     Family/Goals of care/Code Status   Code Status: Full Code    24H Vital Sign Range  Temp:  [99 °F (37.2 °C)-103.3 °F (39.6 °C)]   Pulse:  [102-125]   Resp:  [16-30]   BP: (118-202)/()   SpO2:  [92 %-100 %]      Shift Events (include procedures and significant events)   No acute events throughout shift, precedex off. EEG neg.     AWAKE RASS: Goal -    Actual - RASS (Marquez Agitation-Sedation Scale): alert and calm    Restraint necessity: Treatment interference   BREATHE SBT: Not intubated    Coordinate A & B, analgesics/sedatives Pain: managed   SAT: Not intubated   Delirium CAM-ICU:     Early(intubated/ Progressive (non-intubated) Mobility MOVE Screen (INTUBATED ONLY): Not intubated    Activity: Activity Management: Rolling - L1   Feeding/Nutrition Diet order: Diet/Nutrition Received: NPO,     Thrombus DVT prophylaxis: VTE Core Measure: Pharmacological prophylaxis initiated/maintained   HOB Elevation Head of Bed (HOB) Positioning: HOB at 30-45 degrees   Ulcer Prophylaxis GI: no   Glucose control managed     Skin Skin assessment:     Sacrum intact/not altered? Yes  Heels intact/not altered? Yes  Surgical wound? No    CHECK ONE!   (no altered skin or altered skin) and sub boxes:  [x] No Altered Skin Integrity Present    []Prevention Measures Documented    [] Altered Skin Integrity Present or Discovered   [] LDA already present in EPIC, daily doc completed              [] LDA added if not already in EPIC (describe/stage wound).               [] Wound Image Taken (required on admit,                   transfer/discharge and every Tuesday)    Wound Care Consulted? No   Bowel Function no issues    Indwelling Catheter Necessity [REMOVED]      Urethral Catheter 07/09/25 2130 Temperature probe 16 Fr.-Reason for Continuing Urinary Catheterization: Critically ill in ICU and requiring hourly monitoring of intake/output          De-escalation Antibiotics No        VS and assessment per flow sheet,  patient progressing towards goals as tolerated, plan of care reviewed with family, all concerns addressed, will continue to monitor.

## 2025-07-12 NOTE — EICU
Intervention Initiated From:  [facility] / PETEU    Christian intervened regarding:  Rounding (Video assessment)    VICU Night Rounds Checklist  24H Vital Sign Range:  Temp:  [97 °F (36.1 °C)-103.3 °F (39.6 °C)]   Pulse:  []   Resp:  [8-36]   BP: (117-157)/(70-90)   SpO2:  [88 %-100 %]     Video rounds and LDA reconciliation

## 2025-07-12 NOTE — PROGRESS NOTES
[facility]  Progress Note  07/12/2025  1:22 PM    #Quetiapine ingestion  -seen bedside today, extubated yesterday  -EKG improved QTC  -Labs stable  -On exam he remains quite encephalopathic, low voice/whispering, some response to questions but not much. Likely a bit anticholinergic still, however cardiotoxicity has largely worn off which is reassuring.    Recommendations:   -Continue supportive care   -Doubt any significant worsening toxicity   -Will sign off    Overnight events and hospital course:   extubated    REVIEW OF SYSTEMS:    All systems reviewed and negative except as noted in HPI    PHYSICAL EXAM:    General: awake  Head: Normocephalic, atraumatic.  HEENT: EOMI, pupils midrange  Neck: Supple.  Cardiovascular: Tachycardic  Pulm/Chest: Respirations are even and unlabored.  Neuro:   -no obvious clonus  -whispering low voice  -remains encephalopathic    Skin: Dry   Psych: confused    Results for orders placed or performed during the hospital encounter of 07/09/25   EKG 12-lead   Result Value Ref Range    QRS Duration 112 ms    [facility] QTC Calculation 535 ms    Narrative    Test Reason : R41.82,    Vent. Rate :  95 BPM     Atrial Rate :  95 BPM     P-R Int : 178 ms          QRS Dur : 112 ms      QT Int : 426 ms       P-R-T Axes :  42  93  53 degrees    QTcB Int : 535 ms    Normal sinus rhythm  Rightward axis  Prolonged QT  Abnormal ECG  When compared with ECG of 09-Jul-2025 20:58,  No significant change was found  Confirmed by Ashish Tay ([id number]) on 7/10/2025 7:33:47 AM    Referred By:            Confirmed By: Ashish Tay        RELEVANT LABS / IMAGING    - reviewed    ASSESSMENT:     44 y.o. male progressing as expected with a presumed quetiapine ingestion    RECOMMENDATIONS:    SEE ABOVE      I spent 30 minutes in review of the case and relevant documentation, lab work and imaging.  I discussed the case with the nurse.    Please contact on-call medical  with questions.    Raul  MD [facility]  Medical Toxicology Attending    Please note: this is a Medical Toxicology Consultation note, not an Emergency Department note.

## 2025-07-12 NOTE — PROCEDURES
EEG Extended Monitoring greater than one hour    Date/Time: 7/12/2025 9:55 AM    Performed by: Bimal Gonzales MD  Authorized by: Bryan Kwong PA-C      ICU EEG/VIDEO MONITORING REPORT    DATE OF SERVICE: 7/11/25-7/12/25  EEG NUMBER: [id number]  REQUESTED BY: Varghese  LOCATION OF SERVICE: [facility] [place]    METHODOLOGY   Electroencephalographic (EEG) recording is with electrodes placed according to the International 10-20 placement system.  Thirty two (32) channels of digital signal are simultaneously recorded from the scalp and may include EKG, EMG, and/or eye monitors.   Recording band pass was 0.1 to 512 hz.  Digital video recording of the patient is simultaneously recorded with the EEG.  The nursing staff report clinical symptoms and may press an event button when the patient has symptoms of clinical interest to the treating physicians.  EEG and video recording is stored and archived in digital format.  The entire recording is visually reviewed and the times identified by computer analysis as being spikes or seizures are reviewed again.  Activation procedures which include photic stimulation, hyperventilation and instructing patients to perform simple task are done in selected patients.   Compresses spectral analysis (CSA) is also performed on the activity recorded from each individual channel.  This is displayed as a power display of frequencies from 0 to 30 Hz over time.   The CSA analysis is done and displayed continuously.  This is reviewed for asymmetries in power between homologous areas of the scalp and for presence of changes in power which canbe seen when seizures occur.  Sections of suspected abnormalities on the CSA is then compared with the original EEG recording.     [employer] software was also utilized in the review of this study.  This software suite analyzes the EEG recording in multiple domains.  Coherence and rhythmicity is computed to identify EEG sections which may contain organized seizures.  Each  channel undergoes analysis to detect presence of spike and sharp waves which have special and morphological characteristic of epileptic activity.  The routine EEG recording is converted from spacial into frequency domain.  This is then displayed comparing homologous areas to identify areas of significant asymmetry.  Algorithm to identify non-cortically generated artifact is used to separate eye movement, EMG and other artifact from the EEG.      Recording Times  Start on 7/11/25 at 11:08  Stop on 7/12/25 at 07:00  Start on 7/12/25 at 07:00  Stop on 7/12/25 at 10:27  A total of 21 hour of EEG was recorded.    EEG FINDINGS  The record shows a good  organization at rest, with no discernible posterior dominant rhythm with poor reactivity. There is mild bilateral beta activity. There is continuous diffuse delta and theta range background slowing.  There are rare generalized interictal epileptiform spike and wave discharges seen early in the recording improving later in the study.    A 2nd state is characterized by further background slowing and poorly formed sleep spindles.      Provocative maneuvers including hyperventilation and photic stimulation were not performed.     EKG recording shows a sinus rhythm.    There are numerous push-button patient events for various limb movements as well as the patient visibly trying to sit up in bed. No epileptiform discharges or electrographic seizures are seen.     IMPRESSION:  Abnormal study due to moderate  diffuse background slowing consistent with diffuse cerebral dysfunction and encephalopathy which may be on the basis of toxic, metabolic, or primary neuronal disorder.  Generalized interictal epileptiform discharges seen early in the recording are consistent with an underlying diffuse cortical irritability which may be seen as a toxic effect as well as in underlying generalized epilepsy.

## 2025-07-13 PROBLEM — E03.9 HYPOTHYROIDISM: Chronic | Status: ACTIVE | Noted: 2025-04-30

## 2025-07-13 PROBLEM — F25.9 SCHIZOAFFECTIVE DISORDER: Chronic | Status: ACTIVE | Noted: 2025-04-30

## 2025-07-13 PROBLEM — I10 HYPERTENSION: Chronic | Status: ACTIVE | Noted: 2025-04-30

## 2025-07-13 PROBLEM — E78.5 HYPERLIPIDEMIA: Chronic | Status: ACTIVE | Noted: 2025-04-30

## 2025-07-13 LAB
ABSOLUTE EOSINOPHIL (OHS): 0.09 K/UL
ABSOLUTE MONOCYTE (OHS): 0.83 K/UL (ref 0.3–1)
ABSOLUTE NEUTROPHIL COUNT (OHS): 12.12 K/UL (ref 1.8–7.7)
ALBUMIN SERPL BCP-MCNC: 3.9 G/DL (ref 3.5–5.2)
ALP SERPL-CCNC: 70 UNIT/L (ref 40–150)
ALT SERPL W/O P-5'-P-CCNC: 21 UNIT/L (ref 10–44)
ANION GAP (OHS): 11 MMOL/L (ref 8–16)
ANION GAP (OHS): 12 MMOL/L (ref 8–16)
ANION GAP (OHS): 13 MMOL/L (ref 8–16)
ANION GAP (OHS): 15 MMOL/L (ref 8–16)
AST SERPL-CCNC: 30 UNIT/L (ref 11–45)
BASOPHILS # BLD AUTO: 0.04 K/UL
BASOPHILS NFR BLD AUTO: 0.3 %
BILIRUB SERPL-MCNC: 1.5 MG/DL (ref 0.1–1)
BUN SERPL-MCNC: 12 MG/DL (ref 6–20)
BUN SERPL-MCNC: 14 MG/DL (ref 6–20)
BUN SERPL-MCNC: 15 MG/DL (ref 6–20)
BUN SERPL-MCNC: 15 MG/DL (ref 6–20)
CALCIUM SERPL-MCNC: 10.1 MG/DL (ref 8.7–10.5)
CALCIUM SERPL-MCNC: 8 MG/DL (ref 8.7–10.5)
CALCIUM SERPL-MCNC: 9.3 MG/DL (ref 8.7–10.5)
CALCIUM SERPL-MCNC: 9.4 MG/DL (ref 8.7–10.5)
CHLORIDE SERPL-SCNC: 102 MMOL/L (ref 95–110)
CHLORIDE SERPL-SCNC: 102 MMOL/L (ref 95–110)
CHLORIDE SERPL-SCNC: 104 MMOL/L (ref 95–110)
CHLORIDE SERPL-SCNC: 108 MMOL/L (ref 95–110)
CO2 SERPL-SCNC: 18 MMOL/L (ref 23–29)
CO2 SERPL-SCNC: 19 MMOL/L (ref 23–29)
CO2 SERPL-SCNC: 19 MMOL/L (ref 23–29)
CO2 SERPL-SCNC: 21 MMOL/L (ref 23–29)
CREAT SERPL-MCNC: 0.9 MG/DL (ref 0.5–1.4)
CREAT SERPL-MCNC: 1.1 MG/DL (ref 0.5–1.4)
ERYTHROCYTE [DISTWIDTH] IN BLOOD BY AUTOMATED COUNT: 12.5 % (ref 11.5–14.5)
GFR SERPLBLD CREATININE-BSD FMLA CKD-EPI: >60 ML/MIN/1.73/M2
GLUCOSE SERPL-MCNC: 103 MG/DL (ref 70–110)
GLUCOSE SERPL-MCNC: 105 MG/DL (ref 70–110)
GLUCOSE SERPL-MCNC: 147 MG/DL (ref 70–110)
GLUCOSE SERPL-MCNC: 153 MG/DL (ref 70–110)
HCT VFR BLD AUTO: 28.8 % (ref 40–54)
HGB BLD-MCNC: 9.5 GM/DL (ref 14–18)
IMM GRANULOCYTES # BLD AUTO: 0.12 K/UL (ref 0–0.04)
IMM GRANULOCYTES NFR BLD AUTO: 0.8 % (ref 0–0.5)
LYMPHOCYTES # BLD AUTO: 1.4 K/UL (ref 1–4.8)
MAGNESIUM SERPL-MCNC: 2.2 MG/DL (ref 1.6–2.6)
MCH RBC QN AUTO: 29.8 PG (ref 27–31)
MCHC RBC AUTO-ENTMCNC: 33 G/DL (ref 32–36)
MCV RBC AUTO: 90 FL (ref 82–98)
NUCLEATED RBC (/100WBC) (OHS): 0 /100 WBC
PHOSPHATE SERPL-MCNC: 2 MG/DL (ref 2.7–4.5)
PLATELET # BLD AUTO: 353 K/UL (ref 150–450)
PLATELET BLD QL SMEAR: NORMAL
PMV BLD AUTO: 10.7 FL (ref 9.2–12.9)
POTASSIUM SERPL-SCNC: 3.6 MMOL/L (ref 3.5–5.1)
POTASSIUM SERPL-SCNC: 3.7 MMOL/L (ref 3.5–5.1)
POTASSIUM SERPL-SCNC: 3.8 MMOL/L (ref 3.5–5.1)
POTASSIUM SERPL-SCNC: 4 MMOL/L (ref 3.5–5.1)
PROT SERPL-MCNC: 8.6 GM/DL (ref 6–8.4)
RBC # BLD AUTO: 3.19 M/UL (ref 4.6–6.2)
RELATIVE EOSINOPHIL (OHS): 0.6 %
RELATIVE LYMPHOCYTE (OHS): 9.6 % (ref 18–48)
RELATIVE MONOCYTE (OHS): 5.7 % (ref 4–15)
RELATIVE NEUTROPHIL (OHS): 83 % (ref 38–73)
SODIUM SERPL-SCNC: 134 MMOL/L (ref 136–145)
SODIUM SERPL-SCNC: 136 MMOL/L (ref 136–145)
SODIUM SERPL-SCNC: 136 MMOL/L (ref 136–145)
SODIUM SERPL-SCNC: 138 MMOL/L (ref 136–145)
VANCOMYCIN TROUGH SERPL-MCNC: 8.7 UG/ML (ref 10–22)
WBC # BLD AUTO: 14.6 K/UL (ref 3.9–12.7)

## 2025-07-13 PROCEDURE — 63600175 PHARM REV CODE 636 W HCPCS: Mod: HCNC | Performed by: INTERNAL MEDICINE

## 2025-07-13 PROCEDURE — 51702 INSERT TEMP BLADDER CATH: CPT | Mod: HCNC

## 2025-07-13 PROCEDURE — 94761 N-INVAS EAR/PLS OXIMETRY MLT: CPT | Mod: HCNC

## 2025-07-13 PROCEDURE — 20600001 HC STEP DOWN PRIVATE ROOM: Mod: HCNC

## 2025-07-13 PROCEDURE — 25000003 PHARM REV CODE 250: Mod: HCNC | Performed by: INTERNAL MEDICINE

## 2025-07-13 PROCEDURE — 83735 ASSAY OF MAGNESIUM: CPT | Mod: HCNC

## 2025-07-13 PROCEDURE — 80202 ASSAY OF VANCOMYCIN: CPT | Mod: HCNC | Performed by: INTERNAL MEDICINE

## 2025-07-13 PROCEDURE — 51798 US URINE CAPACITY MEASURE: CPT | Mod: HCNC

## 2025-07-13 PROCEDURE — 63600175 PHARM REV CODE 636 W HCPCS: Mod: HCNC

## 2025-07-13 PROCEDURE — 27000221 HC OXYGEN, UP TO 24 HOURS: Mod: HCNC

## 2025-07-13 PROCEDURE — 80053 COMPREHEN METABOLIC PANEL: CPT | Mod: HCNC

## 2025-07-13 PROCEDURE — 63600175 PHARM REV CODE 636 W HCPCS: Mod: HCNC | Performed by: NURSE PRACTITIONER

## 2025-07-13 PROCEDURE — 99900035 HC TECH TIME PER 15 MIN (STAT): Mod: HCNC

## 2025-07-13 PROCEDURE — 84100 ASSAY OF PHOSPHORUS: CPT | Mod: HCNC

## 2025-07-13 PROCEDURE — 99291 CRITICAL CARE FIRST HOUR: CPT | Mod: HCNC,,, | Performed by: INTERNAL MEDICINE

## 2025-07-13 PROCEDURE — 85025 COMPLETE CBC W/AUTO DIFF WBC: CPT | Mod: HCNC

## 2025-07-13 PROCEDURE — 25000003 PHARM REV CODE 250: Mod: HCNC | Performed by: GENERAL ACUTE CARE HOSPITAL

## 2025-07-13 PROCEDURE — 51701 INSERT BLADDER CATHETER: CPT | Mod: HCNC

## 2025-07-13 RX ORDER — VANCOMYCIN 2 GRAM/500 ML IN 0.9 % SODIUM CHLORIDE INTRAVENOUS
2000
Status: DISCONTINUED | OUTPATIENT
Start: 2025-07-13 | End: 2025-07-14

## 2025-07-13 RX ORDER — LISINOPRIL 20 MG/1
20 TABLET ORAL DAILY
Status: DISCONTINUED | OUTPATIENT
Start: 2025-07-13 | End: 2025-07-15 | Stop reason: HOSPADM

## 2025-07-13 RX ORDER — CLONAZEPAM 0.5 MG/1
4 TABLET ORAL NIGHTLY
Status: DISCONTINUED | OUTPATIENT
Start: 2025-07-13 | End: 2025-07-15 | Stop reason: HOSPADM

## 2025-07-13 RX ORDER — ACETAMINOPHEN 325 MG/1
650 TABLET ORAL EVERY 6 HOURS PRN
Status: DISCONTINUED | OUTPATIENT
Start: 2025-07-13 | End: 2025-07-14

## 2025-07-13 RX ORDER — MUPIROCIN 20 MG/G
OINTMENT TOPICAL 2 TIMES DAILY
Status: DISCONTINUED | OUTPATIENT
Start: 2025-07-13 | End: 2025-07-15 | Stop reason: HOSPADM

## 2025-07-13 RX ORDER — CLONAZEPAM 0.5 MG/1
2 TABLET ORAL DAILY
Status: DISCONTINUED | OUTPATIENT
Start: 2025-07-14 | End: 2025-07-15 | Stop reason: HOSPADM

## 2025-07-13 RX ADMIN — CLONAZEPAM 4 MG: 0.5 TABLET ORAL at 09:07

## 2025-07-13 RX ADMIN — MUPIROCIN: 20 OINTMENT TOPICAL at 09:07

## 2025-07-13 RX ADMIN — SODIUM CHLORIDE, POTASSIUM CHLORIDE, SODIUM LACTATE AND CALCIUM CHLORIDE 1000 ML: 600; 310; 30; 20 INJECTION, SOLUTION INTRAVENOUS at 06:07

## 2025-07-13 RX ADMIN — PIPERACILLIN SODIUM AND TAZOBACTAM SODIUM 4.5 G: 4; .5 INJECTION, POWDER, FOR SOLUTION INTRAVENOUS at 02:07

## 2025-07-13 RX ADMIN — ENOXAPARIN SODIUM 40 MG: 40 INJECTION SUBCUTANEOUS at 04:07

## 2025-07-13 RX ADMIN — LEVOTHYROXINE SODIUM 75 MCG: 75 TABLET ORAL at 05:07

## 2025-07-13 RX ADMIN — DIAZEPAM 5 MG: 10 INJECTION, SOLUTION INTRAMUSCULAR; INTRAVENOUS at 05:07

## 2025-07-13 RX ADMIN — LISINOPRIL 20 MG: 20 TABLET ORAL at 04:07

## 2025-07-13 RX ADMIN — ACETAMINOPHEN 650 MG: 325 TABLET ORAL at 01:07

## 2025-07-13 RX ADMIN — DIAZEPAM 5 MG: 10 INJECTION, SOLUTION INTRAMUSCULAR; INTRAVENOUS at 01:07

## 2025-07-13 RX ADMIN — PIPERACILLIN SODIUM AND TAZOBACTAM SODIUM 4.5 G: 4; .5 INJECTION, POWDER, FOR SOLUTION INTRAVENOUS at 08:07

## 2025-07-13 RX ADMIN — PIPERACILLIN SODIUM AND TAZOBACTAM SODIUM 4.5 G: 4; .5 INJECTION, POWDER, FOR SOLUTION INTRAVENOUS at 05:07

## 2025-07-13 RX ADMIN — VANCOMYCIN HYDROCHLORIDE 2000 MG: 500 INJECTION, POWDER, LYOPHILIZED, FOR SOLUTION INTRAVENOUS at 05:07

## 2025-07-13 RX ADMIN — POTASSIUM PHOSPHATE, MONOBASIC AND POTASSIUM PHOSPHATE, DIBASIC 20 MMOL: 224; 236 INJECTION, SOLUTION, CONCENTRATE INTRAVENOUS at 01:07

## 2025-07-13 RX ADMIN — VANCOMYCIN HYDROCHLORIDE 2000 MG: 500 INJECTION, POWDER, LYOPHILIZED, FOR SOLUTION INTRAVENOUS at 06:07

## 2025-07-13 RX ADMIN — DIAZEPAM 5 MG: 10 INJECTION, SOLUTION INTRAMUSCULAR; INTRAVENOUS at 09:07

## 2025-07-13 NOTE — PLAN OF CARE
07/13/25 0738   Discharge Plan   Discharge Plan A Psychiatric hospital;Other  (TBD)   Discharge Plan B Other  (TBD)     Discharge Plan A and Plan B have been determined by review of patient's clinical status, future medical and therapeutic needs, and coverage/benefits for post-acute care in coordination with multidisciplinary team members.    Nikki Santana RN  Weekend  - [facility] [place] Tarik rickey  [id number]

## 2025-07-13 NOTE — PLAN OF CARE
MICU DAILY GOALS     Family/Goals of care/Code Status   Code Status: Full Code    24H Vital Sign Range  Temp:  [99.6 °F (37.6 °C)-101.5 °F (38.6 °C)]   Pulse:  [113-131]   Resp:  [22-46]   BP: (143-187)/()   SpO2:  [89 %-100 %]      Shift Events (include procedures and significant events)   No acute events throughout shift    AWAKE RASS: Goal -    Actual - RASS (Marquez Agitation-Sedation Scale): alert and calm    Restraint necessity: Not necessary   BREATHE SBT: Not intubated    Coordinate A & B, analgesics/sedatives Pain: managed   SAT: Not intubated   Delirium CAM-ICU:     Early(intubated/ Progressive (non-intubated) Mobility MOVE Screen (INTUBATED ONLY): Not intubated    Activity: Activity Management: Rolling - L1   Feeding/Nutrition Diet order: Diet/Nutrition Received: regular,     Thrombus DVT prophylaxis: VTE Core Measure: Pharmacological prophylaxis initiated/maintained   HOB Elevation Head of Bed (HOB) Positioning: HOB at 30-45 degrees   Ulcer Prophylaxis GI: yes   Glucose control managed     Skin Skin assessment:     Sacrum intact/not altered? Yes  Heels intact/not altered? Yes  Surgical wound? No    CHECK ONE!   (no altered skin or altered skin) and sub boxes:  [x] No Altered Skin Integrity Present    []Prevention Measures Documented    [] Altered Skin Integrity Present or Discovered   [] LDA already present in EPIC, daily doc completed              [] LDA added if not already in EPIC (describe/stage wound).               [] Wound Image Taken (required on admit,                   transfer/discharge and every Tuesday)    Wound Care Consulted? No   Bowel Function no issues    Indwelling Catheter Necessity      Urethral Catheter 07/13/25 1103-Reason for Continuing Urinary Catheterization: Urinary retention  [REMOVED]      Urethral Catheter 07/09/25 2130 Temperature probe 16 Fr.-Reason for Continuing Urinary Catheterization: Critically ill in ICU and requiring hourly monitoring of intake/output           De-escalation Antibiotics No        VS and assessment per flow sheet, patient progressing towards goals as tolerated, plan of care reviewed with family/patient, all concerns addressed, will continue to monitor.

## 2025-07-13 NOTE — PROGRESS NOTES
Pharmacokinetic Assessment Follow Up: IV Vancomycin    Vancomycin serum concentration assessment/plan:  Trough resulted as 8.7 mcg/mL; Goal 10-20 mcg/mL, empiric coverage   Renal function stable  Change regimen to Vancomycin 2000 mg IV q12h  Next level to be drawn on 7/15/25 at 0530    Drug levels (last 3 results):  Recent Labs   Lab Result Units 07/13/25  0515   Vancomycin Trough ug/ml 8.7*       Pharmacy will continue to follow and monitor vancomycin.    Please contact pharmacy at extension [phone] for questions regarding this assessment.    Thank you for the consult,   Maida Desai       Patient brief summary:  Ravi Ibanez is a 44 y.o. male initiated on antimicrobial therapy with IV Vancomycin for treatment of empiric coverage    The patient's current regimen is 2000 mg Q12H    Drug Allergies:   Review of patient's allergies indicates:  No Known Allergies    Actual Body Weight:   96 kg     Renal Function:   Estimated Creatinine Clearance: 103.4 mL/min (based on SCr of 1.1 mg/dL).,     Dialysis Method (if applicable):  N/A    CBC (last 72 hours):  Recent Labs   Lab Result Units 07/11/25  0359 07/12/25  0506   WBC K/uL 8.79 7.91   HGB gm/dL 12.7* 12.4*   HCT % 37.6* 37.7*   Platelet Count K/uL 185 212   Lymph % % 14.7* 7.8*   Mono % % 4.6 3.2*   Eos % % 1.6 0.3   Basophil % % 0.3 0.3       Metabolic Panel (last 72 hours):  Recent Labs   Lab Result Units 07/10/25  0938 07/10/25  1643 07/11/25  0011 07/11/25  0359 07/11/25  0800 07/11/25  1545 07/11/25  1641 07/11/25  2317 07/12/25  0506 07/12/25  0812 07/12/25  1848 07/13/25  0049 07/13/25  0515   Sodium mmol/L 136 139 135* 135* 136 135*  --  133* 135* 135* 135* 138 136   Potassium mmol/L 3.5 4.5 4.6 4.5 4.3 4.4  --  4.6 4.5 4.4 4.1 3.6 4.0   Chloride mmol/L 106 106 107 107 105 106  --  104 106 105 104 108 102   CO2 mmol/L 22* 24 21* 22* 24 18*  --  21* 21* 21* 21* 18* 19*   Glucose mg/dL 108 98 92 92 91 140*  --  116* 109 104 106 103 147*   Glucose, UA    --   --   --   --   --   --  4+*  --   --   --   --   --   --    BUN mg/dL 11 19 9 10 9 11  --  14 12 12 12 12 15   Creatinine mg/dL 1.0 1.1 0.9 0.9 1.0 0.9  --  1.0 1.0 1.0 0.9 0.9 1.1   Albumin g/dL 3.7  --   --  3.5  --   --   --   --  3.4*  --   --   --  3.9   Bilirubin Total mg/dL 0.7  --   --  1.0  --   --   --   --  1.6*  --   --   --  1.5*   ALP unit/L 41  --   --  47  --   --   --   --  53  --   --   --  70   AST unit/L 17  --   --  18  --   --   --   --  23  --   --   --  30   ALT unit/L 22  --   --  19  --   --   --   --  18  --   --   --  21   Magnesium  mg/dL  --   --   --  2.9*  --   --   --   --  2.0  --   --   --  2.2   Phosphorus Level mg/dL  --   --   --  2.3*  --   --   --   --  2.8  --   --   --  2.0*       Vancomycin Administrations:  vancomycin given in the last 96 hours                     vancomycin 1750 mg in 0.9% sodium chloride 500 mL IVPB (mg) 1,750 mg New Bag 07/12/25 1752     1,750 mg New Bag  0549    vancomycin (VANCOCIN) 2,250 mg in 0.9% NaCl 500 mL IVPB (mg) 2,250 mg New Bag 07/11/25 2040                    Microbiologic Results:  Microbiology Results (last 7 days)       Procedure Component Value Units Date/Time    Blood culture [[id number]]  (Normal) Collected: 07/09/25 2226    Order Status: Completed Specimen: Blood from Peripheral, Hand, Right Updated: 07/12/25 2300     Blood Culture No Growth After 72 Hours    Blood culture [[id number]]  (Normal) Collected: 07/09/25 2226    Order Status: Completed Specimen: Blood from Peripheral, Hand, Left Updated: 07/12/25 2300     Blood Culture No Growth After 72 Hours    Culture, Respiratory with Gram Stain [[id number]] Collected: 07/12/25 1116    Order Status: Sent Specimen: Respiratory from Sputum Updated: 07/12/25 1703    Blood culture [[id number]] Collected: 07/12/25 0940    Order Status: Sent Specimen: Blood from Peripheral, Forearm, Right Updated: 07/12/25 1134    Respiratory Infection Panel (PCR), Nasopharyngeal [[id number]] Collected:  07/11/25 1747    Order Status: Completed Specimen: Nasopharyngeal Swab Updated: 07/11/25 2014     Respiratory Infection Panel Source Nasopharyngeal Swab     Adenovirus Not Detected     Coronavirus 229E, Common Cold Virus Not Detected     Coronavirus HKU1, Common Cold Virus Not Detected     Coronavirus NL63, Common Cold Virus Not Detected     Coronavirus OC43, Common Cold Virus Not Detected     SARS-CoV2 (COVID-19) Qualitative PCR Not Detected     Human Metapneumovirus Not Detected     Human Rhinovirus/Enterovirus Not Detected     Influenza A Not Detected     Influenza B Not Detected     Parainfluenza Virus 1 Not Detected     Parainfluenza Virus 2 Not Detected     Parainfluenza Virus 3 Not Detected     Parainfluenza Virus 4 Not Detected     Respiratory Syncytial Virus Not Detected     Bordetella Parapertussis (WV7570) Not Detected     Bordetella pertussis (ptxP) Not Detected     Chlamydia pneumoniae Not Detected     Mycoplasma pneumoniae Not Detected    Blood culture [[id number]] Collected: 07/11/25 1641    Order Status: Sent Specimen: Blood from Peripheral, Hand, Left Updated: 07/11/25 1755    Blood culture [[id number]] Collected: 07/11/25 1641    Order Status: Sent Specimen: Blood from Peripheral, Hand, Left Updated: 07/11/25 1755    Blood culture [[id number]]     Order Status: Sent Specimen: Blood     MRSA Screen by PCR [[id number]]  (Normal) Collected: 07/10/25 0004    Order Status: Completed Specimen: Nasal Swab Updated: 07/10/25 1200     MRSA PCR Screen Not Detected

## 2025-07-13 NOTE — PLAN OF CARE
Hospital Medicine Team S  ICU stepdown acceptance note    Ravi Ibanez is a 44 year old white  man with hypertension, hyperlipidemia, hypothyroidism, schizoaffective disorder, former cigarette smoking, marijuana use, history of tonsillectomy on 6/1/2021. He lives in [place], [place]. His primary care physician is Dr. Dk Diaz.   He presented to [facility] Emergency [facility] on 7/9/2025 via emergency medical services for altered mental status and suspected suicide attempt. His father stated that he mentioned feeling depressed and suicidal several days ago. His father when to check on him after not hearing from him all day and found him obtunded next to an empty bottle of quetiapine. He was tachycardic with a [place] coma sacle of 4. EKG showed QTc prolongation. Urine was positive for benzodiazepines and cannabis. Labs showed hypokalemia. Head CT showed no acute intracranial pathology. Blood gas showed pH 7.32 and pCO2 38.8. lactate was 4.2 mmol/L. He was intubated for airway protection. Poison control was contacted and recommended supportive care and monitoring for anticholinergic poisoning. He was admitted to Critical Care Medicine.    He was extubated on 7/11/2025. He developed fevers in the evening of 7/12/2025. He was started on piperacillin-tazobactam and vancomycin.    CT Head Without Contrast 7/9/25: FINDINGS:   The brain parenchyma appears normal for age with good corticomedullary differentiation.  There is no evidence of acute infarct, hemorrhage, or mass.  The ventricular system is normal in size.  No mass-effect or midline shift.  There are no abnormal extra-axial fluid collections.  Partial opacification posterior ethmoid air cells.  Remaining visualized paranasal sinuses and mastoid air cells are clear.  The calvarium appears intact. Partially visualized nasal pharyngeal tube in the right nasopharynx.   Impression: No acute intracranial abnormalities.    Partially visualized tube in the right nasopharynx.   X-Ray Chest 1 View 7/10/25: FINDINGS:   There is an endotracheal tube with the tip approximately 6 cm proximal to the nehal.  There is a nasogastric tube with the tip in the stomach and the side port at the gastroesophageal junction.  The lungs are well expanded and clear. No focal opacities are seen. The pleural spaces are clear. The cardiac silhouette is unremarkable. The visualized osseous structures are unremarkable.   X-Ray Chest AP Portable 7/12/25: FINDINGS:   The left lung is clear.  There is bilateral perihilar and basilar opacity suspicious for infection.  There is no pneumothorax or pleural fluid.  The cardiac silhouette is not enlarged.  The osseous structures are unremarkable.

## 2025-07-13 NOTE — ASSESSMENT & PLAN NOTE
Managed by psychiatry outpatient.    Home Regimen:  Clonazepam 1mg TID   Lamotrigine 200 QD  Risperidone 3mg QD  Quetiapine 300mg BID    Plan:  - Psychiatry consult once extubated   - Holding all psych meds in the setting of overdose   - Lamotrigine level pending  - PEC--> CEC

## 2025-07-13 NOTE — PROGRESS NOTES
[facility] - Medical ICU  Critical Care Medicine  Progress Note    Patient Name: Ravi Ibanez  MRN: [id number]  Admission Date: 7/9/2025  Hospital Length of Stay: 3 days  Code Status: Full Code  Attending Provider: Joni Espino*  Primary Care Provider: Dk Diaz MD   Principal Problem: Overdose of antipsychotic    Subjective:     HPI:  Mr. Ravi Ibanez is a 45 y/o male with Schizoaffective disorder, hypothyroidism, hypertension and hyperlipidemia who presented to [facility] ED via EMS with concerns for AMS and suspected suicide attempt. History obtained from chart review. Patient father stated that several days ago patient mentioned feeling depressed and suicidal. His father went to check on him after not hearing from him all day and found him obtunded next to an empty bottle of Seroquel. Unclear of how much time passed between suspected overdose and when he was found.   In the ED patient is obtunded and tachycardic with a GCS 4. EKG with Qtc prolongation. UA +Benzos and Cannabis, hypokalemic. Glucose, LFTs, TSH and B12 WNL. CT head without acute intracranial pathologies. 7.32/38.8 with a POC Lactate of 4.2. Patient is intubated in the ED for airway protection. Poison control was contacted given concerns for Seroquel ingestion, recommended supportive care and monitoring for anticholinergic poisoning. Toxicology consulted.     Critical Care consulted for management of respiratory failure in the setting of metabolic encephalopathy.          Hospital/ICU Course:  07/11/25: No SZ activity noted on EEG. Passes SAT/SBT --> extubated.  07/12/25: Fever late evening, work-up and abx initiated yesterday    Interval Hx: Fever that started late yesterday. Work-up and Abx initiated    Review of Systems   Unable to perform ROS: Intubated     Objective:     Vital Signs (Most Recent):  Temp: 100.1 °F (37.8 °C) (07/12/25 1505)  Pulse: (!) 118 (07/12/25 2000)  Resp: (!) 32 (07/12/25 2000)  BP: (!) 153/96 (07/12/25  2000)  SpO2: 96 % (07/12/25 2000) Vital Signs (24h Range):  Temp:  [99 °F (37.2 °C)-102.4 °F (39.1 °C)] 100.1 °F (37.8 °C)  Pulse:  [102-123] 118  Resp:  [16-32] 32  SpO2:  [92 %-100 %] 96 %  BP: (118-202)/() 153/96   Weight: 96 kg (211 lb 10.3 oz)  Body mass index is 29.52 kg/m².      Intake/Output Summary (Last 24 hours) at 7/12/2025 2009  Last data filed at 7/12/2025 1800  Gross per 24 hour   Intake 1381.39 ml   Output 3050 ml   Net -1668.61 ml          Physical Exam  Vitals and nursing note reviewed.   Constitutional:       General: He is not in acute distress.     Appearance: He is not ill-appearing.   HENT:      Head: Normocephalic.   Eyes:      General: No scleral icterus.     Extraocular Movements:      Right eye: No nystagmus.      Left eye: No nystagmus.      Pupils: Pupils are equal, round, and reactive to light.      Comments: Sluggish pupils bilateral   Cardiovascular:      Rate and Rhythm: Regular rhythm. Tachycardia present.      Heart sounds: Normal heart sounds. No murmur heard.     No friction rub. No gallop.   Pulmonary:      Effort: No respiratory distress.      Breath sounds: No rhonchi or rales.      Comments: Intubated, 30/5  Abdominal:      General: There is no distension.      Palpations: Abdomen is soft.      Tenderness: There is no guarding or rebound.   Musculoskeletal:      Right lower leg: No edema.      Left lower leg: No edema.   Skin:     General: Skin is warm.      Capillary Refill: Capillary refill takes less than 2 seconds.      Coloration: Skin is not jaundiced.      Findings: No bruising.   Neurological:      GCS: GCS eye subscore is 1. GCS verbal subscore is 1. GCS motor subscore is 1.      Coordination: Coordination abnormal.      Deep Tendon Reflexes: Reflexes abnormal (improved).      Comments: Ocular clonus, hyperreflexia, chorea       Vents:  Vent Mode: A/C (07/11/25 1510)  Ventilator Initiated: Yes (07/10/25 0030)  Set Rate: 14 BPM (07/11/25 1510)  Vt Set: 450 mL  (07/10/25 0704)  PEEP/CPAP: 5 cmH20 (07/11/25 1510)  Oxygen Concentration (%): 28 (07/12/25 0031)  Peak Airway Pressure: 14 cmH20 (07/11/25 1510)  Plateau Pressure: 0 cmH20 (07/11/25 1510)  Total Ve: 14.8 L/m (07/11/25 1510)  Negative Inspiratory Force (cm H2O): 0 (07/11/25 1510)  F/VT Ratio<105 (RSBI): (!) 47.41 (07/11/25 1510)  Lines/Drains/Airways       Drain  Duration             Male External Urinary Catheter 07/12/25 0600 Medium <1 day              Peripheral Intravenous Line  Duration             Peripheral IV Single Lumen 07/09/25 2119 18 G Left Antecubital 2 days    Peripheral IV Single Lumen 07/09/25 2120 20 G Posterior;Right Hand 2 days    Peripheral IV Single Lumen 07/09/25 2138 20 G Right Antecubital 2 days                  Significant Labs:    CBC/Anemia Profile:  Recent Labs   Lab 07/11/25  0359 07/12/25  0506   WBC 8.79 7.91   HGB 12.7* 12.4*   HCT 37.6* 37.7*    212   MCV 91 90   RDW 12.8 12.4        Chemistries:  Recent Labs   Lab 07/11/25  0359 07/11/25  0800 07/12/25  0506 07/12/25  0812 07/12/25  1848   *   < > 135* 135* 135*   K 4.5   < > 4.5 4.4 4.1      < > 106 105 104   CO2 22*   < > 21* 21* 21*   BUN 10   < > 12 12 12   CREATININE 0.9   < > 1.0 1.0 0.9   CALCIUM 8.6*   < > 8.9 8.8 9.3   ALBUMIN 3.5  --  3.4*  --   --    PROT 6.1  --  6.7  --   --    BILITOT 1.0  --  1.6*  --   --    ALKPHOS 47  --  53  --   --    ALT 19  --  18  --   --    AST 18  --  23  --   --    MG 2.9*  --  2.0  --   --    PHOS 2.3*  --  2.8  --   --     < > = values in this interval not displayed.       All pertinent labs within the past 24 hours have been reviewed.    Significant Imaging: I have reviewed all pertinent imaging results/findings within the past 24 hours.    ABG  Recent Labs   Lab 07/09/25  2356   PH 7.297*   PO2 52   PCO2 44.5   HCO3 21.7*   BE -5*     Assessment/Plan:     Neuro  Acute metabolic encephalopathy  VBG 7.323/39.8.   CTH negative for acute intracranial abnormalities.   UDS  positive for benzo/THC.   Patient intubated in ED for airway protection. Concern for overdose 2/2 ingestion of prescribed Seroquel as empty prescription bottle was found at bedside per family. Poison control contacted. IV Magnesium administered in ED for Qtc prolongation. Admitted to MICU for HLOC.     Plan:   - Medical toxicology consulted, appreciate recs  - F/U UDS & Lamotrigine level  - Psycyhiatry consult in the setting of suicidal attempt once patient is awake and extubated    - PEC--> CEC      Psychiatric  Drug overdose, intentional  PEC and Psych consult after extubation    Schizoaffective disorder  Managed by psychiatry outpatient.    Home Regimen:  Clonazepam 1mg TID   Lamotrigine 200 QD  Risperidone 3mg QD  Quetiapine 300mg BID    Plan:  - Psychiatry consult once extubated   - Holding all psych meds in the setting of overdose   - Lamotrigine level pending  - PEC--> CEC      Pulmonary  On mechanically assisted ventilation  Patient presented to [facility] ED following likely drug overdose with seroquel with GCS of 4, he was intubated in the ED for airway protection.     Supplemental oxygen was provided and noted-   Oxygen Concentration (%):  [28] 28    Plan:  - Follow up infectious workup   - Titrate Fio2 as able for SpO2 >90%  - Nutrition consented for tube feed recs   - - SAT/SBT Daily: passed and extubated 07/11/25      Cardiac/Vascular  Hyperlipidemia  Home regimen: Fenofibrate 160mg     Plan:  - Resume when appropriate   - Follow up TAGs    Hypertension  Home regimen: Lisinopril 20mg     Plan:   - Resume home regimen when appropriate     Renal/  Lactic acid acidosis  resolved    Hypokalemia  Patient's most recent potassium results are listed below.   Recent Labs     07/12/25  0506 07/12/25  0812 07/12/25  1848   K 4.5 4.4 4.1       Plan:  - Replete potassium PRN per protocol      Endocrine  Hypothyroidism  Home regimen: Synthroid 75mg   TSH was 0.72    Plan:  - Continue home Levothyroxine     Other  *  Overdose of antipsychotic  See above and appreciate Medical Tox consult    Fever  - -infection work up initiated. -- Follow Cx  -- Suspect Aspiration  -- Abx provided  -- Seems high for a central fever  -- Unclear if etoh or benzo withdrawal      Critical Care Time: 35 minutes  Critical secondary to Patient has a condition that poses threat to life and bodily function: Psychiatric Illness with threat to self and others  Patient has an abrupt change in neurologic status: metabolic encephalopathy      Critical care was time spent personally by me on the following activities: development of treatment plan with patient or surrogate and bedside caregivers, discussions with consultants, evaluation of patient's response to treatment, examination of patient, ordering and performing treatments and interventions, ordering and review of laboratory studies, ordering and review of radiographic studies, pulse oximetry, re-evaluation of patient's condition. This critical care time did not overlap with that of any other provider or involve time for any procedures.     Joni Espino MD  Critical Care Medicine  [facility] - Medical ICU

## 2025-07-13 NOTE — ASSESSMENT & PLAN NOTE
Patient's most recent potassium results are listed below.   Recent Labs     07/12/25  0506 07/12/25  0812 07/12/25  1848   K 4.5 4.4 4.1       Plan:  - Replete potassium PRN per protocol

## 2025-07-13 NOTE — ASSESSMENT & PLAN NOTE
VBG 7.323/39.8.   CTH negative for acute intracranial abnormalities.   UDS positive for benzo/THC.   Patient intubated in ED for airway protection. Concern for overdose 2/2 ingestion of prescribed Seroquel as empty prescription bottle was found at bedside per family. Poison control contacted. IV Magnesium administered in ED for Qtc prolongation. Admitted to MICU for HLOC.     Plan:   - Medical toxicology consulted, appreciate recs  - F/U UDS & Lamotrigine level  - Psycyhiatry consult in the setting of suicidal attempt once patient is awake and extubated    - PEC--> CEC

## 2025-07-13 NOTE — SUBJECTIVE & OBJECTIVE
Interval Hx: Fever that started late yesterday. Work-up and Abx initiated    Review of Systems   Unable to perform ROS: Intubated     Objective:     Vital Signs (Most Recent):  Temp: 100.1 °F (37.8 °C) (07/12/25 1505)  Pulse: (!) 118 (07/12/25 2000)  Resp: (!) 32 (07/12/25 2000)  BP: (!) 153/96 (07/12/25 2000)  SpO2: 96 % (07/12/25 2000) Vital Signs (24h Range):  Temp:  [99 °F (37.2 °C)-102.4 °F (39.1 °C)] 100.1 °F (37.8 °C)  Pulse:  [102-123] 118  Resp:  [16-32] 32  SpO2:  [92 %-100 %] 96 %  BP: (118-202)/() 153/96   Weight: 96 kg (211 lb 10.3 oz)  Body mass index is 29.52 kg/m².      Intake/Output Summary (Last 24 hours) at 7/12/2025 2009  Last data filed at 7/12/2025 1800  Gross per 24 hour   Intake 1381.39 ml   Output 3050 ml   Net -1668.61 ml          Physical Exam  Vitals and nursing note reviewed.   Constitutional:       General: He is not in acute distress.     Appearance: He is not ill-appearing.   HENT:      Head: Normocephalic.   Eyes:      General: No scleral icterus.     Extraocular Movements:      Right eye: No nystagmus.      Left eye: No nystagmus.      Pupils: Pupils are equal, round, and reactive to light.      Comments: Sluggish pupils bilateral   Cardiovascular:      Rate and Rhythm: Regular rhythm. Tachycardia present.      Heart sounds: Normal heart sounds. No murmur heard.     No friction rub. No gallop.   Pulmonary:      Effort: No respiratory distress.      Breath sounds: No rhonchi or rales.      Comments: Intubated, 30/5  Abdominal:      General: There is no distension.      Palpations: Abdomen is soft.      Tenderness: There is no guarding or rebound.   Musculoskeletal:      Right lower leg: No edema.      Left lower leg: No edema.   Skin:     General: Skin is warm.      Capillary Refill: Capillary refill takes less than 2 seconds.      Coloration: Skin is not jaundiced.      Findings: No bruising.   Neurological:      GCS: GCS eye subscore is 1. GCS verbal subscore is 1. GCS motor  subscore is 1.      Coordination: Coordination abnormal.      Deep Tendon Reflexes: Reflexes abnormal (improved).      Comments: Ocular clonus, hyperreflexia, chorea       Vents:  Vent Mode: A/C (07/11/25 1510)  Ventilator Initiated: Yes (07/10/25 0030)  Set Rate: 14 BPM (07/11/25 1510)  Vt Set: 450 mL (07/10/25 0704)  PEEP/CPAP: 5 cmH20 (07/11/25 1510)  Oxygen Concentration (%): 28 (07/12/25 0031)  Peak Airway Pressure: 14 cmH20 (07/11/25 1510)  Plateau Pressure: 0 cmH20 (07/11/25 1510)  Total Ve: 14.8 L/m (07/11/25 1510)  Negative Inspiratory Force (cm H2O): 0 (07/11/25 1510)  F/VT Ratio<105 (RSBI): (!) 47.41 (07/11/25 1510)  Lines/Drains/Airways       Drain  Duration             Male External Urinary Catheter 07/12/25 0600 Medium <1 day              Peripheral Intravenous Line  Duration             Peripheral IV Single Lumen 07/09/25 2119 18 G Left Antecubital 2 days    Peripheral IV Single Lumen 07/09/25 2120 20 G Posterior;Right Hand 2 days    Peripheral IV Single Lumen 07/09/25 2138 20 G Right Antecubital 2 days                  Significant Labs:    CBC/Anemia Profile:  Recent Labs   Lab 07/11/25  0359 07/12/25  0506   WBC 8.79 7.91   HGB 12.7* 12.4*   HCT 37.6* 37.7*    212   MCV 91 90   RDW 12.8 12.4        Chemistries:  Recent Labs   Lab 07/11/25  0359 07/11/25  0800 07/12/25  0506 07/12/25  0812 07/12/25  1848   *   < > 135* 135* 135*   K 4.5   < > 4.5 4.4 4.1      < > 106 105 104   CO2 22*   < > 21* 21* 21*   BUN 10   < > 12 12 12   CREATININE 0.9   < > 1.0 1.0 0.9   CALCIUM 8.6*   < > 8.9 8.8 9.3   ALBUMIN 3.5  --  3.4*  --   --    PROT 6.1  --  6.7  --   --    BILITOT 1.0  --  1.6*  --   --    ALKPHOS 47  --  53  --   --    ALT 19  --  18  --   --    AST 18  --  23  --   --    MG 2.9*  --  2.0  --   --    PHOS 2.3*  --  2.8  --   --     < > = values in this interval not displayed.       All pertinent labs within the past 24 hours have been reviewed.    Significant Imaging: I have  reviewed all pertinent imaging results/findings within the past 24 hours.

## 2025-07-13 NOTE — ASSESSMENT & PLAN NOTE
Patient presented to [facility] ED following likely drug overdose with seroquel with GCS of 4, he was intubated in the ED for airway protection.     Supplemental oxygen was provided and noted-   Oxygen Concentration (%):  [28] 28    Plan:  - Follow up infectious workup   - Titrate Fio2 as able for SpO2 >90%  - Nutrition consented for tube feed recs   - - SAT/SBT Daily: passed and extubated 07/11/25

## 2025-07-13 NOTE — EICU
Virtual ICU Quality Rounds    Admit Date: 7/9/2025  Hospital Day: 4    ICU Day: 3d 15h    24H Vital Sign Range:  Temp:  [99.6 °F (37.6 °C)-101.5 °F (38.6 °C)]   Pulse:  [117-131]   Resp:  [26-46]   BP: (133-202)/()   SpO2:  [89 %-100 %]     VICU Surveillance Screening    Daily review of  line necessity(optional): Urinary catheter in place            Webb Indications : Urinary retention    LDA reconciliation : Yes

## 2025-07-13 NOTE — ASSESSMENT & PLAN NOTE
- -infection work up initiated. -- Follow Cx  -- Suspect Aspiration  -- Abx provided  -- Seems high for a central fever  -- Unclear if etoh or benzo withdrawal

## 2025-07-14 PROBLEM — E87.20 LACTIC ACID ACIDOSIS: Status: RESOLVED | Noted: 2025-07-09 | Resolved: 2025-07-14

## 2025-07-14 PROBLEM — E87.6 HYPOKALEMIA: Status: RESOLVED | Noted: 2025-07-09 | Resolved: 2025-07-14

## 2025-07-14 PROBLEM — R41.82 AMS (ALTERED MENTAL STATUS): Status: ACTIVE | Noted: 2025-07-14

## 2025-07-14 PROBLEM — Z99.11 ON MECHANICALLY ASSISTED VENTILATION: Status: RESOLVED | Noted: 2025-07-09 | Resolved: 2025-07-14

## 2025-07-14 PROBLEM — J18.9 PNEUMONIA OF BOTH LOWER LOBES: Status: ACTIVE | Noted: 2025-07-12

## 2025-07-14 LAB
BACTERIA BLD CULT: NORMAL
BACTERIA BLD CULT: NORMAL

## 2025-07-14 PROCEDURE — 63600175 PHARM REV CODE 636 W HCPCS: Mod: HCNC | Performed by: INTERNAL MEDICINE

## 2025-07-14 PROCEDURE — 25000003 PHARM REV CODE 250: Mod: HCNC | Performed by: INTERNAL MEDICINE

## 2025-07-14 PROCEDURE — 20600001 HC STEP DOWN PRIVATE ROOM: Mod: HCNC

## 2025-07-14 PROCEDURE — 63600175 PHARM REV CODE 636 W HCPCS: Mod: HCNC | Performed by: HOSPITALIST

## 2025-07-14 PROCEDURE — 25000003 PHARM REV CODE 250: Mod: HCNC | Performed by: HOSPITALIST

## 2025-07-14 PROCEDURE — 63600175 PHARM REV CODE 636 W HCPCS: Mod: HCNC

## 2025-07-14 PROCEDURE — 99223 1ST HOSP IP/OBS HIGH 75: CPT | Mod: HCNC,,,

## 2025-07-14 RX ORDER — ACETAMINOPHEN 325 MG/1
650 TABLET ORAL EVERY 6 HOURS PRN
Status: DISCONTINUED | OUTPATIENT
Start: 2025-07-14 | End: 2025-07-15 | Stop reason: HOSPADM

## 2025-07-14 RX ORDER — BISACODYL 5 MG
5 TABLET, DELAYED RELEASE (ENTERIC COATED) ORAL DAILY PRN
Status: DISCONTINUED | OUTPATIENT
Start: 2025-07-14 | End: 2025-07-15 | Stop reason: HOSPADM

## 2025-07-14 RX ORDER — ONDANSETRON 8 MG/1
8 TABLET, ORALLY DISINTEGRATING ORAL EVERY 8 HOURS PRN
Status: DISCONTINUED | OUTPATIENT
Start: 2025-07-14 | End: 2025-07-15 | Stop reason: HOSPADM

## 2025-07-14 RX ADMIN — MUPIROCIN: 20 OINTMENT TOPICAL at 08:07

## 2025-07-14 RX ADMIN — LISINOPRIL 20 MG: 20 TABLET ORAL at 06:07

## 2025-07-14 RX ADMIN — PIPERACILLIN SODIUM AND TAZOBACTAM SODIUM 4.5 G: 4; .5 INJECTION, POWDER, LYOPHILIZED, FOR SOLUTION INTRAVENOUS at 06:07

## 2025-07-14 RX ADMIN — MUPIROCIN: 20 OINTMENT TOPICAL at 12:07

## 2025-07-14 RX ADMIN — PIPERACILLIN SODIUM AND TAZOBACTAM SODIUM 4.5 G: 4; .5 INJECTION, POWDER, FOR SOLUTION INTRAVENOUS at 01:07

## 2025-07-14 RX ADMIN — CLONAZEPAM 2 MG: 0.5 TABLET ORAL at 10:07

## 2025-07-14 RX ADMIN — LEVOTHYROXINE SODIUM 75 MCG: 75 TABLET ORAL at 05:07

## 2025-07-14 RX ADMIN — PIPERACILLIN SODIUM AND TAZOBACTAM SODIUM 4.5 G: 4; .5 INJECTION, POWDER, FOR SOLUTION INTRAVENOUS at 10:07

## 2025-07-14 RX ADMIN — DIAZEPAM 5 MG: 10 INJECTION, SOLUTION INTRAMUSCULAR; INTRAVENOUS at 05:07

## 2025-07-14 RX ADMIN — CLONAZEPAM 4 MG: 0.5 TABLET ORAL at 08:07

## 2025-07-14 RX ADMIN — VANCOMYCIN HYDROCHLORIDE 2000 MG: 500 INJECTION, POWDER, LYOPHILIZED, FOR SOLUTION INTRAVENOUS at 05:07

## 2025-07-14 RX ADMIN — ENOXAPARIN SODIUM 40 MG: 40 INJECTION SUBCUTANEOUS at 06:07

## 2025-07-14 RX ADMIN — ACETAMINOPHEN 650 MG: 325 TABLET ORAL at 03:07

## 2025-07-14 NOTE — ASSESSMENT & PLAN NOTE
VBG 7.323/39.8.   CTH negative for acute intracranial abnormalities.   UDS positive for benzo/THC.   Patient intubated in ED for airway protection. Concern for overdose 2/2 ingestion of prescribed Seroquel as empty prescription bottle was found at bedside per family. Poison control contacted. IV Magnesium administered in ED for Qtc prolongation. Admitted to MICU for HLOC.     Plan:   - Medical toxicology consulted, appreciate recs  - F/U UDS   - Psycyhiatry consult in the setting of suicidal attempt     - PEC--> CEC

## 2025-07-14 NOTE — HPI
Ravi Ibanez is a 44 year old white  man with hypertension, hyperlipidemia, hypothyroidism, schizoaffective disorder, former cigarette smoking, marijuana use, history of tonsillectomy on 6/1/2021. He lives in [place], [place]. His primary care physician is Dr. Dk Diaz.   He presented to [facility] Emergency [facility] on 7/9/2025 via emergency medical services for altered mental status and suspected suicide attempt. His father stated that he mentioned feeling depressed and suicidal several days ago. His father when to check on him after not hearing from him all day and found him obtunded next to an empty bottle of quetiapine. He was tachycardic with a [place] coma sacle of 4. EKG showed QTc prolongation. Urine was positive for benzodiazepines and cannabis. Labs showed hypokalemia. Head CT showed no acute intracranial pathology. Blood gas showed pH 7.32 and pCO2 38.8. lactate was 4.2 mmol/L. He was intubated for airway protection. Poison control was contacted and recommended supportive care and monitoring for anticholinergic poisoning. He was admitted to Critical Care Medicine.

## 2025-07-14 NOTE — ASSESSMENT & PLAN NOTE
Managed by psychiatry outpatient.    Home Regimen:  Clonazepam 1mg TID - klonopin restarted  Lamotrigine 200 QD - per mom this had been discontinued  Risperidone 3mg QD -- per mom this had been discontinued  Quetiapine 300mg BID - held due to recent overdose on this    Plan:  - Psychiatry consult   - Holding all psych meds in the setting of overdose   - Lamotrigine level <0.2   - PEC--> CEC

## 2025-07-14 NOTE — PROGRESS NOTES
[facility] Medicine  Progress Note    Patient Name: Ravi Ibanez  MRN: [id number]  Patient Class: IP- Inpatient   Admission Date: 7/9/2025  Length of Stay: 5 days  Attending Physician: Biju Velasco MD  Primary Care Provider: Dk Diaz MD        Subjective     Principal Problem:Overdose of antipsychotic        HPI:  Ravi Ibanez is a 44 year old white  man with hypertension, hyperlipidemia, hypothyroidism, schizoaffective disorder, former cigarette smoking, marijuana use, history of tonsillectomy on 6/1/2021. He lives in [place], [place]. His primary care physician is Dr. Dk Diaz.   He presented to [facility] Emergency Department on 7/9/2025 via emergency medical services for altered mental status and suspected suicide attempt. His father stated that he mentioned feeling depressed and suicidal several days ago. His father when to check on him after not hearing from him all day and found him obtunded next to an empty bottle of quetiapine. He was tachycardic with a Jessica coma sacle of 4. EKG showed QTc prolongation. Urine was positive for benzodiazepines and cannabis. Labs showed hypokalemia. Head CT showed no acute intracranial pathology. Blood gas showed pH 7.32 and pCO2 38.8. lactate was 4.2 mmol/L. He was intubated for airway protection. Poison control was contacted and recommended supportive care and monitoring for anticholinergic poisoning. He was admitted to Critical Care Medicine.    Overview/Hospital Course:  He was extubated on 7/11/2025. Urinalysis showed no evidence of urinary tract infection. Chest X-ray on 7/12/2025 showed bilateral perihilar and basilar opacity suspicious for infection. He developed fevers in the evening of 7/12/2025. He developed leukocytosis on 7/13/2025. He was started on piperacillin-tazobactam and vancomycin. Psychiatry was consulted. He was transferred to Hospital Medicine Team S on  7/14/2025. MRSA screen was negative.     Interval History: Stepped down from ICU. Awaiting Psychiatry consult. Treating pneumonia. Can stop vancomycin.     Review of Systems   Gastrointestinal:  Negative for nausea and vomiting.   Neurological:  Negative for seizures and syncope.     Objective:     Vital Signs (Most Recent):  Temp: 98.4 °F (36.9 °C) (07/14/25 0345)  Pulse: 104 (07/14/25 0659)  Resp: 20 (07/14/25 0345)  BP: (!) 145/97 (07/14/25 0345)  SpO2: (!) 90 % (07/14/25 0345) Vital Signs (24h Range):  Temp:  [98.1 °F (36.7 °C)-101.5 °F (38.6 °C)] 98.4 °F (36.9 °C)  Pulse:  [103-130] 104  Resp:  [20-44] 20  SpO2:  [89 %-100 %] 90 %  BP: (141-187)/() 145/97     Weight: 96 kg (211 lb 10.3 oz)  Body mass index is 29.52 kg/m².    Intake/Output Summary (Last 24 hours) at 7/14/2025 0755  Last data filed at 7/14/2025 0614  Gross per 24 hour   Intake 1035.15 ml   Output 3275 ml   Net -2239.85 ml         Physical Exam  Vitals and nursing note reviewed.   Constitutional:       General: He is not in acute distress.     Appearance: He is well-developed. He is not toxic-appearing or diaphoretic.      Interventions: He is not intubated.  Pulmonary:      Effort: Pulmonary effort is normal. No accessory muscle usage or respiratory distress. He is not intubated.   Neurological:      Mental Status: He is alert.      Cranial Nerves: No facial asymmetry.      Motor: No atrophy or seizure activity.   Psychiatric:         Behavior: Behavior is not agitated, aggressive, hyperactive or combative.               Significant Labs: All pertinent labs within the past 24 hours have been reviewed.    Significant Imaging: I have reviewed all pertinent imaging results/findings within the past 24 hours.      Assessment & Plan  Overdose of antipsychotic  Drug overdose, intentional   Acute metabolic encephalopathy   Overdosed on quetiapine. Required intubation and supportive care. Extubated. Effects of quetiapine resolved. Placed under PEC.  Psychiatry consulted.     Hypertension  Patient's blood pressure range in the last 24 hours was: BP  Min: 141/94  Max: 187/103.The patient's inpatient anti-hypertensive regimen is listed below:  Current Antihypertensives  metoprolol injection 5 mg, Every 5 min PRN, Intravenous  lisinopriL tablet 20 mg, Daily, Oral    Plan  - Continue home lisinopril. Monitor BP.  Hyperlipidemia  Holding home fenofibrate.    Hypothyroidism  Continue home levothyroxine.    Schizoaffective disorder  Psychiatry consulted. He is prescribed several medications including clonazepam, lamotrigine, risperidone, quetiapine, vortioxetine.     Acute metabolic encephalopathy      Drug overdose, intentional      Pneumonia of both lower lobes  Started on piperacillin-tazobactam and vancomycin. Stop vancomycin. Wean supplemental oxygen.   VTE Risk Mitigation (From admission, onward)           Ordered     enoxaparin injection 40 mg  Daily         07/09/25 2250     Place sequential compression device  Until discontinued         07/09/25 2250     IP VTE LOW RISK PATIENT  Once         07/09/25 2250                    Discharge Planning   EVERETTE: 7/15/2025     Code Status: Full Code   Medical Readiness for Discharge Date:   Discharge Plan A: Psychiatric hospital, Other (TBD)                              Biju Velasco MD  Department of Hospital Medicine   [facility] - Telemetry Stepdown

## 2025-07-14 NOTE — SUBJECTIVE & OBJECTIVE
Interval History: Stepped down from ICU. Awaiting Psychiatry consult. Treating pneumonia. Can stop vancomycin.     Review of Systems   Gastrointestinal:  Negative for nausea and vomiting.   Neurological:  Negative for seizures and syncope.     Objective:     Vital Signs (Most Recent):  Temp: 98.4 °F (36.9 °C) (07/14/25 0345)  Pulse: 104 (07/14/25 0659)  Resp: 20 (07/14/25 0345)  BP: (!) 145/97 (07/14/25 0345)  SpO2: (!) 90 % (07/14/25 0345) Vital Signs (24h Range):  Temp:  [98.1 °F (36.7 °C)-101.5 °F (38.6 °C)] 98.4 °F (36.9 °C)  Pulse:  [103-130] 104  Resp:  [20-44] 20  SpO2:  [89 %-100 %] 90 %  BP: (141-187)/() 145/97     Weight: 96 kg (211 lb 10.3 oz)  Body mass index is 29.52 kg/m².    Intake/Output Summary (Last 24 hours) at 7/14/2025 0755  Last data filed at 7/14/2025 0614  Gross per 24 hour   Intake 1035.15 ml   Output 3275 ml   Net -2239.85 ml         Physical Exam  Vitals and nursing note reviewed.   Constitutional:       General: He is not in acute distress.     Appearance: He is well-developed. He is not toxic-appearing or diaphoretic.      Interventions: He is not intubated.  Pulmonary:      Effort: Pulmonary effort is normal. No accessory muscle usage or respiratory distress. He is not intubated.   Neurological:      Mental Status: He is alert.      Cranial Nerves: No facial asymmetry.      Motor: No atrophy or seizure activity.   Psychiatric:         Behavior: Behavior is not agitated, aggressive, hyperactive or combative.               Significant Labs: All pertinent labs within the past 24 hours have been reviewed.    Significant Imaging: I have reviewed all pertinent imaging results/findings within the past 24 hours.

## 2025-07-14 NOTE — HOSPITAL COURSE
He was extubated on 7/11/2025. Urinalysis showed no evidence of urinary tract infection. Chest X-ray on 7/12/2025 showed bilateral perihilar and basilar opacity suspicious for infection. He developed fevers in the evening of 7/12/2025. He developed leukocytosis on 7/13/2025. He was started on piperacillin-tazobactam and vancomycin. Psychiatry was consulted. He was transferred to Hospital Medicine Team S on 7/14/2025. MRSA screen was negative. Overnight, he woke up, felt paranoid, thought he was at his house, and freaked out, running into another patient's room and requiring soft restraints. He regretted what he did afterward. He was medically ready for discharge to a psychiatric facility on 7/15/2025. He had 4 days of piperacillin-tazobactam, which was switched to amoxicillin-clavulanate. He was accepted to [facility].

## 2025-07-14 NOTE — PROGRESS NOTES
[facility] - Telemetry Stepdown  Critical Care Medicine  Progress Note    Patient Name: Ravi Ibanez  MRN: [id number]  Admission Date: 7/9/2025  Hospital Length of Stay: 5 days  Code Status: Full Code  Attending Provider: Biju Velasco MD  Primary Care Provider: Dk Diaz MD   Principal Problem: Overdose of antipsychotic    Subjective:     HPI:  Mr. Ravi Ibanez is a 45 y/o male with Schizoaffective disorder, hypothyroidism, hypertension and hyperlipidemia who [facility] via EMS with concerns for AMS and suspected suicide attempt. History obtained from chart review. Patient father stated that several days ago patient mentioned feeling depressed and suicidal. His father went to check on him after not hearing from him all day and found him obtunded next to an empty bottle of Seroquel. Unclear of how much time passed between suspected overdose and when he was found.   In the ED patient is obtunded and tachycardic with a GCS 4. EKG with Qtc prolongation. UA +Benzos and Cannabis, hypokalemic. Glucose, LFTs, TSH and B12 WNL. CT head without acute intracranial pathologies. 7.32/38.8 with a POC Lactate of 4.2. Patient is intubated in the ED for airway protection. Poison control was contacted given concerns for Seroquel ingestion, recommended supportive care and monitoring for anticholinergic poisoning. Toxicology consulted.     Critical Care consulted for management of respiratory failure in the setting of metabolic encephalopathy.     Hospital/ICU Course:  07/11/25: No SZ activity noted on EEG. Passes SAT/SBT --> extubated.  07/12/25: Fever late evening, work-up and abx initiated   07/13/25:continued fevers, mental status much improved. IVF, Klonipin restarted.    Interval Hx: continued fevers, restarted klonipin, IVF given    Review of Systems   Constitutional:  Positive for fever.   Respiratory:  Negative for chest tightness and shortness of breath.    Cardiovascular:  Negative for chest pain.    Gastrointestinal:  Negative for abdominal pain, nausea and vomiting.     Objective:     Vital Signs (Most Recent):  Temp: 98.6 °F (37 °C) (07/13/25 2306)  Pulse: 110 (07/13/25 2306)  Resp: 20 (07/13/25 2306)  BP: (!) 145/92 (07/13/25 2306)  SpO2: (!) 92 % (07/13/25 2306) Vital Signs (24h Range):  Temp:  [98.1 °F (36.7 °C)-101.5 °F (38.6 °C)] 98.6 °F (37 °C)  Pulse:  [103-131] 110  Resp:  [20-46] 20  SpO2:  [89 %-100 %] 92 %  BP: (141-187)/() 145/92   Weight: 96 kg (211 lb 10.3 oz)  Body mass index is 29.52 kg/m².      Intake/Output Summary (Last 24 hours) at 7/14/2025 0018  Last data filed at 7/13/2025 1800  Gross per 24 hour   Intake 1706.39 ml   Output 3475 ml   Net -1768.61 ml          Physical Exam  Vitals and nursing note reviewed.   Constitutional:       General: He is not in acute distress.     Appearance: He is not ill-appearing.   HENT:      Head: Normocephalic.   Eyes:      General: No scleral icterus.     Extraocular Movements:      Right eye: No nystagmus.      Left eye: No nystagmus.      Pupils: Pupils are equal, round, and reactive to light.      Comments: Sluggish pupils bilateral   Cardiovascular:      Rate and Rhythm: Regular rhythm. Tachycardia present.      Heart sounds: Normal heart sounds. No murmur heard.     No friction rub. No gallop.   Pulmonary:      Effort: No respiratory distress.      Breath sounds: No rhonchi or rales.      Comments: RA  Abdominal:      General: There is no distension.      Palpations: Abdomen is soft.      Tenderness: There is no guarding or rebound.   Musculoskeletal:      Right lower leg: No edema.      Left lower leg: No edema.   Skin:     General: Skin is warm.      Capillary Refill: Capillary refill takes less than 2 seconds.      Coloration: Skin is not jaundiced.      Findings: No bruising.   Neurological:      GCS: GCS eye subscore is 4. GCS verbal subscore is 5. GCS motor subscore is 6.      Coordination: Coordination abnormal.      Deep Tendon  Reflexes: Reflexes normal (improved).      Comments: Ocular clonus, hyperreflexia, chorea       Vents:  Vent Mode: A/C (07/11/25 1510)  Ventilator Initiated: Yes (07/10/25 0030)  Set Rate: 14 BPM (07/11/25 1510)  Vt Set: 450 mL (07/10/25 0704)  PEEP/CPAP: 5 cmH20 (07/11/25 1510)  Oxygen Concentration (%): 28 (07/12/25 0031)  Peak Airway Pressure: 14 cmH20 (07/11/25 1510)  Plateau Pressure: 0 cmH20 (07/11/25 1510)  Total Ve: 14.8 L/m (07/11/25 1510)  Negative Inspiratory Force (cm H2O): 0 (07/11/25 1510)  F/VT Ratio<105 (RSBI): (!) 47.41 (07/11/25 1510)  Lines/Drains/Airways       Drain  Duration                  Urethral Catheter 07/13/25 1103 <1 day              Peripheral Intravenous Line  Duration             Peripheral IV Single Lumen 07/09/25 2119 18 G Left Antecubital 4 days    Peripheral IV Single Lumen 07/09/25 2120 20 G Posterior;Right Hand 4 days    Peripheral IV Single Lumen 07/13/25 0814 20 G Anterior;Distal;Right Upper Arm <1 day                  Significant Labs:    CBC/Anemia Profile:  Recent Labs   Lab 07/12/25  0506 07/13/25  0515   WBC 7.91 14.60*   HGB 12.4* 9.5*   HCT 37.7* 28.8*    353   MCV 90 90   RDW 12.4 12.5        Chemistries:  Recent Labs   Lab 07/12/25  0506 07/12/25  0812 07/13/25  0515 07/13/25  0813 07/13/25  1611   *   < > 136 136 134*   K 4.5   < > 4.0 3.7 3.8      < > 102 104 102   CO2 21*   < > 19* 19* 21*   BUN 12   < > 15 15 14   CREATININE 1.0   < > 1.1 0.9 0.9   CALCIUM 8.9   < > 10.1 9.3 9.4   ALBUMIN 3.4*  --  3.9  --   --    PROT 6.7  --  8.6*  --   --    BILITOT 1.6*  --  1.5*  --   --    ALKPHOS 53  --  70  --   --    ALT 18  --  21  --   --    AST 23  --  30  --   --    MG 2.0  --  2.2  --   --    PHOS 2.8  --  2.0*  --   --     < > = values in this interval not displayed.       All pertinent labs within the past 24 hours have been reviewed.    Significant Imaging: I have reviewed all pertinent imaging results/findings within the past 24  hours.    ABG  Recent Labs   Lab 07/09/25  2356   PH 7.297*   PO2 52   PCO2 44.5   HCO3 21.7*   BE -5*     Assessment/Plan:     Neuro  Acute metabolic encephalopathy  VBG 7.323/39.8.   CTH negative for acute intracranial abnormalities.   UDS positive for benzo/THC.   Patient intubated in ED for airway protection. Concern for overdose 2/2 ingestion of prescribed Seroquel as empty prescription bottle was found at bedside per family. Poison control contacted. IV Magnesium administered in ED for Qtc prolongation. Admitted to MICU for HLOC.     Plan:   - Medical toxicology consulted, appreciate recs  - F/U UDS   - Psycyhiatry consult in the setting of suicidal attempt     - PEC--> CEC      Psychiatric  Drug overdose, intentional  PEC and Psych consult     Schizoaffective disorder  Managed by psychiatry outpatient.    Home Regimen:  Clonazepam 1mg TID - klonopin restarted  Lamotrigine 200 QD - per mom this had been discontinued  Risperidone 3mg QD -- per mom this had been discontinued  Quetiapine 300mg BID - held due to recent overdose on this    Plan:  - Psychiatry consult   - Holding all psych meds in the setting of overdose   - Lamotrigine level <0.2   - PEC--> CEC      Pulmonary  On mechanically assisted ventilation  Patient presented to [facility] ED following likely drug overdose with seroquel with GCS of 4, he was intubated in the ED for airway protection.     Supplemental oxygen was provided and noted-        Plan:  - Follow up infectious workup   - Titrate Fio2 as able for SpO2 >90%  - Nutrition consented for tube feed recs   - - SAT/SBT Daily: passed and extubated 07/11/25      Cardiac/Vascular  Hyperlipidemia  Home regimen: Fenofibrate 160mg     Plan:  - Resume when appropriate   - Follow up TAGs    Hypertension  Home regimen: Lisinopril 20mg     Plan:   - Resumed 7/13/25    Renal/  Lactic acid acidosis  resolved    Hypokalemia  Patient's most recent potassium results are listed below.   Recent Labs      07/12/25  0506 07/12/25  0812 07/12/25  1848   K 4.5 4.4 4.1       Plan:  - Replete potassium PRN per protocol      Endocrine  Hypothyroidism  Home regimen: Synthroid 75mg   TSH was 0.72    Plan:  - Continue home Levothyroxine     Other  * Overdose of antipsychotic  See above and appreciate Medical Tox consult    Fever  - -infection work up initiated. -- Follow Cx  -- Suspect Aspiration  -- Abx provided  -- Seems high for a central fever  -- Unclear if etoh or benzo withdrawal --> restarted home klonopin      Critical Care Time: 40 minutes  Critical secondary to Patient has a condition that poses threat to life and bodily function: Acute Myocardial Infarction and Psychiatric Illness with threat to self and others      Critical care was time spent personally by me on the following activities: development of treatment plan with patient or surrogate and bedside caregivers, discussions with consultants, evaluation of patient's response to treatment, examination of patient, ordering and performing treatments and interventions, ordering and review of laboratory studies, ordering and review of radiographic studies, pulse oximetry, re-evaluation of patient's condition. This critical care time did not overlap with that of any other provider or involve time for any procedures.     Joni Espino MD  Critical Care Medicine  [facility] - Telemetry Stepdown

## 2025-07-14 NOTE — ASSESSMENT & PLAN NOTE
Patient's blood pressure range in the last 24 hours was: BP  Min: 141/94  Max: 187/103.The patient's inpatient anti-hypertensive regimen is listed below:  Current Antihypertensives  metoprolol injection 5 mg, Every 5 min PRN, Intravenous  lisinopriL tablet 20 mg, Daily, Oral    Plan  - Continue home lisinopril. Monitor BP.

## 2025-07-14 NOTE — PLAN OF CARE
Problem: Infection  Goal: Absence of Infection Signs and Symptoms  Outcome: Progressing     Problem: Adult Inpatient Plan of Care  Goal: Plan of Care Review  Outcome: Progressing  Goal: Patient-Specific Goal (Individualized)  Outcome: Progressing  Goal: Absence of Hospital-Acquired Illness or Injury  Outcome: Progressing  Goal: Optimal Comfort and Wellbeing  Outcome: Progressing  Goal: Readiness for Transition of Care  Outcome: Progressing     Problem: Fall Injury Risk  Goal: Absence of Fall and Fall-Related Injury  Outcome: Progressing     Problem: Restraint, Nonviolent  Goal: Absence of Harm or Injury  Outcome: Progressing     Problem: Delirium  Goal: Optimal Coping  Outcome: Progressing  Goal: Improved Behavioral Control  Outcome: Progressing  Goal: Improved Attention and Thought Clarity  Outcome: Progressing  Goal: Improved Sleep  Outcome: Progressing     Problem: Skin Injury Risk Increased  Goal: Skin Health and Integrity  Outcome: Progressing

## 2025-07-14 NOTE — ASSESSMENT & PLAN NOTE
Psychiatry consulted. He is prescribed several medications including clonazepam, lamotrigine, risperidone, quetiapine, vortioxetine.

## 2025-07-14 NOTE — ASSESSMENT & PLAN NOTE
Patient presented to [facility] ED following likely drug overdose with seroquel with GCS of 4, he was intubated in the ED for airway protection.     Supplemental oxygen was provided and noted-        Plan:  - Follow up infectious workup   - Titrate Fio2 as able for SpO2 >90%  - Nutrition consented for tube feed recs   - - SAT/SBT Daily: passed and extubated 07/11/25

## 2025-07-14 NOTE — PLAN OF CARE
Problem: Infection  Goal: Absence of Infection Signs and Symptoms  Outcome: Progressing     Problem: Adult Inpatient Plan of Care  Goal: Plan of Care Review  Outcome: Progressing  Goal: Patient-Specific Goal (Individualized)  Outcome: Progressing  Goal: Absence of Hospital-Acquired Illness or Injury  Outcome: Progressing  Goal: Optimal Comfort and Wellbeing  Outcome: Progressing  Goal: Readiness for Transition of Care  Outcome: Progressing     Problem: Fall Injury Risk  Goal: Absence of Fall and Fall-Related Injury  Outcome: Progressing     Problem: Restraint, Nonviolent  Goal: Absence of Harm or Injury  Outcome: Progressing     Problem: Delirium  Goal: Optimal Coping  Outcome: Progressing  Goal: Improved Behavioral Control  Outcome: Progressing  Goal: Improved Attention and Thought Clarity  Outcome: Progressing  Goal: Improved Sleep  Outcome: Progressing     Problem: Skin Injury Risk Increased  Goal: Skin Health and Integrity  Outcome: Progressing     Problem: Pneumonia  Goal: Fluid Balance  Outcome: Progressing  Goal: Resolution of Infection Signs and Symptoms  Outcome: Progressing  Goal: Effective Oxygenation and Ventilation  Outcome: Progressing

## 2025-07-14 NOTE — RESIDENT HANDOFF
ICU Transfer of Care Note  Critical Care Medicine    Admit Date: 7/9/2025  LOS: 5    CC: Overdose of antipsychotic    Code Status: Full Code     Transfer to Hospital Medicine discussed with Hosp Team T at 1511    HPI and Hospital Course:     HPI:  Mr. Ravi Ibanez is a 43 y/o male with Schizoaffective disorder, hypothyroidism, hypertension and hyperlipidemia who presented to [facility] ED via EMS with concerns for AMS and suspected suicide attempt. History obtained from chart review. Patient father stated that several days ago patient mentioned feeling depressed and suicidal. His father went to check on him after not hearing from him all day and found him obtunded next to an empty bottle of Seroquel. Unclear of how much time passed between suspected overdose and when he was found.   In the ED patient is obtunded and tachycardic with a GCS 4. EKG with Qtc prolongation. UA +Benzos and Cannabis, hypokalemic. Glucose, LFTs, TSH and B12 WNL. CT head without acute intracranial pathologies. 7.32/38.8 with a POC Lactate of 4.2. Patient is intubated in the ED for airway protection. Poison control was contacted given concerns for Seroquel ingestion, recommended supportive care and monitoring for anticholinergic poisoning. Toxicology consulted.     Critical Care consulted for management of respiratory failure in the setting of metabolic encephalopathy.     Hospital/ICU Course:  07/11/25: No SZ activity noted on EEG. Passes SAT/SBT --> extubated.  07/12/25: Fever late evening, work-up and abx initiated   07/13/25:continued fevers, mental status much improved. IVF, Klonipin restarted.      To Follow Up:     Infection work up - fever, wbc, possible aspiration  HTN, fever --     Discharge Plan:     Psych placement once medically cleared.    Call with questions.

## 2025-07-14 NOTE — ASSESSMENT & PLAN NOTE
- -infection work up initiated. -- Follow Cx  -- Suspect Aspiration  -- Abx provided  -- Seems high for a central fever  -- Unclear if etoh or benzo withdrawal --> restarted home klonopin

## 2025-07-14 NOTE — ASSESSMENT & PLAN NOTE
Drug overdose, intentional   Acute metabolic encephalopathy   Overdosed on quetiapine. Required intubation and supportive care. Extubated. Effects of quetiapine resolved. Placed under PEC. Psychiatry consulted.

## 2025-07-14 NOTE — CONSULTS
"CONSULTATION LIAISON PSYCHIATRY INITIAL EVALUATION    Patient Name: Ravi Ibanez  MRN: [id number]  Patient Class: IP- Inpatient  Admission Date: 7/9/2025  Attending Physician: Biju Velasco MD      SUBJECTIVE:   Ravi Ibanez is a 44 y.o. male with past psychiatric history of schizoaffective disorder & past pertinent medical history of hypothyroidism, hypertension and hyperlipidemia presents to the ED/admitted to the hospital for concerns for AMS and suspected suicide attempt. Found next to empty bottle of seroquel.    Psychiatry consulted for "PEC'd for suiicide attempt"    Chart review:  5/29/25- PEC'd by psychologist for confusion and cognitive decline  Collateral contacted: Dr. Jatinder Rodrigues  Number used: [phone]  Relationship to patient: Outpatient Psychologist  Patient's recent behavior: Dr. Rodrigues says patient's recent behavior has been concerning due to cognitive decline, paranoia, latency of responses, trouble maintaining relationships, losing track of thoughts, and being sent home from work for not being able to complete tasks. This began about 1 month ago where he recommended the patient be evaluated for organic causes. Since the [facility] visit, Dr. Rodrigues reports the patient has remained declined and seems to possibly be getting even worse. His last visit with the patient was 2 days ago where he initiated the PEC.  Patient's baseline behavior: Dr. Rodrigues reports this patient has a baseline of paranoia but was improving on his medications. He was beginning to socialize more, he was more engaged with his family, and he was maintaining his steady job at [employer]. He said this has all changed within the last month and is continuing to decline.  Medications: Reports Seroquel 600 mg nightly and Risperdal 3 mg, believes the patient is not taking these medications at correct frequencies. Has recommended Vraylar to the patient before, but got very negative responses to changing to this " "medication.  Outpatient psychiatrist: Dr. Jatinder Rodrigues, last appointment 2 days ago (5/27/25)  Housing: Reports patient is currently living alone. Says patient's uncle was living with him until approximately 3 months ago. Claims patient's condition has declined ever since living alone.  Collateral concerns about patient: Concerned patient may not be taking his medications correctly. Says patient is very "disorganized" and believes he requires more medication supervision to ensure this isn't contributing to his current state. Also concerned that the dosing of the patient's medications is much higher than he is comfortable with, but says the patient is very reluctant and unwilling to lower these dosages.   Is collateral comfortable with patient being discharged: No, is adamant that patient should be admitted for inpatient treatment to ensure correct medication regimen and evaluate for causes of recent cognitive decline. Believes the patient's ability to care for himself has declined.    Home medications during this encounter:  Clonazepam 2 mg 3 x a day  Fenofibrate 160 mg  Lamictal 200 mg  Levothyroxine 75 mcg  Lisinopril oral  Metoprolol Succinate 50 mg  Risperdal 3 mg nightly  Seroquel 600 mg nightly  Trintellix 20 mg   Suspected OD on Rx seroquel 7/9- intubated; extubated 7/11      Upon initiation of interview, pt was sitting up in bed with sitter at bedside. Upon introducing myself as psychiatry he immediately shows me his IV locations and pulse ox monitor on his toe. During interview he is calm and cooperative. Oriented to month and year only. Unable to recall his location or why he is here. States "everything is a little foggy." During interview he is easily distracted and thought processes are circumstantial. He is unable to spell WORLD backwards ("LRW") or name the current president. While attempting to name the current president he then starts talking about his mood. Reports he feels like his mood has been " ""normal." Explains he is "always anxious and paranoid." Reports triggers are "not feeling adequate in life and in his friend group." Inquired about the Seroquel OD and he states "if I did do that it was just a spur of the moment cry for help." He goes on to state "I have been having a lot of ups and downs but have been dealing with the, I cope." When asked if he has ever had thoughts of wanting to end his life he states "I just deal with things." At this point during interview he then starts commenting on the hospital scrubs he is wearing and shows me his IV sites again. He is difficult to follow and requires frequent redirection. Concern pt does not recall OD or is minimizing the severity of his attempt.    Attempted to discuss his current medication regimen. He becomes fixated on finding his phone even though I tell him I have the list of the medications he is currently prescribed. He states "I've been taking everything like I should."    Denies currently feeling suicidal, concern pt is not telling the truth about OD attempt. Unable to recall events at this time. Denies AVH.       Psychiatric Review of Systems:  sleep: no  appetite: no  weight: no  energy/anergy: no  interest/pleasure/anhedonia: no  somatic symptoms: no  libido: no  anxiety/panic: no  guilty/hopelessness: no  concentration: no  S.I.B.s/risky behavior: no    Medical Review Of Systems:  Pertinent items listed in history above     Psychiatric History:  Previous Medication Trials: Yes -  Previous Psychiatric Hospitalizations: Yes -  Previous suicide attempts: No  Current/active homicidal ideation/plan/intent: No  History of threats/arrests associated with violent conduct - No  Access to firearms/lethal weapons - No  Family Psychiatric History: yes- mother bipolar disorder; alcoholism both sides of family; substance abuse mother's side of family  Outpatient Psychiatrist: Yes -   Outpatient Therapist: Yes - dr. Jatinder Rodrigues    Social History:  Marital " "Status: single  Children: 0   Employment Status: [employer] on and off   Education: high school diploma/GED  Special Ed: no  Housing Status: alone  History of Abuse: No    Substance Abuse History:  Recreational Drugs: marijuana  Use of Alcohol: occasional, social use  Rehab History: No  Tobacco Use: Yes - vapes      Legal History:  Past Charges/Incarcerations: No  Pending Charges: No    Psychosocial Factors:  Stressors: health.   Functioning Relationships: good support system      OBJECTIVE     Vital Signs:  Temp:  [98.1 °F (36.7 °C)-101.5 °F (38.6 °C)]   Pulse:  [103-130]   Resp:  [19-41]   BP: (141-187)/()   SpO2:  [89 %-95 %]     Mental Status Exam:  General Appearance: appears stated age, well-developed, well-nourished, adequately groomed, dressed in hospital garb  Behavior: cooperative, restless and fidgety  Involuntary Movements and Motor Activity: no abnormal involuntary movements noted; no tics, no tremors, no akathisia, no dystonia, no evidence of tardive dyskinesia; no psychomotor agitation or retardation  Gait and Station: unable to assess - patient lying down or seated  Speech and Language: intact; normal rate, rhythm, volume, tone, and pitch; conversational, spontaneous, and coherent; speaks and understands English proficiently and fluently; repeats words and phrases, no word finding difficulties are noted  Mood: "up and down"  Affect: constricted  Thought Process and Associations: circumstantial, scattered, +derailment  Perceptual Disturbances: denies hallucinations  Thought Content and Perceptions:: no suicidal or homicidal ideation, no auditory or visual hallucinations, no paranoid ideation, no ideas of reference, no evidence of delusions or psychosis  Sensorium and Orientation: oriented to person, year, and month only  Recent and Remote Memory: recent memory impaired  Attention and Concentration: impaired, unable to spell WORLD backwards  Fund of Knowledge: vocabulary appropriate, unable to " identify the   Insight: poor  Judgment: poor    CAM ICU positive? no      ASSESSMENT & RECOMMENDATIONS   H/o Schizoaffective disorder  R/o suicidal attempt   Intentional OD via seroquel      Scheduled Medication(s):  Agree with re-starting pt's home clonazepam:  d/c valium with re-initiation of clonazepam  Can restart pt's Trintellix 20 mg daily  Hold of on re-starting other psychiatric medications at this time     PRN Medication(s):  None warranted at this time    Delirium Behavior Management  PLEASE utilize PRN meds first for agitation. Minimize use of PHYSICAL restraints OR have periods of being out of physical restraints if possible.  Keep window shades open and room lit during day and room dim at night in order to promote normal sleep-wake cycles  Encourage family at bedside. Ace patient often to situation, location, date.  Continue to Limit or Discontinue use of Narcotics, Benzos and Anti-cholinergic medications as they may worsen delirium.  Continue medical workup for causative etiology of Delirium.     Risk Assessment / Legal Status  Continue PEC as patient is an imminent danger to self. Needs a 1:1 sitter at all times.    Follow-up:  Will follow-up while in house.    Disposition:   Seek involuntary inpatient psychiatric admission for stabilization of acute psychiatric illness once medically cleared. The patient &/or their family was informed of plan to transfer to an inpatient psychiatric unit once placement is found.     Please contact ON CALL psychiatry service (24/7) for any acute issues that may arise.    YADIRA Goff   Psychiatry  [facility]  7/14/2025 9:19 AM        --------------------------------------------------------------------------------------------------------------------------------------------------------------------------------------------------------------------------------------    CONTINUED OBJECTIVE clinical data &  findings reviewed and noted for above decision making    Current Medications:   Scheduled Meds:    clonazePAM  2 mg Oral Daily    clonazePAM  4 mg Oral QHS    diazePAM  5 mg Intravenous Q8H    enoxparin  40 mg Subcutaneous Daily    levothyroxine  75 mcg Oral Before breakfast    lisinopriL  20 mg Oral Daily    mupirocin   Nasal BID    piperacillin-tazobactam (Zosyn) IV (PEDS and ADULTS) (extended infusion is not appropriate)  4.5 g Intravenous Q8H     PRN Meds:   Current Facility-Administered Medications:     acetaminophen, 650 mg, Rectal, Q6H PRN    acetaminophen, 650 mg, Oral, Q6H PRN    bisacodyL, 5 mg, Oral, Daily PRN    magnesium oxide, 800 mg, Oral, PRN    magnesium oxide, 800 mg, Oral, PRN    metoprolol, 5 mg, Intravenous, Q5 Min PRN    ondansetron, 8 mg, Oral, Q8H PRN    potassium bicarbonate, 35 mEq, Oral, PRN    potassium bicarbonate, 50 mEq, Oral, PRN    potassium bicarbonate, 60 mEq, Oral, PRN    potassium, sodium phosphates, 2 packet, Oral, PRN    potassium, sodium phosphates, 2 packet, Oral, PRN    potassium, sodium phosphates, 2 packet, Oral, PRN    sodium chloride 0.9%, 10 mL, Intravenous, PRN    Allergies:   Review of patient's allergies indicates:  No Known Allergies    Vitals  Vitals:    07/14/25 0805   BP: (!) 142/94   Pulse: 103   Resp: 19   Temp: 99.8 °F (37.7 °C)       Labs/Imaging/Studies:  Recent Results (from the past 24 hours)   Basic metabolic panel    Collection Time: 07/13/25  4:11 PM   Result Value Ref Range    Sodium 134 (L) 136 - 145 mmol/L    Potassium 3.8 3.5 - 5.1 mmol/L    Chloride 102 95 - 110 mmol/L    CO2 21 (L) 23 - 29 mmol/L    Glucose 153 (H) 70 - 110 mg/dL    BUN 14 6 - 20 mg/dL    Creatinine 0.9 0.5 - 1.4 mg/dL    Calcium 9.4 8.7 - 10.5 mg/dL    Anion Gap 11 8 - 16 mmol/L    eGFR >60 >60 mL/min/1.73/m2     Imaging Results              X-Ray Chest 1 View (Final result)  Result time 07/10/25 00:39:13      Final result by Robson Gorman DO (07/10/25 00:39:13)                    Impression:      Endotracheal and nasogastric tubes as above.      Electronically signed by: Robson Gorman  Date:    07/10/2025  Time:    00:39               Narrative:    EXAMINATION:  XR CHEST 1 VIEW    CLINICAL HISTORY:  Other abnormalities of breathing    TECHNIQUE:  Single frontal view of the chest was performed.    COMPARISON:  10/23/2012.    FINDINGS:  There is an endotracheal tube with the tip approximately 6 cm proximal to the nehal.  There is a nasogastric tube with the tip in the stomach and the side port at the gastroesophageal junction.  The lungs are well expanded and clear. No focal opacities are seen. The pleural spaces are clear. The cardiac silhouette is unremarkable. The visualized osseous structures are unremarkable.                                       CT Head Without Contrast (Final result)  Result time 07/09/25 21:22:04      Final result by Ravi Aleman MD (07/09/25 21:22:04)                   Impression:      No acute intracranial abnormalities.    Partially visualized tube in the right nasopharynx.      Electronically signed by: Ravi Aleman MD  Date:    07/09/2025  Time:    21:22               Narrative:    EXAMINATION:  CT HEAD WITHOUT CONTRAST    CLINICAL HISTORY:  Mental status change, unknown cause;    TECHNIQUE:  Low dose axial images were obtained through the head.  Coronal and sagittal reformations were also performed. Contrast was not administered.    COMPARISON:  None.    FINDINGS:  The brain parenchyma appears normal for age with good corticomedullary differentiation.  There is no evidence of acute infarct, hemorrhage, or mass.  The ventricular system is normal in size.  No mass-effect or midline shift.  There are no abnormal extra-axial fluid collections.  Partial opacification posterior ethmoid air cells.  Remaining visualized paranasal sinuses and mastoid air cells are clear.  The calvarium appears intact. Partially visualized nasal pharyngeal tube in the right  nasopharynx.

## 2025-07-14 NOTE — PLAN OF CARE
07/14/25 1154   Post-Acute Status   Post-Acute Authorization Placement   Post-Acute Placement Status Pending medical clearance/testing   Discharge Delays None known at this time   Discharge Plan   Discharge Plan A Psychiatric hospital   Discharge Plan B Psychiatric hospital       Patient will be transferred to involuntary psychiatric hospital when medically ready per Psych recommendations.    Patient has declined to select a preferred provider and elects placement with the first accepting in network provider that is available to provide services as ordered by the physician       If an additional preferred facility not listed above is identified, additional referral to be sent. If above facilities unable to accept, will send additional referrals to in-network providers.     Discharge Plan A and Plan B have been determined by review of patient's clinical status, future medical and therapeutic needs, and coverage/benefits for post-acute care in coordination with multidisciplinary team members.      Donna Calles RN     [phone]

## 2025-07-14 NOTE — SUBJECTIVE & OBJECTIVE
Interval Hx: continued fevers, restarted klonipin, IVF given    Review of Systems   Constitutional:  Positive for fever.   Respiratory:  Negative for chest tightness and shortness of breath.    Cardiovascular:  Negative for chest pain.   Gastrointestinal:  Negative for abdominal pain, nausea and vomiting.     Objective:     Vital Signs (Most Recent):  Temp: 98.6 °F (37 °C) (07/13/25 2306)  Pulse: 110 (07/13/25 2306)  Resp: 20 (07/13/25 2306)  BP: (!) 145/92 (07/13/25 2306)  SpO2: (!) 92 % (07/13/25 2306) Vital Signs (24h Range):  Temp:  [98.1 °F (36.7 °C)-101.5 °F (38.6 °C)] 98.6 °F (37 °C)  Pulse:  [103-131] 110  Resp:  [20-46] 20  SpO2:  [89 %-100 %] 92 %  BP: (141-187)/() 145/92   Weight: 96 kg (211 lb 10.3 oz)  Body mass index is 29.52 kg/m².      Intake/Output Summary (Last 24 hours) at 7/14/2025 0018  Last data filed at 7/13/2025 1800  Gross per 24 hour   Intake 1706.39 ml   Output 3475 ml   Net -1768.61 ml          Physical Exam  Vitals and nursing note reviewed.   Constitutional:       General: He is not in acute distress.     Appearance: He is not ill-appearing.   HENT:      Head: Normocephalic.   Eyes:      General: No scleral icterus.     Extraocular Movements:      Right eye: No nystagmus.      Left eye: No nystagmus.      Pupils: Pupils are equal, round, and reactive to light.      Comments: Sluggish pupils bilateral   Cardiovascular:      Rate and Rhythm: Regular rhythm. Tachycardia present.      Heart sounds: Normal heart sounds. No murmur heard.     No friction rub. No gallop.   Pulmonary:      Effort: No respiratory distress.      Breath sounds: No rhonchi or rales.      Comments: RA  Abdominal:      General: There is no distension.      Palpations: Abdomen is soft.      Tenderness: There is no guarding or rebound.   Musculoskeletal:      Right lower leg: No edema.      Left lower leg: No edema.   Skin:     General: Skin is warm.      Capillary Refill: Capillary refill takes less than 2  seconds.      Coloration: Skin is not jaundiced.      Findings: No bruising.   Neurological:      GCS: GCS eye subscore is 4. GCS verbal subscore is 5. GCS motor subscore is 6.      Coordination: Coordination abnormal.      Deep Tendon Reflexes: Reflexes normal (improved).      Comments: Ocular clonus, hyperreflexia, chorea       Vents:  Vent Mode: A/C (07/11/25 1510)  Ventilator Initiated: Yes (07/10/25 0030)  Set Rate: 14 BPM (07/11/25 1510)  Vt Set: 450 mL (07/10/25 0704)  PEEP/CPAP: 5 cmH20 (07/11/25 1510)  Oxygen Concentration (%): 28 (07/12/25 0031)  Peak Airway Pressure: 14 cmH20 (07/11/25 1510)  Plateau Pressure: 0 cmH20 (07/11/25 1510)  Total Ve: 14.8 L/m (07/11/25 1510)  Negative Inspiratory Force (cm H2O): 0 (07/11/25 1510)  F/VT Ratio<105 (RSBI): (!) 47.41 (07/11/25 1510)  Lines/Drains/Airways       Drain  Duration                  Urethral Catheter 07/13/25 1103 <1 day              Peripheral Intravenous Line  Duration             Peripheral IV Single Lumen 07/09/25 2119 18 G Left Antecubital 4 days    Peripheral IV Single Lumen 07/09/25 2120 20 G Posterior;Right Hand 4 days    Peripheral IV Single Lumen 07/13/25 0814 20 G Anterior;Distal;Right Upper Arm <1 day                  Significant Labs:    CBC/Anemia Profile:  Recent Labs   Lab 07/12/25  0506 07/13/25  0515   WBC 7.91 14.60*   HGB 12.4* 9.5*   HCT 37.7* 28.8*    353   MCV 90 90   RDW 12.4 12.5        Chemistries:  Recent Labs   Lab 07/12/25  0506 07/12/25  0812 07/13/25  0515 07/13/25  0813 07/13/25  1611   *   < > 136 136 134*   K 4.5   < > 4.0 3.7 3.8      < > 102 104 102   CO2 21*   < > 19* 19* 21*   BUN 12   < > 15 15 14   CREATININE 1.0   < > 1.1 0.9 0.9   CALCIUM 8.9   < > 10.1 9.3 9.4   ALBUMIN 3.4*  --  3.9  --   --    PROT 6.7  --  8.6*  --   --    BILITOT 1.6*  --  1.5*  --   --    ALKPHOS 53  --  70  --   --    ALT 18  --  21  --   --    AST 23  --  30  --   --    MG 2.0  --  2.2  --   --    PHOS 2.8  --  2.0*  --    --     < > = values in this interval not displayed.       All pertinent labs within the past 24 hours have been reviewed.    Significant Imaging: I have reviewed all pertinent imaging results/findings within the past 24 hours.

## 2025-07-14 NOTE — PROGRESS NOTES
VANCOMYCIN DOSING BY PHARMACY DISCONTINUATION NOTE    Ravi Ibanez is a 44 y.o. male who had been consulted for vancomycin dosing.    The pharmacy consult for vancomycin dosing has been discontinued.     Vancomycin Dosing by Pharmacy Consult will sign-off. Please reconsult if necessary. Thank you for allowing us to participate in this patient's care.     Gloria Rodriguez, PharmD  [phone]

## 2025-07-15 VITALS
OXYGEN SATURATION: 94 % | HEIGHT: 71 IN | BODY MASS INDEX: 29.63 KG/M2 | DIASTOLIC BLOOD PRESSURE: 97 MMHG | RESPIRATION RATE: 18 BRPM | WEIGHT: 211.63 LBS | HEART RATE: 104 BPM | TEMPERATURE: 99 F | SYSTOLIC BLOOD PRESSURE: 143 MMHG

## 2025-07-15 PROBLEM — R41.82 AMS (ALTERED MENTAL STATUS): Status: RESOLVED | Noted: 2025-07-14 | Resolved: 2025-07-15

## 2025-07-15 PROBLEM — G93.41 ACUTE METABOLIC ENCEPHALOPATHY: Status: RESOLVED | Noted: 2025-07-09 | Resolved: 2025-07-15

## 2025-07-15 LAB
ABSOLUTE EOSINOPHIL (OHS): 0.16 K/UL
ABSOLUTE MONOCYTE (OHS): 1.05 K/UL (ref 0.3–1)
ABSOLUTE NEUTROPHIL COUNT (OHS): 7.13 K/UL (ref 1.8–7.7)
ALBUMIN SERPL BCP-MCNC: 3.1 G/DL (ref 3.5–5.2)
ALP SERPL-CCNC: 191 UNIT/L (ref 40–150)
ALT SERPL W/O P-5'-P-CCNC: 131 UNIT/L (ref 10–44)
ANION GAP (OHS): 11 MMOL/L (ref 8–16)
AST SERPL-CCNC: 114 UNIT/L (ref 11–45)
BACTERIA SPT CULT: NORMAL
BASOPHILS # BLD AUTO: 0.05 K/UL
BASOPHILS NFR BLD AUTO: 0.5 %
BILIRUB SERPL-MCNC: 0.8 MG/DL (ref 0.1–1)
BUN SERPL-MCNC: 16 MG/DL (ref 6–20)
CALCIUM SERPL-MCNC: 9.5 MG/DL (ref 8.7–10.5)
CHLORIDE SERPL-SCNC: 105 MMOL/L (ref 95–110)
CO2 SERPL-SCNC: 22 MMOL/L (ref 23–29)
CREAT SERPL-MCNC: 0.9 MG/DL (ref 0.5–1.4)
DRUGS UR SCN NOM: NORMAL
ERYTHROCYTE [DISTWIDTH] IN BLOOD BY AUTOMATED COUNT: 12.9 % (ref 11.5–14.5)
GFR SERPLBLD CREATININE-BSD FMLA CKD-EPI: >60 ML/MIN/1.73/M2
GLUCOSE SERPL-MCNC: 114 MG/DL (ref 70–110)
GRAM STN SPEC: NORMAL
HCT VFR BLD AUTO: 40.6 % (ref 40–54)
HGB BLD-MCNC: 13.8 GM/DL (ref 14–18)
IMM GRANULOCYTES # BLD AUTO: 0.38 K/UL (ref 0–0.04)
IMM GRANULOCYTES NFR BLD AUTO: 3.8 % (ref 0–0.5)
LYMPHOCYTES # BLD AUTO: 1.26 K/UL (ref 1–4.8)
MAGNESIUM SERPL-MCNC: 2.3 MG/DL (ref 1.6–2.6)
MCH RBC QN AUTO: 29.6 PG (ref 27–31)
MCHC RBC AUTO-ENTMCNC: 34 G/DL (ref 32–36)
MCV RBC AUTO: 87 FL (ref 82–98)
NUCLEATED RBC (/100WBC) (OHS): 0 /100 WBC
OHS QRS DURATION: 84 MS
OHS QRS DURATION: 92 MS
OHS QTC CALCULATION: 430 MS
OHS QTC CALCULATION: 453 MS
PHOSPHATE SERPL-MCNC: 2.2 MG/DL (ref 2.7–4.5)
PLATELET # BLD AUTO: 362 K/UL (ref 150–450)
PMV BLD AUTO: 9.5 FL (ref 9.2–12.9)
POTASSIUM SERPL-SCNC: 3.7 MMOL/L (ref 3.5–5.1)
PROT SERPL-MCNC: 7.3 GM/DL (ref 6–8.4)
RBC # BLD AUTO: 4.67 M/UL (ref 4.6–6.2)
RELATIVE EOSINOPHIL (OHS): 1.6 %
RELATIVE LYMPHOCYTE (OHS): 12.6 % (ref 18–48)
RELATIVE MONOCYTE (OHS): 10.5 % (ref 4–15)
RELATIVE NEUTROPHIL (OHS): 71 % (ref 38–73)
SODIUM SERPL-SCNC: 138 MMOL/L (ref 136–145)
WBC # BLD AUTO: 10.03 K/UL (ref 3.9–12.7)

## 2025-07-15 PROCEDURE — 82040 ASSAY OF SERUM ALBUMIN: CPT | Mod: HCNC

## 2025-07-15 PROCEDURE — 83735 ASSAY OF MAGNESIUM: CPT | Mod: HCNC

## 2025-07-15 PROCEDURE — 93005 ELECTROCARDIOGRAM TRACING: CPT | Mod: HCNC

## 2025-07-15 PROCEDURE — 36415 COLL VENOUS BLD VENIPUNCTURE: CPT | Mod: HCNC

## 2025-07-15 PROCEDURE — 93010 ELECTROCARDIOGRAM REPORT: CPT | Mod: HCNC,,, | Performed by: INTERNAL MEDICINE

## 2025-07-15 PROCEDURE — 25000003 PHARM REV CODE 250: Mod: HCNC | Performed by: HOSPITALIST

## 2025-07-15 PROCEDURE — 85025 COMPLETE CBC W/AUTO DIFF WBC: CPT | Mod: HCNC

## 2025-07-15 PROCEDURE — 99232 SBSQ HOSP IP/OBS MODERATE 35: CPT | Mod: HCNC,,,

## 2025-07-15 PROCEDURE — 84100 ASSAY OF PHOSPHORUS: CPT | Mod: HCNC

## 2025-07-15 PROCEDURE — 25000003 PHARM REV CODE 250: Mod: HCNC | Performed by: INTERNAL MEDICINE

## 2025-07-15 PROCEDURE — 94761 N-INVAS EAR/PLS OXIMETRY MLT: CPT | Mod: HCNC

## 2025-07-15 PROCEDURE — 63600175 PHARM REV CODE 636 W HCPCS: Mod: HCNC | Performed by: INTERNAL MEDICINE

## 2025-07-15 PROCEDURE — 51702 INSERT TEMP BLADDER CATH: CPT | Mod: HCNC

## 2025-07-15 PROCEDURE — 63600175 PHARM REV CODE 636 W HCPCS: Mod: HCNC | Performed by: HOSPITALIST

## 2025-07-15 RX ORDER — LORAZEPAM 2 MG/ML
2 INJECTION INTRAMUSCULAR ONCE AS NEEDED
Status: DISCONTINUED | OUTPATIENT
Start: 2025-07-15 | End: 2025-07-15

## 2025-07-15 RX ORDER — AMOXICILLIN AND CLAVULANATE POTASSIUM 875; 125 MG/1; MG/1
1 TABLET, FILM COATED ORAL EVERY 12 HOURS
Status: DISCONTINUED | OUTPATIENT
Start: 2025-07-15 | End: 2025-07-15 | Stop reason: HOSPADM

## 2025-07-15 RX ORDER — RISPERIDONE 1 MG/1
1 TABLET ORAL ONCE
Status: COMPLETED | OUTPATIENT
Start: 2025-07-15 | End: 2025-07-15

## 2025-07-15 RX ORDER — HALOPERIDOL LACTATE 5 MG/ML
5 INJECTION, SOLUTION INTRAMUSCULAR EVERY 6 HOURS PRN
Status: DISCONTINUED | OUTPATIENT
Start: 2025-07-15 | End: 2025-07-15

## 2025-07-15 RX ORDER — TALC
6 POWDER (GRAM) TOPICAL NIGHTLY PRN
Status: DISCONTINUED | OUTPATIENT
Start: 2025-07-15 | End: 2025-07-15 | Stop reason: HOSPADM

## 2025-07-15 RX ORDER — LORAZEPAM 2 MG/ML
2 INJECTION INTRAMUSCULAR ONCE AS NEEDED
Status: DISCONTINUED | OUTPATIENT
Start: 2025-07-15 | End: 2025-07-15 | Stop reason: HOSPADM

## 2025-07-15 RX ORDER — HALOPERIDOL LACTATE 5 MG/ML
5 INJECTION, SOLUTION INTRAMUSCULAR EVERY 6 HOURS PRN
Status: DISCONTINUED | OUTPATIENT
Start: 2025-07-15 | End: 2025-07-15 | Stop reason: HOSPADM

## 2025-07-15 RX ADMIN — AMOXICILLIN AND CLAVULANATE POTASSIUM 1 TABLET: 875; 125 TABLET, FILM COATED ORAL at 09:07

## 2025-07-15 RX ADMIN — HALOPERIDOL LACTATE 5 MG: 5 INJECTION, SOLUTION INTRAMUSCULAR at 01:07

## 2025-07-15 RX ADMIN — RISPERIDONE 1 MG: 1 TABLET, FILM COATED ORAL at 11:07

## 2025-07-15 RX ADMIN — CLONAZEPAM 2 MG: 0.5 TABLET ORAL at 09:07

## 2025-07-15 RX ADMIN — MUPIROCIN: 20 OINTMENT TOPICAL at 09:07

## 2025-07-15 RX ADMIN — PIPERACILLIN SODIUM AND TAZOBACTAM SODIUM 4.5 G: 4; .5 INJECTION, POWDER, LYOPHILIZED, FOR SOLUTION INTRAVENOUS at 03:07

## 2025-07-15 RX ADMIN — LEVOTHYROXINE SODIUM 75 MCG: 75 TABLET ORAL at 06:07

## 2025-07-15 NOTE — SUBJECTIVE & OBJECTIVE
Interval History: Can go to inpatient psych today. Medically ready. Displaying behavior that requires further psychiatric management.     Review of Systems   Gastrointestinal:  Negative for nausea and vomiting.   Neurological:  Negative for seizures and syncope.     Objective:     Vital Signs (Most Recent):  Temp: 99.1 °F (37.3 °C) (07/15/25 0726)  Pulse: 100 (07/15/25 0807)  Resp: 18 (07/15/25 0726)  BP: (!) 144/94 (07/15/25 0726)  SpO2: (!) 91 % (07/15/25 0807) Vital Signs (24h Range):  Temp:  [98.6 °F (37 °C)-99.8 °F (37.7 °C)] 99.1 °F (37.3 °C)  Pulse:  [] 100  Resp:  [18-60] 18  SpO2:  [91 %-94 %] 91 %  BP: (127-163)/(81-96) 144/94     Weight: 96 kg (211 lb 10.3 oz)  Body mass index is 29.52 kg/m².    Intake/Output Summary (Last 24 hours) at 7/15/2025 0902  Last data filed at 7/14/2025 1839  Gross per 24 hour   Intake 980 ml   Output 2300 ml   Net -1320 ml         Physical Exam  Vitals and nursing note reviewed.   Constitutional:       General: He is not in acute distress.     Appearance: He is well-developed. He is not toxic-appearing or diaphoretic.      Interventions: He is not intubated.     Comments: restraints   Pulmonary:      Effort: Pulmonary effort is normal. No accessory muscle usage or respiratory distress. He is not intubated.   Skin:     General: Skin is warm and dry.      Coloration: Skin is not jaundiced or pale.   Neurological:      Mental Status: He is alert.      Cranial Nerves: No facial asymmetry.      Motor: No atrophy or seizure activity.               Significant Labs: All pertinent labs within the past 24 hours have been reviewed.    Significant Imaging: I have reviewed all pertinent imaging results/findings within the past 24 hours.

## 2025-07-15 NOTE — ASSESSMENT & PLAN NOTE
Patient's blood pressure range in the last 24 hours was: BP  Min: 127/81  Max: 163/85.The patient's inpatient anti-hypertensive regimen is listed below:  Current Antihypertensives  metoprolol injection 5 mg, Every 5 min PRN, Intravenous  lisinopriL tablet 20 mg, Daily, Oral    Plan  - Continue home lisinopril. Monitor BP.

## 2025-07-15 NOTE — PROGRESS NOTES
"CONSULTATION LIAISON PSYCHIATRY PROGRESS NOTE    Patient Name: Ravi Ibanez  MRN: [id number]  Patient Class: IP- Inpatient  Admission Date: 7/9/2025  Attending Physician: Biju Velasco MD      SUBJECTIVE:   Ravi Ibanez is a 44 y.o. male with past psychiatric history of schizoaffective disorder & past pertinent medical history of hypothyroidism, hypertension and hyperlipidemia presents to the ED/admitted to the hospital for concerns for AMS and suspected suicide attempt. Found next to empty bottle of seroquel.       Psychiatry consulted for "PEC'd for suiicide attempt"    Interval history: per RN    Patient had a bizarre night, he got out of the bed and grabbed the white board off the wall and chased staff up the pelayo into another patient's room. Security had to carry him back to his room and the doctor order 4 point violent restraints and haldol. Patient stated "I take full responsibility for all of my actions and I apologize".     Pt rec'd 5mg haldol IM at 0111    Upon psychiatric interview today, pt lying in bed in 4 point restraints with sitter at bedside. Pt remains in restraints 2/2 incident earlier this morning. Explained to him he is not allowed to leave his room and the restraints are there for his safety. States he understands and if they are removed he will stay in his room. He explains his paranoia came back this AM and that is why he ran out. He states "I felt like someone was trying to get me. I woke up and thought I was at my house but then realized I wasn't and I freaked out." Denies currently feeling paranoid but "not sure if it will come back."     He remains bizarre during interview telling me he is sweating and doesn't have on underwear instead of answering questions asked. Easily distracted, circumstantial,  +derailment.  He is able to state he is in the hospital but still is unsure why he is here. Does not recall the seroquel OD. When asked the current year he states "I need to find " "my 2001 grand fabián." When told I was asking for the current year we are in he laughs and states "24 or 25." States correctly the month is July.    Denies SI/HI/AVH    Will restart home antipsychotics pending EKG results.     OBJECTIVE    Vital Signs:  Temp:  [98.6 °F (37 °C)-99.8 °F (37.7 °C)]   Pulse:  []   Resp:  [18-60]   BP: (127-163)/(81-96)   SpO2:  [91 %-94 %]     Mental Status Exam:  General Appearance: appears stated age, well-developed, well-nourished, adequately groomed, dressed in hospital garb  Behavior: cooperative, restless and fidgety  Involuntary Movements and Motor Activity: no abnormal involuntary movements noted; no tics, no tremors, no akathisia, no dystonia, no evidence of tardive dyskinesia; no psychomotor agitation or retardation  Gait and Station: unable to assess - patient lying down or seated  Speech and Language: intact; normal rate, rhythm, volume, tone, and pitch; conversational, spontaneous, and coherent; speaks and understands English proficiently and fluently; repeats words and phrases, no word finding difficulties are noted  Mood: "fine"  Affect: constricted  Thought Process and Associations: circumstantial, scattered, +derailment  Perceptual Disturbances: denies hallucinations  Thought Content and Perceptions:: no suicidal or homicidal ideation, no auditory or visual hallucinations, no paranoid ideation, no ideas of reference, no evidence of delusions or psychosis  Sensorium and Orientation: oriented to person, year, and month only  Recent and Remote Memory: recent memory impaired  Attention and Concentration: impaired, unable to spell WORLD backwards  Fund of Knowledge: vocabulary appropriate, unable to identify the   Insight: poor  Judgment: poor    CAM ICU positive? no      ASSESSMENT & RECOMMENDATIONS   H/o Schizoaffective disorder  R/o suicidal attempt   Intentional OD via seroquel      Scheduled Medication(s):  Restart home Risperdal at " 1mg PO BID  Monitor Qtc: 453 on 7/15 (down from 535 on 7/10)  Continue clonazepam 2mg PO QAM, 4mg PO QHS for now. Consider weaning off if delirium does not improve     PRN Medication(s):  Can use haldol 5mg PO/IM Q6H PRN any non redirectable agitation     Delirium Behavior Management  PLEASE utilize PRN meds first for agitation. Minimize use of PHYSICAL restraints OR have periods of being out of physical restraints if possible.  Keep window shades open and room lit during day and room dim at night in order to promote normal sleep-wake cycles  Encourage family at bedside. [place] patient often to situation, location, date.  Continue to Limit or Discontinue use of Narcotics, Benzos and Anti-cholinergic medications as they may worsen delirium.  Continue medical workup for causative etiology of Delirium.     Risk Assessment / Legal Status  Continue PEC as patient is an imminent danger to self. Needs a 1:1 sitter at all times.    Follow-up:  Will follow-up while in house.    Disposition:   Seek involuntary inpatient psychiatric admission for stabilization of acute psychiatric illness once medically cleared. The patient &/or their family was informed of plan to transfer to an inpatient psychiatric unit once placement is found.     Please contact ON CALL psychiatry service (24/7) for any acute issues that may arise.    YADIRA Goff   Psychiatry  [facility]  7/15/2025 10:50 AM        --------------------------------------------------------------------------------------------------------------------------------------------------------------------------------------------------------------------------------------    CONTINUED OBJECTIVE clinical data & findings reviewed and noted for above decision making    Current Medications:   Scheduled Meds:    amoxicillin-clavulanate 875-125mg  1 tablet Oral Q12H    clonazePAM  2 mg Oral Daily    clonazePAM  4 mg Oral QHS    enoxparin  40 mg Subcutaneous  Daily    levothyroxine  75 mcg Oral Before breakfast    lisinopriL  20 mg Oral Daily    mupirocin   Nasal BID     PRN Meds:   Current Facility-Administered Medications:     acetaminophen, 650 mg, Rectal, Q6H PRN    acetaminophen, 650 mg, Oral, Q6H PRN    bisacodyL, 5 mg, Oral, Daily PRN    haloperidol lactate, 5 mg, Intramuscular, Q6H PRN    lorazepam, 2 mg, Intramuscular, Once PRN    metoprolol, 5 mg, Intravenous, Q5 Min PRN    ondansetron, 8 mg, Oral, Q8H PRN    sodium chloride 0.9%, 10 mL, Intravenous, PRN    Allergies:   Review of patient's allergies indicates:  No Known Allergies    Vitals  Vitals:    07/15/25 0807   BP:    Pulse: 100   Resp:    Temp:        Labs/Imaging/Studies:  Recent Results (from the past 24 hours)   EKG 12-lead    Collection Time: 07/15/25  1:44 AM   Result Value Ref Range    QRS Duration 84 ms    [facility] QTC Calculation 430 ms   Comprehensive metabolic panel    Collection Time: 07/15/25  5:20 AM   Result Value Ref Range    Sodium 138 136 - 145 mmol/L    Potassium 3.7 3.5 - 5.1 mmol/L    Chloride 105 95 - 110 mmol/L    CO2 22 (L) 23 - 29 mmol/L    Glucose 114 (H) 70 - 110 mg/dL    BUN 16 6 - 20 mg/dL    Creatinine 0.9 0.5 - 1.4 mg/dL    Calcium 9.5 8.7 - 10.5 mg/dL    Protein Total 7.3 6.0 - 8.4 gm/dL    Albumin 3.1 (L) 3.5 - 5.2 g/dL    Bilirubin Total 0.8 0.1 - 1.0 mg/dL     (H) 40 - 150 unit/L     (H) 11 - 45 unit/L     (H) 10 - 44 unit/L    Anion Gap 11 8 - 16 mmol/L    eGFR >60 >60 mL/min/1.73/m2   Magnesium    Collection Time: 07/15/25  5:20 AM   Result Value Ref Range    Magnesium  2.3 1.6 - 2.6 mg/dL   Phosphorus    Collection Time: 07/15/25  5:20 AM   Result Value Ref Range    Phosphorus Level 2.2 (L) 2.7 - 4.5 mg/dL   CBC with Differential    Collection Time: 07/15/25  5:20 AM   Result Value Ref Range    WBC 10.03 3.90 - 12.70 K/uL    RBC 4.67 4.60 - 6.20 M/uL    HGB 13.8 (L) 14.0 - 18.0 gm/dL    HCT 40.6 40.0 - 54.0 %    MCV 87 82 - 98 fL    MCH 29.6 27.0 -  31.0 pg    MCHC 34.0 32.0 - 36.0 g/dL    RDW 12.9 11.5 - 14.5 %    Platelet Count 362 150 - 450 K/uL    MPV 9.5 9.2 - 12.9 fL    Nucleated RBC 0 <=0 /100 WBC    Neut % 71.0 38 - 73 %    Lymph % 12.6 (L) 18 - 48 %    Mono % 10.5 4 - 15 %    Eos % 1.6 <=8 %    Basophil % 0.5 <=1.9 %    Imm Grans % 3.8 (H) 0.0 - 0.5 %    Neut # 7.13 1.8 - 7.7 K/uL    Lymph # 1.26 1 - 4.8 K/uL    Mono # 1.05 (H) 0.3 - 1 K/uL    Eos # 0.16 <=0.5 K/uL    Baso # 0.05 <=0.2 K/uL    Imm Grans # 0.38 (H) 0.00 - 0.04 K/uL   EKG 12-lead    Collection Time: 07/15/25  9:54 AM   Result Value Ref Range    QRS Duration 92 ms    [facility] QTC Calculation 453 ms     Imaging Results              X-Ray Chest 1 View (Final result)  Result time 07/10/25 00:39:13      Final result by Robson Gorman DO (07/10/25 00:39:13)                   Impression:      Endotracheal and nasogastric tubes as above.      Electronically signed by: Robson Gorman  Date:    07/10/2025  Time:    00:39               Narrative:    EXAMINATION:  XR CHEST 1 VIEW    CLINICAL HISTORY:  Other abnormalities of breathing    TECHNIQUE:  Single frontal view of the chest was performed.    COMPARISON:  10/23/2012.    FINDINGS:  There is an endotracheal tube with the tip approximately 6 cm proximal to the nehal.  There is a nasogastric tube with the tip in the stomach and the side port at the gastroesophageal junction.  The lungs are well expanded and clear. No focal opacities are seen. The pleural spaces are clear. The cardiac silhouette is unremarkable. The visualized osseous structures are unremarkable.                                       CT Head Without Contrast (Final result)  Result time 07/09/25 21:22:04      Final result by Ravi Aleman MD (07/09/25 21:22:04)                   Impression:      No acute intracranial abnormalities.    Partially visualized tube in the right nasopharynx.      Electronically signed by: Ravi Aleman MD  Date:    07/09/2025  Time:    21:22                Narrative:    EXAMINATION:  CT HEAD WITHOUT CONTRAST    CLINICAL HISTORY:  Mental status change, unknown cause;    TECHNIQUE:  Low dose axial images were obtained through the head.  Coronal and sagittal reformations were also performed. Contrast was not administered.    COMPARISON:  None.    FINDINGS:  The brain parenchyma appears normal for age with good corticomedullary differentiation.  There is no evidence of acute infarct, hemorrhage, or mass.  The ventricular system is normal in size.  No mass-effect or midline shift.  There are no abnormal extra-axial fluid collections.  Partial opacification posterior ethmoid air cells.  Remaining visualized paranasal sinuses and mastoid air cells are clear.  The calvarium appears intact. Partially visualized nasal pharyngeal tube in the right nasopharynx.

## 2025-07-15 NOTE — NURSING
Patient is a 71y old  Male who presents with a chief complaint of VIKAS, rhabdomyolysis (28 Jul 2023 08:54)      INTERVAL HPI/OVERNIGHT EVENTS:  not in acute distress and quadriplegic and base line TBI     MEDICATIONS  (STANDING):  atorvastatin 80 milliGRAM(s) Oral at bedtime  divalproex  milliGRAM(s) Oral three times a day  enoxaparin Injectable 60 milliGRAM(s) SubCutaneous every 12 hours  lidocaine   4% Patch 1 Patch Transdermal every 24 hours  metoprolol tartrate 25 milliGRAM(s) Oral two times a day  oxyCODONE  ER Tablet 10 milliGRAM(s) Oral every 12 hours  piperacillin/tazobactam IVPB.. 3.375 Gram(s) IV Intermittent every 8 hours  QUEtiapine 100 milliGRAM(s) Oral at bedtime  sodium chloride 0.9%. 1000 milliLiter(s) (75 mL/Hr) IV Continuous <Continuous>  traZODone 50 milliGRAM(s) Oral at bedtime    MEDICATIONS  (PRN):  acetaminophen     Tablet .. 650 milliGRAM(s) Oral every 6 hours PRN Mild Pain (1 - 3), Moderate Pain (4 - 6)  melatonin 3 milliGRAM(s) Oral at bedtime PRN Insomnia  oxyCODONE    IR 5 milliGRAM(s) Oral every 4 hours PRN Moderate Pain (4 - 6)  oxyCODONE    IR 10 milliGRAM(s) Oral every 4 hours PRN Severe Pain (7 - 10)      Allergies    No Known Allergies    Intolerances        REVIEW OF SYSTEMSdifficult with his base line and h/o TBI  Vital Signs Last 24 Hrs  T(C): 37 (28 Jul 2023 10:23), Max: 37.7 (27 Jul 2023 16:29)  T(F): 98.6 (28 Jul 2023 10:23), Max: 99.9 (27 Jul 2023 16:29)  HR: 139 (28 Jul 2023 10:23) (66 - 139)  BP: 133/76 (28 Jul 2023 10:23) (104/67 - 133/76)  BP(mean): --  RR: 20 (28 Jul 2023 10:23) (16 - 20)  SpO2: 96% (28 Jul 2023 10:23) (96% - 99%)    Parameters below as of 28 Jul 2023 10:23  Patient On (Oxygen Delivery Method): nasal cannula  O2 Flow (L/min): 2      PHYSICAL EXAM:  GENERAL: NAD, well-groomed, well-developed  HEAD:  Atraumatic, Normocephalic  EYES: EOMI, PERRLA, conjunctiva and sclera clear  ENMT: No tonsillar erythema, exudates, or enlargement; Moist mucous membranes, Good dentition, No lesions  NECK: Supple, No JVD, Normal thyroid  NERVOUS SYSTEM:  Alert quadriplegic h/O TBI    CHEST/LUNG: Clear to percussion bilaterally; No rales, rhonchi, wheezing, or rubs  HEART: Regular rate and rhythm; No murmurs, rubs, or gallops  ABDOMEN: Soft, Nontender, Nondistended; Bowel sounds present  EXTREMITIES: contracted lower ext LYMPH: No lymphadenopathy noted  SKIN: No rashes or lesions    LABS:                        11.8   18.61 )-----------( 233      ( 27 Jul 2023 06:10 )             36.2     07-27    134<L>  |  95<L>  |  31<H>  ----------------------------<  163<H>  4.4   |  32<H>  |  1.38<H>    Ca    8.2<L>      27 Jul 2023 13:42    TPro  6.0  /  Alb  1.7<L>  /  TBili  0.4  /  DBili  x   /  AST  74<H>  /  ALT  39  /  AlkPhos  59  07-27      Urinalysis Basic - ( 27 Jul 2023 13:42 )    Color: x / Appearance: x / SG: x / pH: x  Gluc: 163 mg/dL / Ketone: x  / Bili: x / Urobili: x   Blood: x / Protein: x / Nitrite: x   Leuk Esterase: x / RBC: x / WBC x   Sq Epi: x / Non Sq Epi: x / Bacteria: x      CAPILLARY BLOOD GLUCOSE          RADIOLOGY & ADDITIONAL TESTS:    Imaging Personally Reviewed:  [ X] YES  [ ] NO    Consultant(s) Notes Reviewed:  [ X] YES  [ ] NO    Care Discussed with Consultants/Other Providers [X ] YES  [ ] NO Report was called to [facility] to LISA Saleh. Questions were asked and answers were given. Transport: Ruben came picked up the pt via stretcher off the unit with security and sitter. All personal belongings were sent with the pt and family is aware.

## 2025-07-15 NOTE — NURSING
"Patient had a bizarre night, he got out of the bed and grabbed the white board off the wall and chased staff up the pelayo into another patient's room. Security had to carry him back to his room and the doctor order 4 point violent restraints and haldol. Patient stated "I take full responsibility for all of my actions and I apologize".  "

## 2025-07-15 NOTE — ASSESSMENT & PLAN NOTE
Started on piperacillin-tazobactam and vancomycin. Stopped vancomycin. Weaned off supplemental oxygen. Change antibiotic to amoxicillin-clavulanate, to end today.

## 2025-07-15 NOTE — DISCHARGE SUMMARY
[facility] Medicine  Discharge Summary      Patient Name: Ravi Ibanez  MRN: [id number]  OMA: [id number]  Patient Class: IP- Inpatient  Admission Date: 7/9/2025  Hospital Length of Stay: 6 days  Discharge Date and Time: 7/15/2025  3:38 PM  Attending Physician: Biju Velasco MD   Discharging Provider: Biju Velasco MD  Primary Care Provider: Dk Diaz MD  [facility] Medicine Team: [facility] MED S Biju Velasco MD  Primary Care Team: [facility]    HPI:   Ravi Ibanez is a 44 year old white  man with hypertension, hyperlipidemia, hypothyroidism, schizoaffective disorder, former cigarette smoking, marijuana use, history of tonsillectomy on 6/1/2021. He lives in [place], [place]. His primary care physician is Dr. Dk Diaz.   He presented to [facility] on 7/9/2025 via emergency medical services for altered mental status and suspected suicide attempt. His father stated that he mentioned feeling depressed and suicidal several days ago. His father when to check on him after not hearing from him all day and found him obtunded next to an empty bottle of quetiapine. He was tachycardic with a [place] coma sacle of 4. EKG showed QTc prolongation. Urine was positive for benzodiazepines and cannabis. Labs showed hypokalemia. Head CT showed no acute intracranial pathology. Blood gas showed pH 7.32 and pCO2 38.8. lactate was 4.2 mmol/L. He was intubated for airway protection. Poison control was contacted and recommended supportive care and monitoring for anticholinergic poisoning. He was admitted to Critical Care Medicine.    Hospital Course:   He was extubated on 7/11/2025. Urinalysis showed no evidence of urinary tract infection. Chest X-ray on 7/12/2025 showed bilateral perihilar and basilar opacity suspicious for infection. He developed fevers in the evening of 7/12/2025. He developed leukocytosis on 7/13/2025. He  was started on piperacillin-tazobactam and vancomycin. Psychiatry was consulted. He was transferred to Hospital Medicine Team S on 7/14/2025. MRSA screen was negative. Overnight, he woke up, felt paranoid, thought he was at his house, and freaked out, running into another patient's room and requiring soft restraints. He regretted what he did afterward. He was medically ready for discharge to a psychiatric facility on 7/15/2025. He had 4 days of piperacillin-tazobactam, which was switched to amoxicillin-clavulanate. He was accepted to [facility].      Goals of Care Treatment Preferences:  Code Status: Full Code         Consults:   Consults (From admission, onward)          Status Ordering Provider     Inpatient consult to Psychiatry  Once        Provider:  (Not yet assigned)    Completed FREDA WALLACE     Inpatient consult to Registered Dietitian/Nutritionist  Once        Provider:  (Not yet assigned)    Completed ROBBY LANG     Inpatient Consult to Medical Toxicology  Once        Provider:  (Not yet assigned)    Completed BESSIE GAITAN     Inpatient consult to Critical Care Medicine  Once        Provider:  (Not yet assigned)    Completed PARIS SAMANIEGO            Final Active Diagnoses:    Diagnosis Date Noted POA    PRINCIPAL PROBLEM:  Overdose of antipsychotic [T43.501A] 07/10/2025 Yes    Pneumonia of both lower lobes [J18.9] 07/12/2025 No    Drug overdose, intentional [T50.902A] 07/10/2025 Yes    Hypertension [I10] 04/30/2025 Yes     Chronic    Hypothyroidism [E03.9] 04/30/2025 Yes     Chronic    Schizoaffective disorder [F25.9] 04/30/2025 Yes     Chronic    Hyperlipidemia [E78.5] 04/30/2025 Yes     Chronic      Problems Resolved During this Admission:    Diagnosis Date Noted Date Resolved POA    AMS (altered mental status) [R41.82] 07/14/2025 07/15/2025 Unknown    Hypothermia [T68.XXXA] 07/10/2025 07/12/2025 Yes    Acute metabolic encephalopathy [G93.41] 07/09/2025 07/15/2025 Yes     Hypokalemia [E87.6] 07/09/2025 07/14/2025 Yes    On mechanically assisted ventilation [Z99.11] 07/09/2025 07/14/2025 Not Applicable    Lactic acid acidosis [E87.20] 07/09/2025 07/14/2025 Yes       Discharged Condition: good    Disposition: Psychiatric Hospital    Follow Up: [facility]    Patient Instructions:   No discharge procedures on file.    Significant Diagnostic Studies:   CT Head Without Contrast 7/9/25: FINDINGS:   The brain parenchyma appears normal for age with good corticomedullary differentiation.  There is no evidence of acute infarct, hemorrhage, or mass.  The ventricular system is normal in size.  No mass-effect or midline shift.  There are no abnormal extra-axial fluid collections.  Partial opacification posterior ethmoid air cells.  Remaining visualized paranasal sinuses and mastoid air cells are clear.  The calvarium appears intact. Partially visualized nasal pharyngeal tube in the right nasopharynx.   Impression: No acute intracranial abnormalities.   Partially visualized tube in the right nasopharynx.   X-Ray Chest 1 View 7/10/25: FINDINGS:   There is an endotracheal tube with the tip approximately 6 cm proximal to the nehal.  There is a nasogastric tube with the tip in the stomach and the side port at the gastroesophageal junction.  The lungs are well expanded and clear. No focal opacities are seen. The pleural spaces are clear. The cardiac silhouette is unremarkable. The visualized osseous structures are unremarkable.   X-Ray Chest AP Portable 7/12/25: FINDINGS:   The left lung is clear.  There is bilateral perihilar and basilar opacity suspicious for infection.  There is no pneumothorax or pleural fluid.  The cardiac silhouette is not enlarged.  The osseous structures are unremarkable.     Medications:  Reconciled Home Medications:      Medication List        ASK your doctor about these medications      * clonazePAM 2 MG Tab  Commonly known as: KlonoPIN  Take 4 mg by mouth every evening. 1  TABLET BY MOUTH IN THE MORNING AND 2 TABLETS AT BEDTIME     * clonazePAM 2 MG Tab  Commonly known as: KlonoPIN  Take 2 mg by mouth once daily. 1 TABLET BY MOUTH IN THE MORNING AND 2 TABLETS AT BEDTIME     fenofibrate 160 MG Tab  Take 160 mg by mouth once daily.     LaMICtal 200 mg tablet  Generic drug: lamoTRIgine  Take 200 mg by mouth once daily.     levothyroxine 75 MCG tablet  Commonly known as: SYNTHROID  Take 75 mcg by mouth before breakfast.     LISINOPRIL ORAL  Take 20 mg by mouth Daily.     RisperDAL 3 mg Tab  Generic drug: risperiDONE  Take 3 mg by mouth nightly.     SeroqueL 300 mg Tab  Generic drug: QUEtiapine  Take 600 mg by mouth nightly.     TRINTELLIX 20 mg Tab  Generic drug: vortioxetine           * This list has 2 medication(s) that are the same as other medications prescribed for you. Read the directions carefully, and ask your doctor or other care provider to review them with you.                  Indwelling Lines/Drains at time of discharge: none        Time spent on the discharge of patient: 35 minutes         Biju Velasco MD  Department of Hospital Medicine  [facility] - Telemetry Stepdown

## 2025-07-15 NOTE — PLAN OF CARE
Transfer initiated with [facility].      Donna Calles RN     [phone]

## 2025-07-15 NOTE — SUBJECTIVE & OBJECTIVE
Interval History: Can go to inpatient psych today. Medically ready.    Review of Systems   Gastrointestinal:  Negative for nausea and vomiting.   Neurological:  Negative for seizures and syncope.     Objective:     Vital Signs (Most Recent):  Temp: 99.1 °F (37.3 °C) (07/15/25 0726)  Pulse: 100 (07/15/25 0807)  Resp: 18 (07/15/25 0726)  BP: (!) 144/94 (07/15/25 0726)  SpO2: (!) 91 % (07/15/25 0807) Vital Signs (24h Range):  Temp:  [98.6 °F (37 °C)-99.8 °F (37.7 °C)] 99.1 °F (37.3 °C)  Pulse:  [] 100  Resp:  [18-60] 18  SpO2:  [91 %-94 %] 91 %  BP: (127-163)/(81-96) 144/94     Weight: 96 kg (211 lb 10.3 oz)  Body mass index is 29.52 kg/m².    Intake/Output Summary (Last 24 hours) at 7/15/2025 0842  Last data filed at 7/14/2025 1839  Gross per 24 hour   Intake 980 ml   Output 2300 ml   Net -1320 ml         Physical Exam  Vitals and nursing note reviewed.   Constitutional:       General: He is not in acute distress.     Appearance: He is well-developed. He is not toxic-appearing or diaphoretic.      Interventions: He is not intubated.  Pulmonary:      Effort: Pulmonary effort is normal. No accessory muscle usage or respiratory distress. He is not intubated.   Neurological:      Mental Status: He is alert.      Cranial Nerves: No facial asymmetry.      Motor: No atrophy or seizure activity.   Psychiatric:         Behavior: Behavior is not agitated, aggressive, hyperactive or combative.               Significant Labs: All pertinent labs within the past 24 hours have been reviewed.    Significant Imaging: I have reviewed all pertinent imaging results/findings within the past 24 hours.

## 2025-07-15 NOTE — PROGRESS NOTES
[facility]  Progress Note    Patient Name: Ravi Ibanez  MRN: [id number]  Patient Class: IP- Inpatient   Admission Date: 7/9/2025  Length of Stay: 6 days  Attending Physician: Biju Velasco MD  Primary Care Provider: Dk Diaz MD        Subjective     Principal Problem:Overdose of antipsychotic        HPI:  Ravi Ibanez is a 44 year old white  man with hypertension, hyperlipidemia, hypothyroidism, schizoaffective disorder, former cigarette smoking, marijuana use, history of tonsillectomy on 6/1/2021. He lives in [place], [place]. His primary care physician is Dr. Dk Diaz.   He presented to [facility] on 7/9/2025 via emergency medical services for altered mental status and suspected suicide attempt. His father stated that he mentioned feeling depressed and suicidal several days ago. His father when to check on him after not hearing from him all day and found him obtunded next to an empty bottle of quetiapine. He was tachycardic with a Jessica coma sacle of 4. EKG showed QTc prolongation. Urine was positive for benzodiazepines and cannabis. Labs showed hypokalemia. Head CT showed no acute intracranial pathology. Blood gas showed pH 7.32 and pCO2 38.8. lactate was 4.2 mmol/L. He was intubated for airway protection. Poison control was contacted and recommended supportive care and monitoring for anticholinergic poisoning. He was admitted to Critical Care Medicine.    Overview/Hospital Course:  He was extubated on 7/11/2025. Urinalysis showed no evidence of urinary tract infection. Chest X-ray on 7/12/2025 showed bilateral perihilar and basilar opacity suspicious for infection. He developed fevers in the evening of 7/12/2025. He developed leukocytosis on 7/13/2025. He was started on piperacillin-tazobactam and vancomycin. Psychiatry was consulted. He was transferred to Hospital Medicine Team S on  7/14/2025. MRSA screen was negative. He was medically ready for discharge to a psychiatric facility on 7/15/2025. He had 4 days of piperacillin-tazobactam, which was switched to amoxicillin-clavulanate.     Interval History: Can go to inpatient psych today. Medically ready. Displaying behavior that requires further psychiatric management.     Review of Systems   Gastrointestinal:  Negative for nausea and vomiting.   Neurological:  Negative for seizures and syncope.     Objective:     Vital Signs (Most Recent):  Temp: 99.1 °F (37.3 °C) (07/15/25 0726)  Pulse: 100 (07/15/25 0807)  Resp: 18 (07/15/25 0726)  BP: (!) 144/94 (07/15/25 0726)  SpO2: (!) 91 % (07/15/25 0807) Vital Signs (24h Range):  Temp:  [98.6 °F (37 °C)-99.8 °F (37.7 °C)] 99.1 °F (37.3 °C)  Pulse:  [] 100  Resp:  [18-60] 18  SpO2:  [91 %-94 %] 91 %  BP: (127-163)/(81-96) 144/94     Weight: 96 kg (211 lb 10.3 oz)  Body mass index is 29.52 kg/m².    Intake/Output Summary (Last 24 hours) at 7/15/2025 0902  Last data filed at 7/14/2025 1839  Gross per 24 hour   Intake 980 ml   Output 2300 ml   Net -1320 ml         Physical Exam  Vitals and nursing note reviewed.   Constitutional:       General: He is not in acute distress.     Appearance: He is well-developed. He is not toxic-appearing or diaphoretic.      Interventions: He is not intubated.     Comments: restraints   Pulmonary:      Effort: Pulmonary effort is normal. No accessory muscle usage or respiratory distress. He is not intubated.   Skin:     General: Skin is warm and dry.      Coloration: Skin is not jaundiced or pale.   Neurological:      Mental Status: He is alert.      Cranial Nerves: No facial asymmetry.      Motor: No atrophy or seizure activity.               Significant Labs: All pertinent labs within the past 24 hours have been reviewed.    Significant Imaging: I have reviewed all pertinent imaging results/findings within the past 24 hours.      Assessment & Plan  Overdose of  antipsychotic  Drug overdose, intentional   Acute metabolic encephalopathy   Overdosed on quetiapine. Required intubation and supportive care. Extubated. Effects of quetiapine resolved. Placed under PEC. Psychiatry following, recommend inpatient psychiatric treatment. Required restraints for behavior overnight. Now calm. Will remove restraints. Needs further psychiatric management. Medically ready for transfer to inpatient psychiatric facility.    Hypertension  Patient's blood pressure range in the last 24 hours was: BP  Min: 127/81  Max: 163/85.The patient's inpatient anti-hypertensive regimen is listed below:  Current Antihypertensives  metoprolol injection 5 mg, Every 5 min PRN, Intravenous  lisinopriL tablet 20 mg, Daily, Oral    Plan  - Continue home lisinopril. Monitor BP.  Hyperlipidemia  Holding home fenofibrate.    Hypothyroidism  Continue home levothyroxine.    Schizoaffective disorder  Psychiatry consulted. He is prescribed several medications including clonazepam, lamotrigine, risperidone, quetiapine, vortioxetine.     Drug overdose, intentional      Pneumonia of both lower lobes  Started on piperacillin-tazobactam and vancomycin. Stopped vancomycin. Weaned off supplemental oxygen. Change antibiotic to amoxicillin-clavulanate, to end today.    VTE Risk Mitigation (From admission, onward)           Ordered     enoxaparin injection 40 mg  Daily         07/09/25 2250     Place sequential compression device  Until discontinued         07/09/25 2250     IP VTE LOW RISK PATIENT  Once         07/09/25 2250                    Discharge Planning   EVERETTE: 7/17/2025     Code Status: Full Code   Medical Readiness for Discharge Date:   Discharge Plan A: Psychiatric hospital   Discharge Delays: None known at this time                    Biju Velasco MD  Department of Hospital Medicine   [facility] - Telemetry Stepdown

## 2025-07-15 NOTE — AI DETERIORATION ALERT
"RAPID RESPONSE NURSE AI ALERT       AI alert received.    Chart Reviewed: 07/15/2025, 3:30 AM    MRN: [id number]  Bed: 8084/8084 A    Dx: Overdose of antipsychotic    Ravi Ibanez has a past medical history of Hyperlipidemia, Hypertension, Hypothyroidism, and Schizoaffective disorder.    Last VS: BP (!) 162/94 (BP Location: Left arm, Patient Position: Lying)   Pulse 110   Temp 99.8 °F (37.7 °C) (Oral)   Resp (!) 60   Ht 5' 11" (1.803 m)   Wt 96 kg (211 lb 10.3 oz)   SpO2 (!) 91%   BMI 29.52 kg/m²     24H Vital Sign Range:  Temp:  [98.4 °F (36.9 °C)-99.8 °F (37.7 °C)]   Pulse:  []   Resp:  [18-60]   BP: (127-163)/(81-97)   SpO2:  [90 %-94 %]     Level of Consciousness (AVPU): alert    Recent Labs     07/12/25  0506 07/13/25  0515   WBC 7.91 14.60*   HGB 12.4* 9.5*   HCT 37.7* 28.8*    353       Recent Labs     07/12/25  0506 07/12/25  0812 07/13/25  0515 07/13/25  0813 07/13/25  1611   *   < > 136 136 134*   K 4.5   < > 4.0 3.7 3.8      < > 102 104 102   CO2 21*   < > 19* 19* 21*   BUN 12   < > 15 15 14   CREATININE 1.0   < > 1.1 0.9 0.9      < > 147* 105 153*   PHOS 2.8  --  2.0*  --   --    MG 2.0  --  2.2  --   --     < > = values in this interval not displayed.          OXYGEN:  Flow (L/min) (Oxygen Therapy): 3  Oxygen Concentration (%): 28       MEWS score: 4    Bedside nurse, Lance contacted for AI alert reports inaccurate RR validated by PCT safety sitting on pt. Reports she is at bedside and current RR is 19.Will update VS in flowsheets. No additional concerns verbalized at this time. Instructed to call [phone] for further concerns or assistance.    Red Westbrook RN       "

## 2025-07-15 NOTE — AI DETERIORATION ALERT
"RAPID RESPONSE NURSE AI ALERT       AI alert received.    Chart Reviewed: 07/14/2025, 7:21 PM    MRN: [id number]  Bed: 8084/8084 A    Dx: Overdose of antipsychotic    Ravi Ibanez has a past medical history of Hyperlipidemia, Hypertension, Hypothyroidism, and Schizoaffective disorder.    Last VS: BP (!) 153/96   Pulse 99   Temp 98.7 °F (37.1 °C) (Oral)   Resp 20   Ht 5' 11" (1.803 m)   Wt 96 kg (211 lb 10.3 oz)   SpO2 (!) 94%   BMI 29.52 kg/m²     24H Vital Sign Range:  Temp:  [98.4 °F (36.9 °C)-99.8 °F (37.7 °C)]   Pulse:  []   Resp:  [18-23]   BP: (141-156)/(92-97)   SpO2:  [89 %-94 %]     Level of Consciousness (AVPU): alert    Recent Labs     07/12/25  0506 07/13/25  0515   WBC 7.91 14.60*   HGB 12.4* 9.5*   HCT 37.7* 28.8*    353       Recent Labs     07/12/25  0506 07/12/25  0812 07/13/25  0515 07/13/25  0813 07/13/25  1611   *   < > 136 136 134*   K 4.5   < > 4.0 3.7 3.8      < > 102 104 102   CO2 21*   < > 19* 19* 21*   BUN 12   < > 15 15 14   CREATININE 1.0   < > 1.1 0.9 0.9      < > 147* 105 153*   PHOS 2.8  --  2.0*  --   --    MG 2.0  --  2.2  --   --     < > = values in this interval not displayed.      OXYGEN:  Flow (L/min) (Oxygen Therapy): 3  Oxygen Concentration (%): 28       MEWS score: 1    Bedside nurse, Lance contacted for  Alert reports Erroneous respiratory rate charted. Pt VSS and he is resting comfortably. No additional concerns verbalized at this time. Instructed to call [phone] for further concerns or assistance.    Lindsey Pinto RN        "

## 2025-07-15 NOTE — ASSESSMENT & PLAN NOTE
Drug overdose, intentional   Acute metabolic encephalopathy   Overdosed on quetiapine. Required intubation and supportive care. Extubated. Effects of quetiapine resolved. Placed under PEC. Psychiatry following, recommend inpatient psychiatric treatment. Required restraints for behavior overnight. Now calm. Will remove restraints. Needs further psychiatric management. Medically ready for transfer to inpatient psychiatric facility.

## 2025-07-16 PROBLEM — R79.89 ELEVATED LFTS: Status: ACTIVE | Noted: 2025-07-16

## 2025-07-16 PROBLEM — T50.901A OVERDOSE: Status: ACTIVE | Noted: 2025-07-10

## 2025-07-16 PROBLEM — Z13.9 ENCOUNTER FOR MEDICAL SCREENING EXAMINATION: Status: ACTIVE | Noted: 2025-07-16

## 2025-07-16 NOTE — PLAN OF CARE
[facility] - Telemetry Stepdown  Discharge Final Note    Primary Care Provider: Dk Diaz MD    Expected Discharge Date: 7/15/2025    Patient discharged 7/15/2025 and transferred to [facility] via ambulance.         Final Discharge Note (most recent)       Final Note - 07/16/25 0842          Final Note    Assessment Type Final Discharge Note     Anticipated Discharge Disposition Psychiatric Hospital        Post-Acute Status    Post-Acute Authorization Placement     Post-Acute Placement Status Set-up Complete/Auth obtained     Discharge Delays None known at this time                     Important Message from Medicare             Donna Calles RN     [phone]

## 2025-07-18 LAB
BACTERIA BLD CULT: NORMAL

## 2025-07-30 ENCOUNTER — OFFICE VISIT (OUTPATIENT)
Dept: URGENT CARE | Facility: CLINIC | Age: 45
End: 2025-07-30
Payer: MEDICARE

## 2025-07-30 VITALS
HEIGHT: 71 IN | BODY MASS INDEX: 29.54 KG/M2 | HEART RATE: 78 BPM | SYSTOLIC BLOOD PRESSURE: 124 MMHG | OXYGEN SATURATION: 98 % | DIASTOLIC BLOOD PRESSURE: 70 MMHG | RESPIRATION RATE: 16 BRPM | TEMPERATURE: 98 F | WEIGHT: 211 LBS

## 2025-07-30 DIAGNOSIS — J06.9 UPPER RESPIRATORY TRACT INFECTION, UNSPECIFIED TYPE: Primary | ICD-10-CM

## 2025-07-30 DIAGNOSIS — Z20.822 COUGH WITH EXPOSURE TO COVID-19 VIRUS: ICD-10-CM

## 2025-07-30 DIAGNOSIS — R05.8 COUGH WITH EXPOSURE TO COVID-19 VIRUS: ICD-10-CM

## 2025-07-30 LAB
CTP QC/QA: YES
SARS-COV+SARS-COV-2 AG RESP QL IA.RAPID: NEGATIVE

## 2025-07-30 PROCEDURE — 99204 OFFICE O/P NEW MOD 45 MIN: CPT | Mod: S$GLB,,,

## 2025-07-30 PROCEDURE — 87811 SARS-COV-2 COVID19 W/OPTIC: CPT | Mod: QW,S$GLB,,

## 2025-07-30 PROCEDURE — 71046 X-RAY EXAM CHEST 2 VIEWS: CPT | Mod: S$GLB,,, | Performed by: RADIOLOGY

## 2025-07-30 RX ORDER — PROMETHAZINE HYDROCHLORIDE AND DEXTROMETHORPHAN HYDROBROMIDE 6.25; 15 MG/5ML; MG/5ML
5 SYRUP ORAL EVERY 6 HOURS PRN
Qty: 60 ML | Refills: 0 | Status: SHIPPED | OUTPATIENT
Start: 2025-07-30 | End: 2025-08-09

## 2025-07-30 RX ORDER — BENZONATATE 200 MG/1
200 CAPSULE ORAL 3 TIMES DAILY PRN
Qty: 42 CAPSULE | Refills: 0 | Status: SHIPPED | OUTPATIENT
Start: 2025-07-30 | End: 2025-08-13

## 2025-07-30 NOTE — PROGRESS NOTES
"Subjective:      Patient ID: Ravi Ibanez is a 44 y.o. male.    Vitals:  height is 5' 11" (1.803 m) and weight is 95.7 kg (211 lb). His oral temperature is 98 °F (36.7 °C). His blood pressure is 124/70 and his pulse is 78. His respiration is 16 and oxygen saturation is 98%.     Chief Complaint: Chronic cough/ recently inpatient     Pt presents states he was recently hospitalized for pneumonia , per pt treated in house with ABX,  them he was discharged and sent to [facility] for tx, states he continued to cough and not get any better, still has productive cough. Finished course of Augmentin, states he has an appt with pulmonary tomorrow and wants a repeat CXR. Denies fevers, chest pain, shortness of breath.    Cough  This is a chronic problem. The current episode started 1 to 4 weeks ago. The problem has been gradually worsening. The problem occurs constantly. The cough is Productive of sputum. Associated symptoms include a sore throat. Pertinent negatives include no chest pain or fever.       Constitution: Negative for fever.   HENT:  Positive for sore throat. Negative for sinus pain and sinus pressure.    Cardiovascular:  Negative for chest pain and sob on exertion.   Respiratory:  Positive for cough.    Gastrointestinal:  Negative for nausea, vomiting and diarrhea.      Objective:     Physical Exam   Constitutional: He is oriented to person, place, and time.   HENT:   Ears:   Right Ear: Tympanic membrane normal.   Left Ear: Tympanic membrane normal.   Mouth/Throat: Mucous membranes are moist. Oropharynx is clear.   Neck: No neck rigidity present.   Cardiovascular: Normal rate, regular rhythm and normal heart sounds.   Pulmonary/Chest: Effort normal and breath sounds normal. No stridor. No respiratory distress. He has no wheezes. He has no rhonchi. He has no rales. He exhibits no tenderness.   Musculoskeletal: Normal range of motion.         General: Normal range of motion.      Cervical back: " He exhibits no tenderness.   Lymphadenopathy:     He has no cervical adenopathy.   Neurological: He is oriented to person, place, and time.   Skin: Skin is warm and dry.   Psychiatric: His behavior is normal. Mood, judgment and thought content normal.   Nursing note and vitals reviewed.    Results for orders placed or performed in visit on 07/30/25   SARS Coronavirus 2 Antigen, POCT Manual Read    Collection Time: 07/30/25  6:02 PM   Result Value Ref Range    SARS Coronavirus 2 Antigen Negative Negative, Presumptive Negative     Acceptable Yes      XR CHEST PA AND LATERAL  Result Date: 7/30/2025  EXAMINATION: XR CHEST PA AND LATERAL CLINICAL HISTORY: Other specified cough TECHNIQUE: PA and lateral views of the chest were performed. COMPARISON: None FINDINGS: The lungs are clear, with normal appearance of pulmonary vasculature and no pleural effusion or pneumothorax. The cardiac silhouette is normal in size. The hilar and mediastinal contours are unremarkable. Bones are intact.     No acute abnormality. Electronically signed by: Darrell Schulz Date:    07/30/2025 Time:    18:07    Assessment:     1. Upper respiratory tract infection, unspecified type    2. Cough with exposure to COVID-19 virus        Plan:     Tylenol or Motrin for pain or fever greater than 100.4 F.  Increase humidity  Saltwater gargles  Hot tea with honey  Take medicine as prescribed    Return to clinic for no improvement in symptoms.  Report to the emergency room for worsening of symptoms.    Upper respiratory tract infection, unspecified type    Cough with exposure to COVID-19 virus  -     SARS Coronavirus 2 Antigen, POCT Manual Read  -     XR CHEST PA AND LATERAL; Future; Expected date: 07/30/2025    Other orders  -     benzonatate (TESSALON) 200 MG capsule; Take 1 capsule (200 mg total) by mouth 3 (three) times daily as needed for Cough.  Dispense: 42 capsule; Refill: 0  -     promethazine-dextromethorphan (PROMETHAZINE-DM)  6.25-15 mg/5 mL Syrp; Take 5 mLs by mouth every 6 (six) hours as needed (can take at night).  Dispense: 60 mL; Refill: 0        COVID test was negative.  No pneumonia noted on my interpretation of checks x-ray.  Radiologist read chest x-ray was normal.  Cough medication sent.  Have follow up with pulmonologist tomorrow.

## 2025-07-31 ENCOUNTER — OFFICE VISIT (OUTPATIENT)
Dept: PULMONOLOGY | Facility: CLINIC | Age: 45
End: 2025-07-31
Payer: MEDICARE

## 2025-07-31 ENCOUNTER — TELEPHONE (OUTPATIENT)
Dept: URGENT CARE | Facility: CLINIC | Age: 45
End: 2025-07-31
Payer: MEDICARE

## 2025-07-31 VITALS
BODY MASS INDEX: 28.69 KG/M2 | DIASTOLIC BLOOD PRESSURE: 77 MMHG | OXYGEN SATURATION: 98 % | SYSTOLIC BLOOD PRESSURE: 112 MMHG | WEIGHT: 204.94 LBS | HEART RATE: 88 BPM | HEIGHT: 71 IN

## 2025-07-31 DIAGNOSIS — J18.9 PNEUMONIA OF BOTH LOWER LOBES DUE TO INFECTIOUS ORGANISM: Primary | ICD-10-CM

## 2025-07-31 DIAGNOSIS — R05.2 SUBACUTE COUGH: ICD-10-CM

## 2025-07-31 PROCEDURE — 99999 PR PBB SHADOW E&M-EST. PATIENT-LVL III: CPT | Mod: PBBFAC,HCNC,, | Performed by: NURSE PRACTITIONER

## 2025-07-31 RX ORDER — DOXYCYCLINE 100 MG/1
100 CAPSULE ORAL EVERY 12 HOURS
Qty: 20 CAPSULE | Refills: 0 | Status: SHIPPED | OUTPATIENT
Start: 2025-07-31 | End: 2025-08-10

## 2025-07-31 NOTE — TELEPHONE ENCOUNTER
Telephone encounter from pt and pt's father. Explicit verbal consent obtained due to no POA or proxy info on chart.   AL

## 2025-07-31 NOTE — PATIENT INSTRUCTIONS
Overall, in regards to your recent pneumonia - you were treated with Augmentin and the repeat Chest XRAY was much improved.    With the lingering cough, can pre-cautiously treat with coverage for atypical organisms given the concern for potential aspiration.    Doxycycline - take as prescribed. Will avoid Azithromycin given current caution with Geodon use (can cause heart arrhythmias).     Suspect some possible vocal cord dysfunction/damage post intubation - will likely take time to clear up.    Can repeat Chest XRAY in one month to ensure stability.    I recommend picking up prescribed promethazine cough syrup to help with cough. This will make you drowsy, do not drive or operative heavy machinery after use. Do not take with other sedating medications. Do not mix with alcohol. Utilize fall precautions with use.     Continue current prescription medication regiment. Keep follow up appointment as scheduled. Please call the office if you have any questions or concerns.

## 2025-07-31 NOTE — PROGRESS NOTES
7/31/2025    Ravi Ibanez  New Patient Consult    Chief Complaint   Patient presents with    Cough       HPI:  07/31/2025:  In office today alone.  Denies being seen by prior Pulmonologist. Denies history of lung disease. Denies current use of inhaler therapy, supplemental oxygen, CPAP use. Denies personal history of cancer, PE, current anticoagulation use.  Presented to [facility] on 7/9 with AMS - was found to have GCS 4 and subsequently intubated in the ER for airway protection. Was admitted to the ICU in which he was extubated on 7/11. Review of hospital notes reveals patient developed fever on 7/12 in which blood cultures were obtained and abx were started. Patient noted to have infiltrates to the lungs on CXR at that time, treated with IV Zosyn. Patient was discharged to a behavioral hospital on 7/15 with Rx for Augmentin in which he reports completion. States he remained at the hospital for 8 days total before being discharged home.  Main complaint in office today is lingering cough that has been present since extubation - cough is productive with clear mucous - no time correlation identified. Worse with talking. Cough is associated with nocturnal arousals, difficulty falling asleep. Denies associated wheezing, chest tightness, fever. Has been using OTC Dayquill/Nyquill with somewhat improvement but not complete resolution.   Patient presented to the  on 7/30 and diagnosed with URI - CXR was obtained showing much improvement in comparison to prior. COVID negative. Prescribed Tessalon and Promethazine in which he has not yet picked up.             Social Hx: Lives alone - no animals in the home. Works [employer]. No Asbestosis exposure, Smoking Hx: Never Smoker  Family Hx: No Lung Cancer, COPD, Asthma  Medical Hx: Previous pneumonia ; No previous shoulder/chest surgery        The Chief Complaint is: New to me      PFSH:  Past Medical History:   Diagnosis Date    Hyperlipidemia     Hypertension  "    Hypothyroidism     Schizoaffective disorder          No past surgical history on file.  Social History[1]  No family history on file.  Review of patient's allergies indicates:  No Known Allergies      I have reviewed past medical, family, and social history.     Performance Status:The patient's activity level is no limits with regular activity.        Review of Systems   Constitutional:  Positive for unexpected weight change. Negative for activity change, appetite change, chills, diaphoresis, fatigue and fever.        20%# over the last 6 weeks.     HENT:  Positive for postnasal drip. Negative for congestion, rhinorrhea, sinus pressure, sinus pain, sore throat and trouble swallowing.    Eyes:  Negative for photophobia and visual disturbance.   Respiratory:  Positive for cough. Negative for choking, chest tightness, shortness of breath and wheezing.    Cardiovascular:  Negative for chest pain, palpitations and leg swelling.   Gastrointestinal:  Negative for abdominal distention, abdominal pain, blood in stool, constipation, diarrhea, nausea and vomiting.   Genitourinary:  Negative for difficulty urinating, dysuria, flank pain and hematuria.   Musculoskeletal:  Negative for back pain, gait problem, joint swelling and neck pain.   Skin:  Negative for rash and wound.   Allergic/Immunologic: Negative for environmental allergies and food allergies.   Neurological:  Negative for dizziness, seizures, syncope, weakness, light-headedness, numbness and headaches.   Hematological:  Does not bruise/bleed easily.   Psychiatric/Behavioral:  Positive for sleep disturbance. Negative for confusion. The patient is nervous/anxious.          Exam:Comprehensive exam done. /77 (BP Location: Left arm, Patient Position: Sitting)   Pulse 88   Ht 5' 11" (1.803 m)   Wt 92.9 kg (204 lb 14.7 oz)   SpO2 98% Comment: on room air at rest  BMI 28.58 kg/m²   Exam included Vitals as listed  Constitutional: He is oriented to person, " place, and time. He appears well-developed. No distress.   Nose: Nose normal.   Mouth/Throat: Uvula is midline, oropharynx is clear and moist and mucous membranes are normal. No dental caries. No oropharyngeal exudate, posterior oropharyngeal edema, posterior oropharyngeal erythema or tonsillar abscesses.    Eyes: Pupils are equal, round, and reactive to light.   Neck: No JVD present. No thyromegaly present.   Cardiovascular: Normal rate, regular rhythm and normal heart sounds. Exam reveals no gallop and no friction rub.   No murmur heard.  Pulmonary/Chest: Effort normal and breath sounds normal. No accessory muscle usage or stridor. No apnea and no tachypnea. No respiratory distress, decreased breath sounds, wheezes, rhonchi, rales, or tenderness. Clear breath sounds throughout, on room air, in no acute distress.    Abdominal: Soft. He exhibits no mass. There is no tenderness. No hepatosplenomegaly, hernias and normoactive bowel sounds  Musculoskeletal: Normal range of motion. exhibits no edema.   Neurological:  alert and oriented to person, place, and time. not disoriented.   Skin: Skin is warm and dry. Capillary refill takes less 2 sec. No cyanosis or erythema. No pallor. Nails show no clubbing.   Psychiatric: normal mood and affect. behavior is normal. Judgment and thought content normal.       Radiographs (ct chest and cxr) reviewed: was done by direct view   Patient imaging studies were reviewed and interpreted independently. My personal interpretation of most recent images include:  CXR - 7/30/2025 - No acute process, improved in comparison to prior.       Labs Patient's labs were reviewed including CBC and CMP    Lab Results   Component Value Date    WBC 10.03 07/15/2025    RBC 4.67 07/15/2025    HGB 13.8 (L) 07/15/2025    HCT 40.6 07/15/2025    MCV 87 07/15/2025    MCH 29.6 07/15/2025    MCHC 34.0 07/15/2025    RDW 12.9 07/15/2025     07/15/2025    MPV 9.5 07/15/2025    LYMPH 12.6 (L) 07/15/2025     LYMPH 1.26 07/15/2025    MONO 10.5 07/15/2025    MONO 1.05 (H) 07/15/2025    EOS 1.6 07/15/2025    EOS 0.16 07/15/2025    BASOPHIL 0.5 07/15/2025    BASOPHIL 0.05 07/15/2025   CMP  Sodium   Date Value Ref Range Status   07/15/2025 138 136 - 145 mmol/L Final   05/29/2025 144 135 - 146 mmol/L Final     Potassium   Date Value Ref Range Status   07/15/2025 3.7 3.5 - 5.1 mmol/L Final   05/29/2025 3.7 3.6 - 5.2 mmol/L Final     Chloride   Date Value Ref Range Status   07/15/2025 105 95 - 110 mmol/L Final     CO2   Date Value Ref Range Status   07/15/2025 22 (L) 23 - 29 mmol/L Final     Carbon Dioxide   Date Value Ref Range Status   05/29/2025 32 24 - 32 mmol/L Final     Glucose   Date Value Ref Range Status   07/15/2025 114 (H) 70 - 110 mg/dL Final     BUN   Date Value Ref Range Status   07/15/2025 16 6 - 20 mg/dL Final     Creatinine   Date Value Ref Range Status   07/15/2025 0.9 0.5 - 1.4 mg/dL Final     Calcium   Date Value Ref Range Status   07/15/2025 9.5 8.7 - 10.5 mg/dL Final   05/29/2025 9.5 8.4 - 10.3 mg/dL Final     Protein Total   Date Value Ref Range Status   07/15/2025 7.3 6.0 - 8.4 gm/dL Final     Albumin   Date Value Ref Range Status   07/15/2025 3.1 (L) 3.5 - 5.2 g/dL Final   05/29/2025 4.7 3.4 - 5.0 g/dL Final     Total Bilirubin   Date Value Ref Range Status   05/29/2025 0.8 <1.3 mg/dL Final     Bilirubin Total   Date Value Ref Range Status   07/15/2025 0.8 0.1 - 1.0 mg/dL Final     Comment:     For infants and newborns, interpretation of results should be based   on gestational age, weight and in agreement with clinical   observations.    Premature Infant recommended reference ranges:   0-24 hours:  <8.0 mg/dL   24-48 hours: <12.0 mg/dL   3-5 days:    <15.0 mg/dL   6-29 days:   <15.0 mg/dL     ALP   Date Value Ref Range Status   07/15/2025 191 (H) 40 - 150 unit/L Final     AST   Date Value Ref Range Status   07/15/2025 114 (H) 11 - 45 unit/L Final   05/29/2025 17 <45 U/L Final     ALT   Date Value Ref  "Range Status   07/15/2025 131 (H) 10 - 44 unit/L Final   05/29/2025 17 <46 U/L Final     Anion Gap   Date Value Ref Range Status   07/15/2025 11 8 - 16 mmol/L Final     eGFR   Date Value Ref Range Status   07/15/2025 >60 >60 mL/min/1.73/m2 Final     Comment:     Estimated GFR calculated using the CKD-EPI creatinine (2021) equation.           PFT was not done  Pulmonary Functions Testing Results:        Plan:  Clinical impression is apparently straight forward and impression with management as below.    Ravi Cole" was seen today for cough.    Diagnoses and all orders for this visit:    Pneumonia of both lower lobes due to infectious organism  -     X-Ray Chest PA And Lateral; Future  -     doxycycline (VIBRAMYCIN) 100 MG Cap; Take 1 capsule (100 mg total) by mouth every 12 (twelve) hours. for 10 days    Subacute cough        Follow up in about 3 months (around 10/31/2025), or if symptoms worsen or fail to improve.    Discussed with patient above for education the following:      Patient Instructions   Overall, in regards to your recent pneumonia - you were treated with Augmentin and the repeat Chest XRAY was much improved.    With the lingering cough, can pre-cautiously treat with coverage for atypical organisms given the concern for potential aspiration.    Doxycycline - take as prescribed. Will avoid Azithromycin given current caution with Geodon use (can cause heart arrhythmias).     Suspect some possible vocal cord dysfunction/damage post intubation - will likely take time to clear up.    Can repeat Chest XRAY in one month to ensure stability.    I recommend picking up prescribed promethazine cough syrup to help with cough. This will make you drowsy, do not drive or operative heavy machinery after use. Do not take with other sedating medications. Do not mix with alcohol. Utilize fall precautions with use.     Continue current prescription medication regiment. Keep follow up appointment as scheduled. " Please call the office if you have any questions or concerns.                 [1]   Social History  Tobacco Use    Smoking status: Former     Types: Cigarettes, Vaping w/o nicotine, Vaping with nicotine    Smokeless tobacco: Current    Tobacco comments:     Patient states I vape occasionally    Substance Use Topics    Alcohol use: Not Currently    Drug use: Yes     Types: Marijuana     Comment: Patient states I smoke every night and it is prescribed for anxiety

## 2025-08-18 DIAGNOSIS — I10 HYPERTENSION, UNSPECIFIED TYPE: Chronic | ICD-10-CM

## 2025-08-18 RX ORDER — LISINOPRIL 20 MG/1
20 TABLET ORAL DAILY
Qty: 30 TABLET | Refills: 0 | Status: SHIPPED | OUTPATIENT
Start: 2025-08-18 | End: 2025-09-17

## 2025-08-25 ENCOUNTER — TELEPHONE (OUTPATIENT)
Dept: INTERNAL MEDICINE | Facility: CLINIC | Age: 45
End: 2025-08-25
Payer: MEDICARE

## 2025-08-25 DIAGNOSIS — F25.0 SCHIZOAFFECTIVE DISORDER, BIPOLAR TYPE: Chronic | ICD-10-CM

## 2025-08-25 DIAGNOSIS — I10 HYPERTENSION, UNSPECIFIED TYPE: Chronic | ICD-10-CM

## 2025-08-25 DIAGNOSIS — E03.9 HYPOTHYROIDISM, UNSPECIFIED TYPE: Chronic | ICD-10-CM

## 2025-08-25 DIAGNOSIS — F41.1 GAD (GENERALIZED ANXIETY DISORDER): ICD-10-CM

## 2025-08-25 RX ORDER — ZIPRASIDONE HYDROCHLORIDE 60 MG/1
60 CAPSULE ORAL
Qty: 30 CAPSULE | Refills: 3 | Status: SHIPPED | OUTPATIENT
Start: 2025-08-25

## 2025-08-25 RX ORDER — LAMOTRIGINE 200 MG/1
200 TABLET ORAL DAILY
Qty: 30 TABLET | Refills: 0 | Status: SHIPPED | OUTPATIENT
Start: 2025-08-25 | End: 2025-09-24

## 2025-08-25 RX ORDER — LEVOTHYROXINE SODIUM 75 UG/1
75 TABLET ORAL
Qty: 30 TABLET | Refills: 3 | Status: SHIPPED | OUTPATIENT
Start: 2025-08-25 | End: 2025-08-26 | Stop reason: SDUPTHER

## 2025-08-25 RX ORDER — LISINOPRIL 20 MG/1
20 TABLET ORAL DAILY
Qty: 30 TABLET | Refills: 3 | Status: SHIPPED | OUTPATIENT
Start: 2025-08-25 | End: 2025-08-26 | Stop reason: SDUPTHER

## 2025-08-25 RX ORDER — CLONAZEPAM 2 MG/1
2 TABLET ORAL NIGHTLY
Qty: 30 TABLET | Refills: 2 | Status: SHIPPED | OUTPATIENT
Start: 2025-08-25

## 2025-08-26 DIAGNOSIS — E03.9 HYPOTHYROIDISM, UNSPECIFIED TYPE: Chronic | ICD-10-CM

## 2025-08-26 DIAGNOSIS — I10 HYPERTENSION, UNSPECIFIED TYPE: Chronic | ICD-10-CM

## 2025-08-26 RX ORDER — LEVOTHYROXINE SODIUM 75 UG/1
75 TABLET ORAL
Qty: 90 TABLET | Refills: 1 | Status: SHIPPED | OUTPATIENT
Start: 2025-08-26

## 2025-08-26 RX ORDER — LISINOPRIL 20 MG/1
20 TABLET ORAL DAILY
Qty: 90 TABLET | Refills: 1 | Status: SHIPPED | OUTPATIENT
Start: 2025-08-26

## 2025-08-28 ENCOUNTER — OFFICE VISIT (OUTPATIENT)
Dept: INTERNAL MEDICINE | Facility: CLINIC | Age: 45
End: 2025-08-28
Payer: MEDICARE

## 2025-08-28 VITALS
HEIGHT: 71 IN | BODY MASS INDEX: 29.16 KG/M2 | TEMPERATURE: 97 F | OXYGEN SATURATION: 96 % | DIASTOLIC BLOOD PRESSURE: 60 MMHG | RESPIRATION RATE: 18 BRPM | HEART RATE: 106 BPM | SYSTOLIC BLOOD PRESSURE: 132 MMHG | WEIGHT: 208.31 LBS

## 2025-08-28 DIAGNOSIS — F25.9 SCHIZOAFFECTIVE DISORDER, UNSPECIFIED TYPE: ICD-10-CM

## 2025-08-28 DIAGNOSIS — Z91.89 AT RISK FOR READMISSION TO HOSPITAL: ICD-10-CM

## 2025-08-28 DIAGNOSIS — Z00.00 ANNUAL PHYSICAL EXAM: Primary | ICD-10-CM

## 2025-08-28 DIAGNOSIS — Z76.89 ENCOUNTER TO ESTABLISH CARE: ICD-10-CM

## 2025-08-28 DIAGNOSIS — I10 HYPERTENSION, UNSPECIFIED TYPE: ICD-10-CM

## 2025-08-28 DIAGNOSIS — E03.9 HYPOTHYROIDISM, UNSPECIFIED TYPE: ICD-10-CM

## 2025-08-28 DIAGNOSIS — M25.511 ACUTE PAIN OF RIGHT SHOULDER: ICD-10-CM

## 2025-08-28 DIAGNOSIS — E78.5 HYPERLIPIDEMIA, UNSPECIFIED HYPERLIPIDEMIA TYPE: ICD-10-CM

## 2025-08-28 PROCEDURE — 3008F BODY MASS INDEX DOCD: CPT | Mod: CPTII,HCNC,S$GLB,

## 2025-08-28 PROCEDURE — 1160F RVW MEDS BY RX/DR IN RCRD: CPT | Mod: CPTII,HCNC,S$GLB,

## 2025-08-28 PROCEDURE — 1159F MED LIST DOCD IN RCRD: CPT | Mod: CPTII,HCNC,S$GLB,

## 2025-08-28 PROCEDURE — 3044F HG A1C LEVEL LT 7.0%: CPT | Mod: CPTII,HCNC,S$GLB,

## 2025-08-28 PROCEDURE — 99396 PREV VISIT EST AGE 40-64: CPT | Mod: HCNC,S$GLB,,

## 2025-08-28 PROCEDURE — 3078F DIAST BP <80 MM HG: CPT | Mod: CPTII,HCNC,S$GLB,

## 2025-08-28 PROCEDURE — 4010F ACE/ARB THERAPY RXD/TAKEN: CPT | Mod: CPTII,HCNC,S$GLB,

## 2025-08-28 PROCEDURE — 99999 PR PBB SHADOW E&M-EST. PATIENT-LVL V: CPT | Mod: PBBFAC,HCNC,,

## 2025-08-28 PROCEDURE — 3075F SYST BP GE 130 - 139MM HG: CPT | Mod: CPTII,HCNC,S$GLB,

## 2025-09-04 ENCOUNTER — OUTPATIENT CASE MANAGEMENT (OUTPATIENT)
Dept: ADMINISTRATIVE | Facility: OTHER | Age: 45
End: 2025-09-04
Payer: MEDICARE